# Patient Record
Sex: FEMALE | Race: WHITE | NOT HISPANIC OR LATINO | Employment: OTHER | URBAN - METROPOLITAN AREA
[De-identification: names, ages, dates, MRNs, and addresses within clinical notes are randomized per-mention and may not be internally consistent; named-entity substitution may affect disease eponyms.]

---

## 2022-02-14 NOTE — PROGRESS NOTES
Subjective:      Patient ID: Dalia Peña is a 76 y.o. female.    Chief Complaint: Establish Care      HPI  Patient is here to establish care, and for preventive exam.  Active daily, and [employer] working out.  Energy ok.  Had covid last month.  Occas allergies, claritin helps.  No f/c/sw/cough.  No cp/sob/palp.  BMs little slow, ok with stool softener.  Urine normal.  No depression/anxiety.  Sleeps ok.  Sometimes legs swelling at night.  Sometimes tingling toes.  Not eating much red meat.  Occas heartburn, takes prilosec prn.    Past Medical History:   Diagnosis Date    Essential (primary) hypertension      Past Surgical History:   Procedure Laterality Date    none       MEDS:    amLODIPine (NORVASC) 5 MG tablet 5 mg, Daily       NKDA    SH:  No tob, no EtOH, , Cares for 10y old grandson.    FH:  Mom leukemia 47yo.    HM:  10/21 fluvax, 5/21 covid vaccines, has had pneumovaccines, due for mmg, BMD at [facility], Cscope at 71yo rep 10y.    Review of Systems   Constitutional: Negative for activity change, appetite change, chills, diaphoresis, fever and unexpected weight change.   HENT: Positive for hearing loss. Negative for congestion, ear pain, rhinorrhea, sinus pressure, sore throat and trouble swallowing.    Eyes: Negative for discharge and visual disturbance.   Respiratory: Negative for cough, chest tightness, shortness of breath and wheezing.    Cardiovascular: Negative for chest pain and palpitations.   Gastrointestinal: Negative for blood in stool, constipation, diarrhea, nausea and vomiting.   Endocrine: Negative for polydipsia and polyuria.   Genitourinary: Negative for difficulty urinating, dysuria, frequency, hematuria, menstrual problem, urgency and vaginal discharge.   Musculoskeletal: Negative for arthralgias, joint swelling and neck pain.   Skin: Negative for rash.   Neurological: Negative for dizziness, weakness and headaches.   Psychiatric/Behavioral: Negative for confusion,  "dysphoric mood and sleep disturbance. The patient is not nervous/anxious.          Objective:   /72   Pulse 83   Temp 97.7 °F (36.5 °C) (Temporal)   Resp 18   Ht 5' 6" (1.676 m)   Wt 77 kg (169 lb 12.1 oz)   SpO2 99%   BMI 27.40 kg/m²     Physical Exam  Constitutional:       Appearance: She is well-developed.   HENT:      Right Ear: External ear normal. Tympanic membrane is not injected.      Left Ear: External ear normal. Tympanic membrane is not injected.   Eyes:      Conjunctiva/sclera: Conjunctivae normal.   Neck:      Thyroid: No thyromegaly.   Cardiovascular:      Rate and Rhythm: Normal rate and regular rhythm.      Heart sounds: No murmur heard.    No friction rub. No gallop.   Pulmonary:      Effort: Pulmonary effort is normal.      Breath sounds: Normal breath sounds. No wheezing or rales.   Chest:   Breasts:      Right: No mass, nipple discharge, skin change or tenderness.      Left: No mass, nipple discharge, skin change or tenderness.       Abdominal:      General: Bowel sounds are normal.      Palpations: Abdomen is soft. There is no mass.      Tenderness: There is no abdominal tenderness.   Musculoskeletal:      Cervical back: Normal range of motion and neck supple.   Lymphadenopathy:      Cervical: No cervical adenopathy.   Skin:     General: Skin is warm.      Findings: No rash.   Neurological:      Mental Status: She is alert and oriented to person, place, and time.             Assessment:       1. Essential (primary) hypertension    2. Encounter for preventive health examination    3. Encounter for screening mammogram for malignant neoplasm of breast    4. Paresthesia    5. Leg swelling          Plan:     Essential (primary) hypertension- Lab now with [place].  CXR and EKG.  -     CBC Auto Differential; Future; Expected date: 02/24/2022  -     Comprehensive Metabolic Panel; Future; Expected date: 02/24/2022  -     Lipid Panel; Future; Expected date: 02/24/2022  -     TSH; Future; Expected " date: 02/24/2022    Encounter for preventive health examination- Discussed pt needs to get Shingrix vaccination at pharmacy.    Encounter for screening mammogram for malignant neoplasm of breast  -     Mammo Digital Screening Bilat w/ Andrew; Future; Expected date: 02/24/2022    RTC 6mo.  Check BNP and B12.

## 2022-02-24 ENCOUNTER — HOSPITAL ENCOUNTER (OUTPATIENT)
Dept: RADIOLOGY | Facility: HOSPITAL | Age: 77
Discharge: HOME | End: 2022-02-24
Attending: INTERNAL MEDICINE
Payer: MEDICARE

## 2022-02-24 ENCOUNTER — OFFICE VISIT (OUTPATIENT)
Dept: FAMILY MEDICINE | Facility: CLINIC | Age: 77
End: 2022-02-24
Payer: MEDICARE

## 2022-02-24 VITALS
TEMPERATURE: 98 F | SYSTOLIC BLOOD PRESSURE: 134 MMHG | HEART RATE: 83 BPM | BODY MASS INDEX: 27.28 KG/M2 | RESPIRATION RATE: 18 BRPM | WEIGHT: 169.75 LBS | OXYGEN SATURATION: 99 % | HEIGHT: 66 IN | DIASTOLIC BLOOD PRESSURE: 72 MMHG

## 2022-02-24 DIAGNOSIS — Z00.00 ENCOUNTER FOR PREVENTIVE HEALTH EXAMINATION: ICD-10-CM

## 2022-02-24 DIAGNOSIS — I10 ESSENTIAL (PRIMARY) HYPERTENSION: ICD-10-CM

## 2022-02-24 DIAGNOSIS — Z12.31 ENCOUNTER FOR SCREENING MAMMOGRAM FOR MALIGNANT NEOPLASM OF BREAST: ICD-10-CM

## 2022-02-24 DIAGNOSIS — R20.2 PARESTHESIA: ICD-10-CM

## 2022-02-24 DIAGNOSIS — I10 ESSENTIAL (PRIMARY) HYPERTENSION: Primary | ICD-10-CM

## 2022-02-24 DIAGNOSIS — M79.89 LEG SWELLING: ICD-10-CM

## 2022-02-24 PROCEDURE — 99203 OFFICE O/P NEW LOW 30 MIN: CPT | Mod: S$GLB,,, | Performed by: INTERNAL MEDICINE

## 2022-02-24 PROCEDURE — 1126F AMNT PAIN NOTED NONE PRSNT: CPT | Mod: CPTII,S$GLB,, | Performed by: INTERNAL MEDICINE

## 2022-02-24 PROCEDURE — 71046 X-RAY EXAM CHEST 2 VIEWS: CPT | Mod: 26,,, | Performed by: RADIOLOGY

## 2022-02-24 PROCEDURE — 3288F FALL RISK ASSESSMENT DOCD: CPT | Mod: CPTII,S$GLB,, | Performed by: INTERNAL MEDICINE

## 2022-02-24 PROCEDURE — 1101F PR PT FALLS ASSESS DOC 0-1 FALLS W/OUT INJ PAST YR: ICD-10-PCS | Mod: CPTII,S$GLB,, | Performed by: INTERNAL MEDICINE

## 2022-02-24 PROCEDURE — 3078F DIAST BP <80 MM HG: CPT | Mod: CPTII,S$GLB,, | Performed by: INTERNAL MEDICINE

## 2022-02-24 PROCEDURE — 99999 PR PBB SHADOW E&M-NEW PATIENT-LVL III: CPT | Mod: PBBFAC,,, | Performed by: INTERNAL MEDICINE

## 2022-02-24 PROCEDURE — 3288F PR FALLS RISK ASSESSMENT DOCUMENTED: ICD-10-PCS | Mod: CPTII,S$GLB,, | Performed by: INTERNAL MEDICINE

## 2022-02-24 PROCEDURE — 3075F PR MOST RECENT SYSTOLIC BLOOD PRESS GE 130-139MM HG: ICD-10-PCS | Mod: CPTII,S$GLB,, | Performed by: INTERNAL MEDICINE

## 2022-02-24 PROCEDURE — 1126F PR PAIN SEVERITY QUANTIFIED, NO PAIN PRESENT: ICD-10-PCS | Mod: CPTII,S$GLB,, | Performed by: INTERNAL MEDICINE

## 2022-02-24 PROCEDURE — 99203 PR OFFICE/OUTPT VISIT, NEW, LEVL III, 30-44 MIN: ICD-10-PCS | Mod: S$GLB,,, | Performed by: INTERNAL MEDICINE

## 2022-02-24 PROCEDURE — 71046 X-RAY EXAM CHEST 2 VIEWS: CPT | Mod: TC,FY,PO

## 2022-02-24 PROCEDURE — 71046 XR CHEST PA AND LATERAL: ICD-10-PCS | Mod: 26,,, | Performed by: RADIOLOGY

## 2022-02-24 PROCEDURE — 3075F SYST BP GE 130 - 139MM HG: CPT | Mod: CPTII,S$GLB,, | Performed by: INTERNAL MEDICINE

## 2022-02-24 PROCEDURE — 1101F PT FALLS ASSESS-DOCD LE1/YR: CPT | Mod: CPTII,S$GLB,, | Performed by: INTERNAL MEDICINE

## 2022-02-24 PROCEDURE — 3078F PR MOST RECENT DIASTOLIC BLOOD PRESSURE < 80 MM HG: ICD-10-PCS | Mod: CPTII,S$GLB,, | Performed by: INTERNAL MEDICINE

## 2022-02-24 PROCEDURE — 99999 PR PBB SHADOW E&M-NEW PATIENT-LVL III: ICD-10-PCS | Mod: PBBFAC,,, | Performed by: INTERNAL MEDICINE

## 2022-02-24 RX ORDER — AMLODIPINE BESYLATE 5 MG/1
5 TABLET ORAL DAILY
COMMUNITY
End: 2022-03-14 | Stop reason: SDUPTHER

## 2022-03-01 ENCOUNTER — PATIENT MESSAGE (OUTPATIENT)
Dept: HOME HEALTH SERVICES | Facility: HOSPITAL | Age: 77
End: 2022-03-01
Payer: MEDICARE

## 2022-03-01 ENCOUNTER — PATIENT MESSAGE (OUTPATIENT)
Dept: FAMILY MEDICINE | Facility: CLINIC | Age: 77
End: 2022-03-01
Payer: MEDICARE

## 2022-03-11 ENCOUNTER — PATIENT MESSAGE (OUTPATIENT)
Dept: FAMILY MEDICINE | Facility: CLINIC | Age: 77
End: 2022-03-11
Payer: MEDICARE

## 2022-03-14 ENCOUNTER — PATIENT MESSAGE (OUTPATIENT)
Dept: FAMILY MEDICINE | Facility: CLINIC | Age: 77
End: 2022-03-14
Payer: MEDICARE

## 2022-03-14 RX ORDER — AMLODIPINE BESYLATE 5 MG/1
5 TABLET ORAL DAILY
Qty: 90 TABLET | Refills: 3 | Status: SHIPPED | OUTPATIENT
Start: 2022-03-14 | End: 2023-03-30

## 2022-03-22 ENCOUNTER — HOSPITAL ENCOUNTER (OUTPATIENT)
Dept: RADIOLOGY | Facility: HOSPITAL | Age: 77
Discharge: HOME | End: 2022-03-22
Attending: INTERNAL MEDICINE
Payer: MEDICARE

## 2022-03-22 VITALS — BODY MASS INDEX: 27.16 KG/M2 | HEIGHT: 66 IN | WEIGHT: 169 LBS

## 2022-03-22 DIAGNOSIS — Z12.31 ENCOUNTER FOR SCREENING MAMMOGRAM FOR MALIGNANT NEOPLASM OF BREAST: ICD-10-CM

## 2022-03-22 PROCEDURE — 77063 MAMMO DIGITAL SCREENING BILAT WITH TOMO: ICD-10-PCS | Mod: 26,,, | Performed by: RADIOLOGY

## 2022-03-22 PROCEDURE — 77067 MAMMO DIGITAL SCREENING BILAT WITH TOMO: ICD-10-PCS | Mod: 26,,, | Performed by: RADIOLOGY

## 2022-03-22 PROCEDURE — 77067 SCR MAMMO BI INCL CAD: CPT | Mod: 26,,, | Performed by: RADIOLOGY

## 2022-03-22 PROCEDURE — 77063 BREAST TOMOSYNTHESIS BI: CPT | Mod: TC

## 2022-03-22 PROCEDURE — 77063 BREAST TOMOSYNTHESIS BI: CPT | Mod: 26,,, | Performed by: RADIOLOGY

## 2022-04-04 ENCOUNTER — OFFICE VISIT (OUTPATIENT)
Dept: DERMATOLOGY | Facility: CLINIC | Age: 77
End: 2022-04-04
Payer: MEDICARE

## 2022-04-04 DIAGNOSIS — Z12.83 SKIN CANCER SCREENING: Primary | ICD-10-CM

## 2022-04-04 DIAGNOSIS — L81.4 SOLAR LENTIGO: ICD-10-CM

## 2022-04-04 DIAGNOSIS — L82.1 SEBORRHEIC KERATOSES: ICD-10-CM

## 2022-04-04 DIAGNOSIS — D18.00 HEMANGIOMA, UNSPECIFIED SITE: ICD-10-CM

## 2022-04-04 DIAGNOSIS — Z85.828 HISTORY OF NONMELANOMA SKIN CANCER: ICD-10-CM

## 2022-04-04 DIAGNOSIS — D22.9 MULTIPLE BENIGN NEVI: ICD-10-CM

## 2022-04-04 PROCEDURE — 1126F AMNT PAIN NOTED NONE PRSNT: CPT | Mod: CPTII,S$GLB,, | Performed by: STUDENT IN AN ORGANIZED HEALTH CARE EDUCATION/TRAINING PROGRAM

## 2022-04-04 PROCEDURE — 1126F PR PAIN SEVERITY QUANTIFIED, NO PAIN PRESENT: ICD-10-PCS | Mod: CPTII,S$GLB,, | Performed by: STUDENT IN AN ORGANIZED HEALTH CARE EDUCATION/TRAINING PROGRAM

## 2022-04-04 PROCEDURE — 1159F PR MEDICATION LIST DOCUMENTED IN MEDICAL RECORD: ICD-10-PCS | Mod: CPTII,S$GLB,, | Performed by: STUDENT IN AN ORGANIZED HEALTH CARE EDUCATION/TRAINING PROGRAM

## 2022-04-04 PROCEDURE — 3288F PR FALLS RISK ASSESSMENT DOCUMENTED: ICD-10-PCS | Mod: CPTII,S$GLB,, | Performed by: STUDENT IN AN ORGANIZED HEALTH CARE EDUCATION/TRAINING PROGRAM

## 2022-04-04 PROCEDURE — 1160F PR REVIEW ALL MEDS BY PRESCRIBER/CLIN PHARMACIST DOCUMENTED: ICD-10-PCS | Mod: CPTII,S$GLB,, | Performed by: STUDENT IN AN ORGANIZED HEALTH CARE EDUCATION/TRAINING PROGRAM

## 2022-04-04 PROCEDURE — 99203 OFFICE O/P NEW LOW 30 MIN: CPT | Mod: S$GLB,,, | Performed by: STUDENT IN AN ORGANIZED HEALTH CARE EDUCATION/TRAINING PROGRAM

## 2022-04-04 PROCEDURE — 99203 PR OFFICE/OUTPT VISIT, NEW, LEVL III, 30-44 MIN: ICD-10-PCS | Mod: S$GLB,,, | Performed by: STUDENT IN AN ORGANIZED HEALTH CARE EDUCATION/TRAINING PROGRAM

## 2022-04-04 PROCEDURE — 1160F RVW MEDS BY RX/DR IN RCRD: CPT | Mod: CPTII,S$GLB,, | Performed by: STUDENT IN AN ORGANIZED HEALTH CARE EDUCATION/TRAINING PROGRAM

## 2022-04-04 PROCEDURE — 99999 PR PBB SHADOW E&M-EST. PATIENT-LVL III: ICD-10-PCS | Mod: PBBFAC,,, | Performed by: STUDENT IN AN ORGANIZED HEALTH CARE EDUCATION/TRAINING PROGRAM

## 2022-04-04 PROCEDURE — 3288F FALL RISK ASSESSMENT DOCD: CPT | Mod: CPTII,S$GLB,, | Performed by: STUDENT IN AN ORGANIZED HEALTH CARE EDUCATION/TRAINING PROGRAM

## 2022-04-04 PROCEDURE — 99999 PR PBB SHADOW E&M-EST. PATIENT-LVL III: CPT | Mod: PBBFAC,,, | Performed by: STUDENT IN AN ORGANIZED HEALTH CARE EDUCATION/TRAINING PROGRAM

## 2022-04-04 PROCEDURE — 1159F MED LIST DOCD IN RCRD: CPT | Mod: CPTII,S$GLB,, | Performed by: STUDENT IN AN ORGANIZED HEALTH CARE EDUCATION/TRAINING PROGRAM

## 2022-04-04 PROCEDURE — 1101F PT FALLS ASSESS-DOCD LE1/YR: CPT | Mod: CPTII,S$GLB,, | Performed by: STUDENT IN AN ORGANIZED HEALTH CARE EDUCATION/TRAINING PROGRAM

## 2022-04-04 PROCEDURE — 1101F PR PT FALLS ASSESS DOC 0-1 FALLS W/OUT INJ PAST YR: ICD-10-PCS | Mod: CPTII,S$GLB,, | Performed by: STUDENT IN AN ORGANIZED HEALTH CARE EDUCATION/TRAINING PROGRAM

## 2022-04-04 NOTE — PROGRESS NOTES
Patient Information  Name: Dalia Peña  : 1945  MRN: [id number]     Referring Physician:  Dr. Meza   Primary Care Physician:  Dr. Giselle Warren MD   Date of Visit: 2022      Subjective:       Dalia Peña is a 76 y.o. female who presents for   Chief Complaint   Patient presents with    Skin Check     HPI  Patient here for skin check.     Does patient have a personal hx of skin cancers? Yes, BCC on back  Does patient have family hx of melanoma?  Yes, younger sister  Does patient have hx of strong sun exposure or tanning bed use in the past? Yes to sun exposure    Patient was last seen:Visit date not found     Prior notes by myself reviewed.   Clinical documentation obtained by nursing staff reviewed.    Review of Systems   Skin: Negative for itching and rash.        Objective:    Physical Exam   Constitutional: She appears well-developed and well-nourished. No distress.   Neurological: She is alert and oriented to person, place, and time. She is not disoriented.   Psychiatric: She has a normal mood and affect.   Skin:   Areas Examined (abnormalities noted in diagram):   Scalp / Hair Palpated and Inspected  Head / Face Inspection Performed  Neck Inspection Performed  Chest / Axilla Inspection Performed  Abdomen Inspection Performed  Genitals / Buttocks / Groin Inspection Performed  Back Inspection Performed  RUE Inspected  LUE Inspection Performed  RLE Inspected  LLE Inspection Performed  Nails and Digits Inspection Performed                   Diagram Legend     Erythematous scaling macule/papule c/w actinic keratosis       Vascular papule c/w angioma      Pigmented verrucoid papule/plaque c/w seborrheic keratosis      Yellow umbilicated papule c/w sebaceous hyperplasia      Irregularly shaped tan macule c/w lentigo     1-2 mm smooth white papules consistent with Milia      Movable subcutaneous cyst with punctum c/w epidermal inclusion cyst      Subcutaneous movable cyst c/w pilar cyst       Firm pink to brown papule c/w dermatofibroma      Pedunculated fleshy papule(s) c/w skin tag(s)      Evenly pigmented macule c/w junctional nevus     Mildly variegated pigmented, slightly irregular-bordered macule c/w mildly atypical nevus      Flesh colored to evenly pigmented papule c/w intradermal nevus       Pink pearly papule/plaque c/w basal cell carcinoma      Erythematous hyperkeratotic cursted plaque c/w SCC      Surgical scar with no sign of skin cancer recurrence      Open and closed comedones      Inflammatory papules and pustules      Verrucoid papule consistent consistent with wart     Erythematous eczematous patches and plaques     Dystrophic onycholytic nail with subungual debris c/w onychomycosis     Umbilicated papule    Erythematous-base heme-crusted tan verrucoid plaque consistent with inflamed seborrheic keratosis     Erythematous Silvery Scaling Plaque c/w Psoriasis     See annotation      No images are attached to the encounter or orders placed in the encounter.    [] Data reviewed  [] Independent review of test  [] Management discussed with another provider    Assessment / Plan:        Skin cancer screening/History of nonmelanoma skin cancer  Area of previous nonmelanoma examined. Site well healed with no signs of recurrence.    Total body skin examination performed today including at least 12 points as noted in physical examination. No lesions suspicious for malignancy noted.    Recommend daily sun protection/avoidance, use of at least SPF 30, broad spectrum sunscreen (OTC drug), skin self examinations, and routine physician surveillance to optimize early detection      Seborrheic keratoses  These are benign inherited growths without a malignant potential. Reassurance given to patient. No treatment is necessary.     Hemangioma, unspecified site  This is a benign vascular lesion. Reassurance given. No treatment required.     Solar lentigo  This is a benign hyperpigmented sun induced  lesion. Recommend daily sun protection/avoidance and use of at least SPF 30, broad spectrum sunscreen (OTC drug) will reduce the number of new lesions. Treatment of these benign lesions are considered cosmetic.    Multiple benign nevi  Discussed ABCDE's of nevi.  Monitor for new mole or moles that are becoming bigger, darker, irritated, or developing irregular borders. Brochure provided. Instructed patient to observe lesion(s) for changes and follow up in clinic if changes are noted. Patient to monitor skin at home for new or changing lesions.                  LOS NUMBER AND COMPLEXITY OF PROBLEMS    COMPLEXITY OF DATA RISK TOTAL TIME (m)   27415  63196 [] 1 self-limited or minor problem [x] Minimal to none [] No treatment recommended or patient to monitor 15-29  10-19   63996  35200 Low  [] 2 or > self limited or minor problems  [] 1 stable chronic illness  [] 1 acute, uncomplicated illness or injury Limited (2)  [] Prior external notes from each unique source  [] Review result of each unique test  [] Order each unique test [x]  Low  OTC medications, minor skin biopsy 30-44  20-29   78560  12087 Moderate  []  1 or > chronic illness with progression, exacerbation or SE of treatment  [x]  2 or more stable chronic illnesses  []  1 acute illness with systemic symptoms  []  1 acute complicated injury  []  1 undiagnosed new problem with uncertain prognosis Moderate (1/3 below)  []  3 or more data items        *Now includes assessment requiring independent historian  []  Independent interpretation of a test  []  Discuss management/test with another provider Moderate  []  Prescription drug mgmt  []  Minor surgery with risk discussed  []  Mgmt limited by social determinates 45-59  30-39   90978  87784 High  []  1 or more chronic illness with severe exacerbation, progression or SE of treatment  []  1 acute or chronic illness/injury that poses a threat to life or bodily function Extensive (2/3 below)  []  3 or more data  items        *Now includes assessment requiring independent historian.  []  Independent interpretation of a test  []  Discuss management/test with another provider High  []  Major surgery with risk discussed  []  Drug therapy requiring intensive monitoring for toxicity  []  Hospitalization  []  Decision for DNR 60-74  40-54      Follow up in about 1 year (around 4/4/2023).    Radha Dukes MD, FAAD  [facility]

## 2022-04-06 ENCOUNTER — PATIENT MESSAGE (OUTPATIENT)
Dept: FAMILY MEDICINE | Facility: CLINIC | Age: 77
End: 2022-04-06
Payer: MEDICARE

## 2022-05-10 ENCOUNTER — TELEPHONE (OUTPATIENT)
Dept: FAMILY MEDICINE | Facility: CLINIC | Age: 77
End: 2022-05-10
Payer: MEDICARE

## 2022-05-10 NOTE — TELEPHONE ENCOUNTER
S/w pt offered pt to go to UC she denied said it was to far and she did not want to go to any other UC. I offered appt on Monday she denied told pt we could look at other locations and she said not really because she has not seen them and knows nothing about them. Told pt if she could not go to urgent care or changed her mind I could get her in next available or always at a different location if available pt stated she would call back and thank you.

## 2022-05-10 NOTE — TELEPHONE ENCOUNTER
----- Message from Sylvie Bose sent at 5/10/2022  4:49 PM CDT -----  Contact: self  Dalia Peña would like a call back at [phone], in regards to getting an appointment scheduled on tomorrow for sinus and a cold. Pt states that she is comfortable with seeing a nurse.

## 2022-05-13 ENCOUNTER — OFFICE VISIT (OUTPATIENT)
Dept: URGENT CARE | Facility: CLINIC | Age: 77
End: 2022-05-13
Payer: MEDICARE

## 2022-05-13 VITALS
SYSTOLIC BLOOD PRESSURE: 139 MMHG | HEART RATE: 65 BPM | WEIGHT: 170 LBS | OXYGEN SATURATION: 97 % | RESPIRATION RATE: 18 BRPM | DIASTOLIC BLOOD PRESSURE: 69 MMHG | TEMPERATURE: 97 F | HEIGHT: 66 IN | BODY MASS INDEX: 27.32 KG/M2

## 2022-05-13 DIAGNOSIS — J30.2 SEASONAL ALLERGIC RHINITIS, UNSPECIFIED TRIGGER: Primary | ICD-10-CM

## 2022-05-13 PROCEDURE — 99203 PR OFFICE/OUTPT VISIT, NEW, LEVL III, 30-44 MIN: ICD-10-PCS | Mod: S$GLB,,, | Performed by: NURSE PRACTITIONER

## 2022-05-13 PROCEDURE — 1160F PR REVIEW ALL MEDS BY PRESCRIBER/CLIN PHARMACIST DOCUMENTED: ICD-10-PCS | Mod: CPTII,S$GLB,, | Performed by: NURSE PRACTITIONER

## 2022-05-13 PROCEDURE — 1159F MED LIST DOCD IN RCRD: CPT | Mod: CPTII,S$GLB,, | Performed by: NURSE PRACTITIONER

## 2022-05-13 PROCEDURE — 1160F RVW MEDS BY RX/DR IN RCRD: CPT | Mod: CPTII,S$GLB,, | Performed by: NURSE PRACTITIONER

## 2022-05-13 PROCEDURE — 1159F PR MEDICATION LIST DOCUMENTED IN MEDICAL RECORD: ICD-10-PCS | Mod: CPTII,S$GLB,, | Performed by: NURSE PRACTITIONER

## 2022-05-13 PROCEDURE — 3078F DIAST BP <80 MM HG: CPT | Mod: CPTII,S$GLB,, | Performed by: NURSE PRACTITIONER

## 2022-05-13 PROCEDURE — 99203 OFFICE O/P NEW LOW 30 MIN: CPT | Mod: S$GLB,,, | Performed by: NURSE PRACTITIONER

## 2022-05-13 PROCEDURE — 3075F PR MOST RECENT SYSTOLIC BLOOD PRESS GE 130-139MM HG: ICD-10-PCS | Mod: CPTII,S$GLB,, | Performed by: NURSE PRACTITIONER

## 2022-05-13 PROCEDURE — 1126F PR PAIN SEVERITY QUANTIFIED, NO PAIN PRESENT: ICD-10-PCS | Mod: CPTII,S$GLB,, | Performed by: NURSE PRACTITIONER

## 2022-05-13 PROCEDURE — 1126F AMNT PAIN NOTED NONE PRSNT: CPT | Mod: CPTII,S$GLB,, | Performed by: NURSE PRACTITIONER

## 2022-05-13 PROCEDURE — 3078F PR MOST RECENT DIASTOLIC BLOOD PRESSURE < 80 MM HG: ICD-10-PCS | Mod: CPTII,S$GLB,, | Performed by: NURSE PRACTITIONER

## 2022-05-13 PROCEDURE — 3075F SYST BP GE 130 - 139MM HG: CPT | Mod: CPTII,S$GLB,, | Performed by: NURSE PRACTITIONER

## 2022-05-13 RX ORDER — IPRATROPIUM BROMIDE 21 UG/1
2 SPRAY, METERED NASAL 2 TIMES DAILY
Qty: 30 ML | Refills: 0 | Status: SHIPPED | OUTPATIENT
Start: 2022-05-13 | End: 2022-05-20

## 2022-05-13 NOTE — PATIENT INSTRUCTIONS
You may continue Flonase twice daily. Please use the prescribed ipratropium bromide for 1 week prior to the Flonase.    You may switch from loratadine to fexofenadine (Allegra).

## 2022-05-16 ENCOUNTER — TELEPHONE (OUTPATIENT)
Dept: URGENT CARE | Facility: CLINIC | Age: 77
End: 2022-05-16
Payer: MEDICARE

## 2022-05-16 NOTE — TELEPHONE ENCOUNTER
Made courtesy call. Pt states that she is not feeling a whole lot better but will continue the regiment that was given by the provider for a week as instructed and if not felling better then she will come back in to the clinic.

## 2022-05-20 ENCOUNTER — OFFICE VISIT (OUTPATIENT)
Dept: FAMILY MEDICINE | Facility: CLINIC | Age: 77
End: 2022-05-20
Payer: MEDICARE

## 2022-05-20 VITALS
TEMPERATURE: 98 F | WEIGHT: 174.5 LBS | HEART RATE: 100 BPM | DIASTOLIC BLOOD PRESSURE: 78 MMHG | SYSTOLIC BLOOD PRESSURE: 118 MMHG | BODY MASS INDEX: 28.04 KG/M2 | OXYGEN SATURATION: 98 % | HEIGHT: 66 IN

## 2022-05-20 DIAGNOSIS — J32.9 BACTERIAL SINUSITIS: Primary | ICD-10-CM

## 2022-05-20 DIAGNOSIS — B96.89 BACTERIAL SINUSITIS: Primary | ICD-10-CM

## 2022-05-20 PROCEDURE — 3074F PR MOST RECENT SYSTOLIC BLOOD PRESSURE < 130 MM HG: ICD-10-PCS | Mod: CPTII,S$GLB,, | Performed by: FAMILY MEDICINE

## 2022-05-20 PROCEDURE — 3078F DIAST BP <80 MM HG: CPT | Mod: CPTII,S$GLB,, | Performed by: FAMILY MEDICINE

## 2022-05-20 PROCEDURE — 96372 THER/PROPH/DIAG INJ SC/IM: CPT | Mod: S$GLB,,, | Performed by: FAMILY MEDICINE

## 2022-05-20 PROCEDURE — 1159F MED LIST DOCD IN RCRD: CPT | Mod: CPTII,S$GLB,, | Performed by: FAMILY MEDICINE

## 2022-05-20 PROCEDURE — 1160F PR REVIEW ALL MEDS BY PRESCRIBER/CLIN PHARMACIST DOCUMENTED: ICD-10-PCS | Mod: CPTII,S$GLB,, | Performed by: FAMILY MEDICINE

## 2022-05-20 PROCEDURE — 1126F AMNT PAIN NOTED NONE PRSNT: CPT | Mod: CPTII,S$GLB,, | Performed by: FAMILY MEDICINE

## 2022-05-20 PROCEDURE — 99213 PR OFFICE/OUTPT VISIT, EST, LEVL III, 20-29 MIN: ICD-10-PCS | Mod: 25,S$GLB,, | Performed by: FAMILY MEDICINE

## 2022-05-20 PROCEDURE — 1159F PR MEDICATION LIST DOCUMENTED IN MEDICAL RECORD: ICD-10-PCS | Mod: CPTII,S$GLB,, | Performed by: FAMILY MEDICINE

## 2022-05-20 PROCEDURE — 3288F FALL RISK ASSESSMENT DOCD: CPT | Mod: CPTII,S$GLB,, | Performed by: FAMILY MEDICINE

## 2022-05-20 PROCEDURE — 1126F PR PAIN SEVERITY QUANTIFIED, NO PAIN PRESENT: ICD-10-PCS | Mod: CPTII,S$GLB,, | Performed by: FAMILY MEDICINE

## 2022-05-20 PROCEDURE — 3078F PR MOST RECENT DIASTOLIC BLOOD PRESSURE < 80 MM HG: ICD-10-PCS | Mod: CPTII,S$GLB,, | Performed by: FAMILY MEDICINE

## 2022-05-20 PROCEDURE — 1160F RVW MEDS BY RX/DR IN RCRD: CPT | Mod: CPTII,S$GLB,, | Performed by: FAMILY MEDICINE

## 2022-05-20 PROCEDURE — 99213 OFFICE O/P EST LOW 20 MIN: CPT | Mod: 25,S$GLB,, | Performed by: FAMILY MEDICINE

## 2022-05-20 PROCEDURE — 96372 PR INJECTION,THERAP/PROPH/DIAG2ST, IM OR SUBCUT: ICD-10-PCS | Mod: S$GLB,,, | Performed by: FAMILY MEDICINE

## 2022-05-20 PROCEDURE — 3288F PR FALLS RISK ASSESSMENT DOCUMENTED: ICD-10-PCS | Mod: CPTII,S$GLB,, | Performed by: FAMILY MEDICINE

## 2022-05-20 PROCEDURE — 1101F PT FALLS ASSESS-DOCD LE1/YR: CPT | Mod: CPTII,S$GLB,, | Performed by: FAMILY MEDICINE

## 2022-05-20 PROCEDURE — 99999 PR PBB SHADOW E&M-EST. PATIENT-LVL IV: CPT | Mod: PBBFAC,,, | Performed by: FAMILY MEDICINE

## 2022-05-20 PROCEDURE — 3074F SYST BP LT 130 MM HG: CPT | Mod: CPTII,S$GLB,, | Performed by: FAMILY MEDICINE

## 2022-05-20 PROCEDURE — 1101F PR PT FALLS ASSESS DOC 0-1 FALLS W/OUT INJ PAST YR: ICD-10-PCS | Mod: CPTII,S$GLB,, | Performed by: FAMILY MEDICINE

## 2022-05-20 PROCEDURE — 99999 PR PBB SHADOW E&M-EST. PATIENT-LVL IV: ICD-10-PCS | Mod: PBBFAC,,, | Performed by: FAMILY MEDICINE

## 2022-05-20 RX ORDER — AMOXICILLIN AND CLAVULANATE POTASSIUM 875; 125 MG/1; MG/1
1 TABLET, FILM COATED ORAL 2 TIMES DAILY
Qty: 20 TABLET | Refills: 0 | Status: SHIPPED | OUTPATIENT
Start: 2022-05-20 | End: 2022-05-30

## 2022-05-20 RX ORDER — BETAMETHASONE SODIUM PHOSPHATE AND BETAMETHASONE ACETATE 3; 3 MG/ML; MG/ML
9 INJECTION, SUSPENSION INTRA-ARTICULAR; INTRALESIONAL; INTRAMUSCULAR; SOFT TISSUE
Status: COMPLETED | OUTPATIENT
Start: 2022-05-20 | End: 2022-05-20

## 2022-05-20 RX ADMIN — BETAMETHASONE SODIUM PHOSPHATE AND BETAMETHASONE ACETATE 9 MG: 3; 3 INJECTION, SUSPENSION INTRA-ARTICULAR; INTRALESIONAL; INTRAMUSCULAR; SOFT TISSUE at 04:05

## 2022-05-20 NOTE — PROGRESS NOTES
CHIEF COMPLAINT:  This is a 76-year-old female complaining of upper respiratory symptoms.    SUBJECTIVE:  The patient complains of onset of seasonal allergies 4 weeks ago with sneezing more severe, nasal congestion and itchy watery eyes.  Approximately 1 week ago she began having sore throat, cough and worsening head congestion.  She complains of sore throat and right ear pain.  Patient was seen in urgent care and was told to use Flonase and ipratropium nasal sprays along with Allegra.  She has also taken loratadine and Mucinex.  She denies fever, chills, loss of sense of smell or taste, chest congestion, shortness of breath or wheezing.  She denies ill contacts.  Patient is vaccinated against COVID 19.     ROS:,  GENERAL: Patient denies fever, chills, night sweats.  Patient denies weight gain or loss. Patient denies anorexia, fatigue, weakness or swollen glands.  SKIN: Patient denies rash.  HEENT: Patient denies hearing loss.. Patient denies visual disturbance, eye irritation or discharge.  Positive for sore throat, ear pain, runny nose, postnasal drip and nasal congestion.  LUNGS: Patient denies wheeze or hemoptysis.  Positive for cough.  CARDIOVASCULAR: Patient denies chest pain, shortness of breath, palpitations, syncope or lower extremity edema.  GI: Patient denies abdominal pain, nausea, vomiting, diarrhea, constipation, blood in stool or melena.    OBJECTIVE:   GENERAL: Well-developed well-nourished elderly white female alert and oriented x3 in no acute distress.  Memory, judgment and cognition without deficit.  No audible wheezing.  SKIN: Clear without rash.  Normal color and tone.  HEENT: Eyes: Clear conjunctivae.  No scleral icterus.  Ears: Clear canals.  Clear TMs.  Nose: Without congestion.  Pharynx: Without injection or exudates.  NECK: Supple, normal range of motion.  No lymphadenopathy.    LUNGS: Clear to auscultation.  Normal respiratory effort.  CARDIOVASCULAR: Regular rhythm, normal S1, S2 without  murmur, gallop or rub.  EXTREMITIES:  Normal range of motion in all extremities.    NEUROLOGIC: Gait without abnormality.  No tremor.      ASSESSMENT:  1. Bacterial sinusitis      PLAN:   1. Symptomatic treatment.  2. Keep well hydrated.  3. Augmentin 875 mg twice daily for 10 days.  4. Celestone 9 mg IM.  5. Return to clinic if no improvement or worsening symptoms.      20 minutes of total time spent on the encounter, which includes face to face time and non-face to face time preparing to see the patient (eg, review of tests), Obtaining and/or reviewing separately obtained history, Documenting clinical information in the electronic or other health record, Independently interpreting results (not separately reported) and communicating results to the patient/family/caregiver, or Care coordination (not separately reported).     This note is generated with speech recognition software and is subject to transcription error and sound alike phrases that may be missed by proofreading.

## 2022-08-11 NOTE — PROGRESS NOTES
"Subjective:      Patient ID: Dalia Peña is a 77 y.o. female.    Chief Complaint: Follow-up      HPI  Here for follow up of medical problems.  Walking daily for exercise.  Energy  Good.  Plans to start Silver Sneakers.  No f/c/sw/cough.  Allergies good.  No cp/sob/palp.  BMs good with prunes/fiber.  Urine normal.    More heartburn lately, when lays down.  Thinks due to eating late at night.  Taking prilosec prn.    Updated/ annual due 2/23:  HM:  10/21 fluvax, 5/21 covid vaccines, has had pneumovaccines, 2022 Shingrix x2, 3/22 MMG, BMD at [facility], Cscope at 73yo rep 10y.     Review of Systems   Constitutional: Negative for chills, diaphoresis and fever.   Respiratory: Negative for cough and shortness of breath.    Cardiovascular: Negative for chest pain, palpitations and leg swelling.   Gastrointestinal: Negative for blood in stool, constipation, diarrhea, nausea and vomiting.   Genitourinary: Negative for dysuria, frequency and hematuria.   Psychiatric/Behavioral: The patient is not nervous/anxious.          Objective:   /76 (BP Location: Right arm, Patient Position: Sitting, BP Method: Medium (Manual))   Pulse 87   Temp 97.4 °F (36.3 °C)   Ht 5' 6" (1.676 m)   Wt 78.5 kg (173 lb 2.7 oz)   SpO2 96%   BMI 27.95 kg/m²     Physical Exam  Constitutional:       Appearance: She is well-developed.   Neck:      Thyroid: No thyroid mass.      Vascular: No carotid bruit.   Cardiovascular:      Rate and Rhythm: Normal rate and regular rhythm.      Heart sounds: No murmur heard.    No friction rub. No gallop.   Pulmonary:      Effort: Pulmonary effort is normal.      Breath sounds: Normal breath sounds. No wheezing or rales.   Abdominal:      General: Bowel sounds are normal.      Palpations: Abdomen is soft. There is no mass.      Tenderness: There is no abdominal tenderness.   Musculoskeletal:      Cervical back: Neck supple.   Lymphadenopathy:      Cervical: No cervical adenopathy.   Neurological:      " Mental Status: She is alert and oriented to person, place, and time.        Latest Reference Range & Units 02/24/22 09:00   WBC 3.90 - 12.70 K/uL 7.31   RBC 4.00 - 5.40 M/uL 4.32   Hemoglobin 12.0 - 16.0 g/dL 13.5   Hematocrit 37.0 - 48.5 % 43.5   MCV 82 - 98 fL 101 (H)   MCH 27.0 - 31.0 pg 31.3 (H)   MCHC 32.0 - 36.0 g/dL 31.0 (L)   RDW 11.5 - 14.5 % 12.5   Platelets 150 - 450 K/uL 261   MPV 9.2 - 12.9 fL 9.6   Gran % 38.0 - 73.0 % 54.9   Lymph % 18.0 - 48.0 % 32.3   Mono % 4.0 - 15.0 % 7.8   Eosinophil % 0.0 - 8.0 % 3.8   Basophil % 0.0 - 1.9 % 0.8   Immature Granulocytes 0.0 - 0.5 % 0.4   Gran # (ANC) 1.8 - 7.7 K/uL 4.0   Lymph # 1.0 - 4.8 K/uL 2.4   Mono # 0.3 - 1.0 K/uL 0.6   Eos # 0.0 - 0.5 K/uL 0.3   Baso # 0.00 - 0.20 K/uL 0.06   Immature Grans (Abs) 0.00 - 0.04 K/uL 0.03   nRBC 0 /100 WBC 0   Differential Method  Automated   Vitamin B-12 210 - 950 pg/mL 345   Sodium 136 - 145 mmol/L 139   Potassium 3.5 - 5.1 mmol/L 4.0   Chloride 95 - 110 mmol/L 103   CO2 23 - 29 mmol/L 27   Anion Gap 8 - 16 mmol/L 9   BUN 8 - 23 mg/dL 17   Creatinine 0.5 - 1.4 mg/dL 0.8   eGFR if non African American >60 mL/min/1.73 m^2 >60.0   eGFR if African American >60 mL/min/1.73 m^2 >60.0   Glucose 70 - 110 mg/dL 88   Calcium 8.7 - 10.5 mg/dL 10.1   Alkaline Phosphatase 55 - 135 U/L 88   PROTEIN TOTAL 6.0 - 8.4 g/dL 7.6   Albumin 3.5 - 5.2 g/dL 3.8   BILIRUBIN TOTAL 0.1 - 1.0 mg/dL 0.5   AST 10 - 40 U/L 23   ALT 10 - 44 U/L 18   Cholesterol 120 - 199 mg/dL 215 (H)   HDL 40 - 75 mg/dL 52   HDL/Cholesterol Ratio 20.0 - 50.0 % 24.2   LDL Cholesterol External 63.0 - 159.0 mg/dL 138.2   Non-HDL Cholesterol mg/dL 163   Total Cholesterol/HDL Ratio 2.0 - 5.0  4.1   Triglycerides 30 - 150 mg/dL 124   BNP 0 - 99 pg/mL 21   TSH 0.400 - 4.000 uIU/mL 0.770           Assessment:       1. Essential hypertension    2. Asymptomatic postmenopausal state    3. Other hyperlipidemia    4. Gastroesophageal reflux disease without esophagitis    5.  Constipation, unspecified constipation type          Plan:     Essential hypertension- stable, cont rx.    Asymptomatic postmenopausal state  -     DXA Bone Density Spine And Hip; Future; Expected date: 08/24/2022    Other hyperlipidemia- cont exercise.    Gastroesophageal reflux disease without esophagitis- avoid red sauce and late night.  RTC 3 mo.    Constipation, unspecified constipation type- more aggressive fiber/fluid.  -     X-Ray Abdomen AP 1 View; Future; Expected date: 08/24/2022

## 2022-08-12 ENCOUNTER — TELEPHONE (OUTPATIENT)
Dept: ADMINISTRATIVE | Facility: HOSPITAL | Age: 77
End: 2022-08-12
Payer: MEDICARE

## 2022-08-24 ENCOUNTER — PATIENT MESSAGE (OUTPATIENT)
Dept: FAMILY MEDICINE | Facility: CLINIC | Age: 77
End: 2022-08-24

## 2022-08-24 ENCOUNTER — PATIENT MESSAGE (OUTPATIENT)
Dept: INTERNAL MEDICINE | Facility: CLINIC | Age: 77
End: 2022-08-24
Payer: MEDICARE

## 2022-08-24 ENCOUNTER — HOSPITAL ENCOUNTER (OUTPATIENT)
Dept: RADIOLOGY | Facility: HOSPITAL | Age: 77
Discharge: HOME | End: 2022-08-24
Attending: INTERNAL MEDICINE
Payer: MEDICARE

## 2022-08-24 ENCOUNTER — OFFICE VISIT (OUTPATIENT)
Dept: FAMILY MEDICINE | Facility: CLINIC | Age: 77
End: 2022-08-24
Payer: MEDICARE

## 2022-08-24 VITALS
WEIGHT: 173.19 LBS | HEART RATE: 87 BPM | BODY MASS INDEX: 27.83 KG/M2 | OXYGEN SATURATION: 96 % | HEIGHT: 66 IN | DIASTOLIC BLOOD PRESSURE: 76 MMHG | TEMPERATURE: 97 F | SYSTOLIC BLOOD PRESSURE: 130 MMHG

## 2022-08-24 DIAGNOSIS — K59.00 CONSTIPATION, UNSPECIFIED CONSTIPATION TYPE: ICD-10-CM

## 2022-08-24 DIAGNOSIS — Z78.0 ASYMPTOMATIC POSTMENOPAUSAL STATE: ICD-10-CM

## 2022-08-24 DIAGNOSIS — E78.49 OTHER HYPERLIPIDEMIA: ICD-10-CM

## 2022-08-24 DIAGNOSIS — K21.9 GASTROESOPHAGEAL REFLUX DISEASE WITHOUT ESOPHAGITIS: ICD-10-CM

## 2022-08-24 DIAGNOSIS — I10 ESSENTIAL HYPERTENSION: Primary | ICD-10-CM

## 2022-08-24 PROCEDURE — 99999 PR PBB SHADOW E&M-EST. PATIENT-LVL III: ICD-10-PCS | Mod: PBBFAC,,, | Performed by: INTERNAL MEDICINE

## 2022-08-24 PROCEDURE — 3288F FALL RISK ASSESSMENT DOCD: CPT | Mod: CPTII,S$GLB,, | Performed by: INTERNAL MEDICINE

## 2022-08-24 PROCEDURE — 74018 RADEX ABDOMEN 1 VIEW: CPT | Mod: 26,,, | Performed by: RADIOLOGY

## 2022-08-24 PROCEDURE — 99999 PR PBB SHADOW E&M-EST. PATIENT-LVL III: CPT | Mod: PBBFAC,,, | Performed by: INTERNAL MEDICINE

## 2022-08-24 PROCEDURE — 99214 OFFICE O/P EST MOD 30 MIN: CPT | Mod: S$GLB,,, | Performed by: INTERNAL MEDICINE

## 2022-08-24 PROCEDURE — 1101F PT FALLS ASSESS-DOCD LE1/YR: CPT | Mod: CPTII,S$GLB,, | Performed by: INTERNAL MEDICINE

## 2022-08-24 PROCEDURE — 3075F SYST BP GE 130 - 139MM HG: CPT | Mod: CPTII,S$GLB,, | Performed by: INTERNAL MEDICINE

## 2022-08-24 PROCEDURE — 99214 PR OFFICE/OUTPT VISIT, EST, LEVL IV, 30-39 MIN: ICD-10-PCS | Mod: S$GLB,,, | Performed by: INTERNAL MEDICINE

## 2022-08-24 PROCEDURE — 1101F PR PT FALLS ASSESS DOC 0-1 FALLS W/OUT INJ PAST YR: ICD-10-PCS | Mod: CPTII,S$GLB,, | Performed by: INTERNAL MEDICINE

## 2022-08-24 PROCEDURE — 1159F MED LIST DOCD IN RCRD: CPT | Mod: CPTII,S$GLB,, | Performed by: INTERNAL MEDICINE

## 2022-08-24 PROCEDURE — 3075F PR MOST RECENT SYSTOLIC BLOOD PRESS GE 130-139MM HG: ICD-10-PCS | Mod: CPTII,S$GLB,, | Performed by: INTERNAL MEDICINE

## 2022-08-24 PROCEDURE — 74018 XR ABDOMEN AP 1 VIEW: ICD-10-PCS | Mod: 26,,, | Performed by: RADIOLOGY

## 2022-08-24 PROCEDURE — 1159F PR MEDICATION LIST DOCUMENTED IN MEDICAL RECORD: ICD-10-PCS | Mod: CPTII,S$GLB,, | Performed by: INTERNAL MEDICINE

## 2022-08-24 PROCEDURE — 3288F PR FALLS RISK ASSESSMENT DOCUMENTED: ICD-10-PCS | Mod: CPTII,S$GLB,, | Performed by: INTERNAL MEDICINE

## 2022-08-24 PROCEDURE — 74018 RADEX ABDOMEN 1 VIEW: CPT | Mod: TC,FY,PO

## 2022-08-24 PROCEDURE — 3078F PR MOST RECENT DIASTOLIC BLOOD PRESSURE < 80 MM HG: ICD-10-PCS | Mod: CPTII,S$GLB,, | Performed by: INTERNAL MEDICINE

## 2022-08-24 PROCEDURE — 3078F DIAST BP <80 MM HG: CPT | Mod: CPTII,S$GLB,, | Performed by: INTERNAL MEDICINE

## 2022-09-16 ENCOUNTER — PATIENT MESSAGE (OUTPATIENT)
Dept: FAMILY MEDICINE | Facility: CLINIC | Age: 77
End: 2022-09-16
Payer: MEDICARE

## 2022-09-16 RX ORDER — CIPROFLOXACIN 250 MG/1
250 TABLET, FILM COATED ORAL 2 TIMES DAILY
Qty: 14 TABLET | Refills: 0 | Status: SHIPPED | OUTPATIENT
Start: 2022-09-16 | End: 2022-09-23

## 2022-09-23 ENCOUNTER — APPOINTMENT (OUTPATIENT)
Dept: RADIOLOGY | Facility: HOSPITAL | Age: 77
End: 2022-09-23
Attending: INTERNAL MEDICINE
Payer: MEDICARE

## 2022-09-23 DIAGNOSIS — Z78.0 ASYMPTOMATIC POSTMENOPAUSAL STATE: ICD-10-CM

## 2022-09-23 PROCEDURE — 77080 DEXA BONE DENSITY SPINE HIP: ICD-10-PCS | Mod: 26,,, | Performed by: RADIOLOGY

## 2022-09-23 PROCEDURE — 77080 DXA BONE DENSITY AXIAL: CPT | Mod: TC

## 2022-09-23 PROCEDURE — 77080 DXA BONE DENSITY AXIAL: CPT | Mod: 26,,, | Performed by: RADIOLOGY

## 2022-10-14 ENCOUNTER — PATIENT MESSAGE (OUTPATIENT)
Dept: PRIMARY CARE CLINIC | Facility: CLINIC | Age: 77
End: 2022-10-14
Payer: MEDICARE

## 2023-02-24 NOTE — PROGRESS NOTES
Subjective:      Patient ID: Dalia Peña is a 77 y.o. female.    Chief Complaint: Follow-up (Pt is here for three month follow up. Pt is still having symptoms of indigestion while taking Prolisec. /)      HPI  Here for f/u medical problems and preventive exam.  Walks 13-17 miles a week.  Energy good.  No f/c/sw/cough.  No exertional cp/sob/palp.  BMs moving better, stool softener and fiber.  Urine normal.  Needs PPI, or has reflux sx.  Lots stress, caring for sick sister/ sick / 5 year old cares for/ considering moving to [place] to be near his guardian.  Feels anxious, jane at bedtime.       Advance Care Planning     Date: 03/09/2023    Power of   I initiated the process of advance care planning today and explained the importance of this process to the patient.  I introduced the concept of advance directives to the patient, as well. Then the patient received detailed information about the importance of designating a Health Care Power of  (HCPOA). She was also instructed to communicate with this person about their wishes for future healthcare, should she become sick and lose decision-making capacity. The patient has previously appointed a HCPOA. After our discussion, the patient has decided to complete a HCPOA and has appointed her significant other, health care agent:  Satnam Peña  & health care agent number:  [phone].          The 10-year ASCVD risk score (Coby BAKER, et al., 2019) is: 29.1%    Values used to calculate the score:      Age: 77 years      Sex: Female      Is Non- : No      Diabetic: No      Tobacco smoker: No      Systolic Blood Pressure: 138 mmHg      Is BP treated: Yes      HDL Cholesterol: 52 mg/dL      Total Cholesterol: 215 mg/dL        9/22 BMD:  FINDINGS:  The L1 to L4 vertebral bone mineral density is equal to 1.202 g/cm squared with a T score of 0.1.     The left femoral neck bone mineral density is equal to 0.806 g/cm squared with a  "T score of -1.7.     There is a 12.8% risk of a major osteoporotic fracture and a 3% risk of hip fracture in the next 10 years (FRAX).     Impression:     Osteopenia       HM:  11/22 fluvax, 5/21 covid vaccines, has had pneumovaccines, 2022 Shingrix x2, 3/22 MMG/me, 9/22 BMD rep 2y, Cscope at 73yo rep 10y.     Review of Systems   Constitutional:  Negative for appetite change, chills, diaphoresis and fever.   HENT:  Negative for congestion, ear pain, rhinorrhea, sinus pressure and sore throat.    Respiratory:  Negative for cough, chest tightness and shortness of breath.    Cardiovascular:  Negative for chest pain and palpitations.   Gastrointestinal:  Negative for blood in stool, constipation, diarrhea, nausea and vomiting.   Genitourinary:  Negative for dysuria, frequency, hematuria, menstrual problem, urgency and vaginal discharge.   Musculoskeletal:  Negative for arthralgias.   Skin:  Negative for rash.   Neurological:  Negative for dizziness and headaches.   Psychiatric/Behavioral:  Negative for sleep disturbance. The patient is not nervous/anxious.        Objective:   /74   Pulse 68   Temp 97.8 °F (36.6 °C) (Oral)   Ht 5' 6" (1.676 m)   Wt 79.7 kg (175 lb 11.2 oz)   SpO2 100%   BMI 28.36 kg/m²     Physical Exam  Constitutional:       Appearance: She is well-developed.   HENT:      Right Ear: External ear normal. Tympanic membrane is not injected.      Left Ear: External ear normal. Tympanic membrane is not injected.   Eyes:      Conjunctiva/sclera: Conjunctivae normal.   Neck:      Thyroid: No thyromegaly.   Cardiovascular:      Rate and Rhythm: Normal rate and regular rhythm.      Heart sounds: No murmur heard.    No friction rub. No gallop.   Pulmonary:      Effort: Pulmonary effort is normal.      Breath sounds: Normal breath sounds. No wheezing or rales.   Chest:   Breasts:     Right: No mass, nipple discharge, skin change or tenderness.      Left: No mass, nipple discharge, skin change or " tenderness.   Abdominal:      General: Bowel sounds are normal.      Palpations: Abdomen is soft. There is no mass.      Tenderness: There is no abdominal tenderness.   Musculoskeletal:      Cervical back: Normal range of motion and neck supple.   Lymphadenopathy:      Cervical: No cervical adenopathy.   Skin:     General: Skin is warm.      Findings: No rash.   Neurological:      Mental Status: She is alert and oriented to person, place, and time.         Assessment:       1. Essential hypertension    2. Other hyperlipidemia    3. Encounter for preventive health examination    4. Osteopenia with high risk of fracture    5. Situational anxiety          Plan:     Essential hypertension- monitor BPs for f/u 1mo.    Other hyperlipidemia- recheck now, likely needs   -     CBC Auto Differential; Future; Expected date: 03/09/2023  -     Comprehensive Metabolic Panel; Future; Expected date: 03/09/2023  -     Lipid Panel; Future; Expected date: 03/09/2023  -     TSH; Future; Expected date: 03/09/2023    Encounter for preventive health examination- get Tet at pharmacy.    Osteopenia with high risk of fracture  -     Ambulatory referral/consult to Physical/Occupational Therapy; Future; Expected date: 03/16/2023    Situational anxiety- may need SSRI, try buspar for now.  RTC 1mo.  -     busPIRone (BUSPAR) 5 MG Tab; Take 1 tablet (5 mg total) by mouth 2 (two) times daily as needed (anxiety).  Dispense: 60 tablet; Refill: 3

## 2023-03-09 ENCOUNTER — OFFICE VISIT (OUTPATIENT)
Dept: PRIMARY CARE CLINIC | Facility: CLINIC | Age: 78
End: 2023-03-09
Payer: MEDICARE

## 2023-03-09 VITALS
SYSTOLIC BLOOD PRESSURE: 138 MMHG | DIASTOLIC BLOOD PRESSURE: 74 MMHG | WEIGHT: 175.69 LBS | TEMPERATURE: 98 F | BODY MASS INDEX: 28.23 KG/M2 | OXYGEN SATURATION: 100 % | HEIGHT: 66 IN | HEART RATE: 68 BPM

## 2023-03-09 DIAGNOSIS — M85.80 OSTEOPENIA WITH HIGH RISK OF FRACTURE: ICD-10-CM

## 2023-03-09 DIAGNOSIS — E78.49 OTHER HYPERLIPIDEMIA: ICD-10-CM

## 2023-03-09 DIAGNOSIS — Z00.00 ENCOUNTER FOR PREVENTIVE HEALTH EXAMINATION: ICD-10-CM

## 2023-03-09 DIAGNOSIS — I10 ESSENTIAL HYPERTENSION: Primary | ICD-10-CM

## 2023-03-09 DIAGNOSIS — F41.8 SITUATIONAL ANXIETY: ICD-10-CM

## 2023-03-09 LAB
ALBUMIN SERPL BCP-MCNC: 3.9 G/DL (ref 3.5–5.2)
ALP SERPL-CCNC: 86 U/L (ref 55–135)
ALT SERPL W/O P-5'-P-CCNC: 18 U/L (ref 10–44)
ANION GAP SERPL CALC-SCNC: 6 MMOL/L (ref 8–16)
AST SERPL-CCNC: 19 U/L (ref 10–40)
BASOPHILS # BLD AUTO: 0.05 K/UL (ref 0–0.2)
BASOPHILS NFR BLD: 0.7 % (ref 0–1.9)
BILIRUB SERPL-MCNC: 0.3 MG/DL (ref 0.1–1)
BUN SERPL-MCNC: 22 MG/DL (ref 8–23)
CALCIUM SERPL-MCNC: 10 MG/DL (ref 8.7–10.5)
CHLORIDE SERPL-SCNC: 105 MMOL/L (ref 95–110)
CHOLEST SERPL-MCNC: 207 MG/DL (ref 120–199)
CHOLEST/HDLC SERPL: 4.4 {RATIO} (ref 2–5)
CO2 SERPL-SCNC: 27 MMOL/L (ref 23–29)
CREAT SERPL-MCNC: 0.8 MG/DL (ref 0.5–1.4)
DIFFERENTIAL METHOD: ABNORMAL
EOSINOPHIL # BLD AUTO: 0.3 K/UL (ref 0–0.5)
EOSINOPHIL NFR BLD: 4.9 % (ref 0–8)
ERYTHROCYTE [DISTWIDTH] IN BLOOD BY AUTOMATED COUNT: 12.4 % (ref 11.5–14.5)
EST. GFR  (NO RACE VARIABLE): >60 ML/MIN/1.73 M^2
GLUCOSE SERPL-MCNC: 103 MG/DL (ref 70–110)
HCT VFR BLD AUTO: 40.6 % (ref 37–48.5)
HDLC SERPL-MCNC: 47 MG/DL (ref 40–75)
HDLC SERPL: 22.7 % (ref 20–50)
HGB BLD-MCNC: 12.9 G/DL (ref 12–16)
IMM GRANULOCYTES # BLD AUTO: 0.01 K/UL (ref 0–0.04)
IMM GRANULOCYTES NFR BLD AUTO: 0.1 % (ref 0–0.5)
LDLC SERPL CALC-MCNC: 123.4 MG/DL (ref 63–159)
LYMPHOCYTES # BLD AUTO: 3 K/UL (ref 1–4.8)
LYMPHOCYTES NFR BLD: 43.9 % (ref 18–48)
MCH RBC QN AUTO: 30.9 PG (ref 27–31)
MCHC RBC AUTO-ENTMCNC: 31.8 G/DL (ref 32–36)
MCV RBC AUTO: 97 FL (ref 82–98)
MONOCYTES # BLD AUTO: 0.6 K/UL (ref 0.3–1)
MONOCYTES NFR BLD: 8.3 % (ref 4–15)
NEUTROPHILS # BLD AUTO: 2.9 K/UL (ref 1.8–7.7)
NEUTROPHILS NFR BLD: 42.1 % (ref 38–73)
NONHDLC SERPL-MCNC: 160 MG/DL
NRBC BLD-RTO: 0 /100 WBC
PLATELET # BLD AUTO: 244 K/UL (ref 150–450)
PMV BLD AUTO: 9.8 FL (ref 9.2–12.9)
POTASSIUM SERPL-SCNC: 4.4 MMOL/L (ref 3.5–5.1)
PROT SERPL-MCNC: 7.3 G/DL (ref 6–8.4)
RBC # BLD AUTO: 4.17 M/UL (ref 4–5.4)
SODIUM SERPL-SCNC: 138 MMOL/L (ref 136–145)
TRIGL SERPL-MCNC: 183 MG/DL (ref 30–150)
TSH SERPL DL<=0.005 MIU/L-ACNC: 0.88 UIU/ML (ref 0.4–4)
WBC # BLD AUTO: 6.9 K/UL (ref 3.9–12.7)

## 2023-03-09 PROCEDURE — 80061 LIPID PANEL: CPT | Performed by: INTERNAL MEDICINE

## 2023-03-09 PROCEDURE — 99999 PR PBB SHADOW E&M-EST. PATIENT-LVL IV: ICD-10-PCS | Mod: PBBFAC,,, | Performed by: INTERNAL MEDICINE

## 2023-03-09 PROCEDURE — 1159F PR MEDICATION LIST DOCUMENTED IN MEDICAL RECORD: ICD-10-PCS | Mod: CPTII,S$GLB,, | Performed by: INTERNAL MEDICINE

## 2023-03-09 PROCEDURE — 3078F DIAST BP <80 MM HG: CPT | Mod: CPTII,S$GLB,, | Performed by: INTERNAL MEDICINE

## 2023-03-09 PROCEDURE — 3078F PR MOST RECENT DIASTOLIC BLOOD PRESSURE < 80 MM HG: ICD-10-PCS | Mod: CPTII,S$GLB,, | Performed by: INTERNAL MEDICINE

## 2023-03-09 PROCEDURE — 1158F PR ADVANCE CARE PLANNING DISCUSS DOCUMENTED IN MEDICAL RECORD: ICD-10-PCS | Mod: CPTII,S$GLB,, | Performed by: INTERNAL MEDICINE

## 2023-03-09 PROCEDURE — 1125F AMNT PAIN NOTED PAIN PRSNT: CPT | Mod: CPTII,S$GLB,, | Performed by: INTERNAL MEDICINE

## 2023-03-09 PROCEDURE — 99214 PR OFFICE/OUTPT VISIT, EST, LEVL IV, 30-39 MIN: ICD-10-PCS | Mod: S$GLB,,, | Performed by: INTERNAL MEDICINE

## 2023-03-09 PROCEDURE — 3288F PR FALLS RISK ASSESSMENT DOCUMENTED: ICD-10-PCS | Mod: CPTII,S$GLB,, | Performed by: INTERNAL MEDICINE

## 2023-03-09 PROCEDURE — 1158F ADVNC CARE PLAN TLK DOCD: CPT | Mod: CPTII,S$GLB,, | Performed by: INTERNAL MEDICINE

## 2023-03-09 PROCEDURE — 99214 OFFICE O/P EST MOD 30 MIN: CPT | Mod: S$GLB,,, | Performed by: INTERNAL MEDICINE

## 2023-03-09 PROCEDURE — 3288F FALL RISK ASSESSMENT DOCD: CPT | Mod: CPTII,S$GLB,, | Performed by: INTERNAL MEDICINE

## 2023-03-09 PROCEDURE — 99999 PR PBB SHADOW E&M-EST. PATIENT-LVL IV: CPT | Mod: PBBFAC,,, | Performed by: INTERNAL MEDICINE

## 2023-03-09 PROCEDURE — 84443 ASSAY THYROID STIM HORMONE: CPT | Performed by: INTERNAL MEDICINE

## 2023-03-09 PROCEDURE — 3075F PR MOST RECENT SYSTOLIC BLOOD PRESS GE 130-139MM HG: ICD-10-PCS | Mod: CPTII,S$GLB,, | Performed by: INTERNAL MEDICINE

## 2023-03-09 PROCEDURE — 1159F MED LIST DOCD IN RCRD: CPT | Mod: CPTII,S$GLB,, | Performed by: INTERNAL MEDICINE

## 2023-03-09 PROCEDURE — 80053 COMPREHEN METABOLIC PANEL: CPT | Performed by: INTERNAL MEDICINE

## 2023-03-09 PROCEDURE — 1101F PT FALLS ASSESS-DOCD LE1/YR: CPT | Mod: CPTII,S$GLB,, | Performed by: INTERNAL MEDICINE

## 2023-03-09 PROCEDURE — 3075F SYST BP GE 130 - 139MM HG: CPT | Mod: CPTII,S$GLB,, | Performed by: INTERNAL MEDICINE

## 2023-03-09 PROCEDURE — 1125F PR PAIN SEVERITY QUANTIFIED, PAIN PRESENT: ICD-10-PCS | Mod: CPTII,S$GLB,, | Performed by: INTERNAL MEDICINE

## 2023-03-09 PROCEDURE — 1101F PR PT FALLS ASSESS DOC 0-1 FALLS W/OUT INJ PAST YR: ICD-10-PCS | Mod: CPTII,S$GLB,, | Performed by: INTERNAL MEDICINE

## 2023-03-09 PROCEDURE — 85025 COMPLETE CBC W/AUTO DIFF WBC: CPT | Performed by: INTERNAL MEDICINE

## 2023-03-09 RX ORDER — BUSPIRONE HYDROCHLORIDE 5 MG/1
5 TABLET ORAL 2 TIMES DAILY PRN
Qty: 60 TABLET | Refills: 3 | Status: SHIPPED | OUTPATIENT
Start: 2023-03-09 | End: 2023-10-16

## 2023-03-10 ENCOUNTER — PATIENT MESSAGE (OUTPATIENT)
Dept: PRIMARY CARE CLINIC | Facility: CLINIC | Age: 78
End: 2023-03-10
Payer: MEDICARE

## 2023-04-21 NOTE — PROGRESS NOTES
"Subjective:      Patient ID: Dalia Peña is a 77 y.o. female.    Chief Complaint: Follow-up (One month follow up. Pt is here to follow up on blood pressure. Pt has blood pressure cuff with her. Pt is concerned about low heart rate in the mornings.)      HPI  Here for follow up of medical problems.  BP at home mostly 120s to 133/60s.  Active daily, 15 miles walking in each week.  Buspar works well for anxiety, and sleeping better.  No cp/sob/palp.  BMs slow, taking stool softener.      The 10-year ASCVD risk score (Coby BAKER, et al., 2019) is: 26.7%    Values used to calculate the score:      Age: 77 years      Sex: Female      Is Non- : No      Diabetic: No      Tobacco smoker: No      Systolic Blood Pressure: 132 mmHg      Is BP treated: Yes      HDL Cholesterol: 47 mg/dL      Total Cholesterol: 207 mg/dL      Updated/ annual due 3/24:  HM:  11/22 fluvax, 5/21 covid vaccines, 2/17 [id number], 11/18 [id number], 3/23 TDaP, 2022 Shingrix x2, 3/22 MMG/me, 9/22 BMD rep 2y, Cscope at 71yo rep 10y.     Review of Systems   Constitutional:  Negative for chills, diaphoresis and fever.   Respiratory:  Negative for cough and shortness of breath.    Cardiovascular:  Negative for chest pain, palpitations and leg swelling.   Gastrointestinal:  Negative for blood in stool, constipation, diarrhea, nausea and vomiting.   Genitourinary:  Negative for dysuria, frequency and hematuria.   Psychiatric/Behavioral:  The patient is not nervous/anxious.        Objective:   /68 (BP Location: Right arm)   Pulse 67   Temp 98 °F (36.7 °C) (Oral)   Ht 5' 6" (1.676 m)   Wt 79.7 kg (175 lb 9.6 oz)   SpO2 98%   BMI 28.34 kg/m²     Physical Exam  Constitutional:       Appearance: She is well-developed.   Neck:      Thyroid: No thyroid mass.      Vascular: No carotid bruit.   Cardiovascular:      Rate and Rhythm: Normal rate and regular rhythm.      Heart sounds: No murmur heard.    No friction rub. No gallop. "   Pulmonary:      Effort: Pulmonary effort is normal.      Breath sounds: Normal breath sounds. No wheezing or rales.   Abdominal:      General: Bowel sounds are normal.      Palpations: Abdomen is soft. There is no mass.      Tenderness: There is no abdominal tenderness.   Musculoskeletal:      Cervical back: Neck supple.   Lymphadenopathy:      Cervical: No cervical adenopathy.   Neurological:      Mental Status: She is alert and oriented to person, place, and time.        Latest Reference Range & Units 03/09/23 16:16   Cholesterol 120 - 199 mg/dL 207 (H)   HDL 40 - 75 mg/dL 47   HDL/Cholesterol Ratio 20.0 - 50.0 % 22.7   LDL Cholesterol External 63.0 - 159.0 mg/dL 123.4   Non-HDL Cholesterol mg/dL 160   Total Cholesterol/HDL Ratio 2.0 - 5.0  4.4   Triglycerides 30 - 150 mg/dL 183 (H)     Assessment:       1. Essential hypertension    2. Other hyperlipidemia    3. Situational anxiety    4. Encounter for screening mammogram for malignant neoplasm of breast    5. Constipation, unspecified constipation type    6. Great toe pain, left          Plan:     Essential hypertension- stable, cont rx.  Recheck BPs and cuff in 1mo, while on buspar regularly.    Other hyperlipidemia- start lipitor, recheck 3mo.  -     Lipid Panel; Future; Expected date: 05/05/2023  -     ALT (SGPT); Future; Expected date: 05/05/2023  -     atorvastatin (LIPITOR) 10 MG tablet; Take 1 tablet (10 mg total) by mouth once daily.  Dispense: 90 tablet; Refill: 3    Situational anxiety- take buspar every day, morning +/- afternoon.    Encounter for screening mammogram for malignant neoplasm of breast  -     Mammo Digital Screening Bilat w/ Andrew; Future; Expected date: 05/05/2023    Constipation, unspecified constipation type- try MgO 400mg or miralax.    Great toe pain, left  -     Ambulatory referral/consult to Podiatry; Future; Expected date: 05/12/2023

## 2023-05-05 ENCOUNTER — OFFICE VISIT (OUTPATIENT)
Dept: PRIMARY CARE CLINIC | Facility: CLINIC | Age: 78
End: 2023-05-05
Payer: MEDICARE

## 2023-05-05 VITALS
WEIGHT: 175.63 LBS | BODY MASS INDEX: 28.22 KG/M2 | SYSTOLIC BLOOD PRESSURE: 132 MMHG | TEMPERATURE: 98 F | HEIGHT: 66 IN | HEART RATE: 67 BPM | OXYGEN SATURATION: 98 % | DIASTOLIC BLOOD PRESSURE: 68 MMHG

## 2023-05-05 DIAGNOSIS — I10 ESSENTIAL HYPERTENSION: Primary | ICD-10-CM

## 2023-05-05 DIAGNOSIS — M79.675 GREAT TOE PAIN, LEFT: ICD-10-CM

## 2023-05-05 DIAGNOSIS — F41.8 SITUATIONAL ANXIETY: ICD-10-CM

## 2023-05-05 DIAGNOSIS — E78.49 OTHER HYPERLIPIDEMIA: ICD-10-CM

## 2023-05-05 DIAGNOSIS — K59.00 CONSTIPATION, UNSPECIFIED CONSTIPATION TYPE: ICD-10-CM

## 2023-05-05 DIAGNOSIS — Z12.31 ENCOUNTER FOR SCREENING MAMMOGRAM FOR MALIGNANT NEOPLASM OF BREAST: ICD-10-CM

## 2023-05-05 PROCEDURE — 99999 PR PBB SHADOW E&M-EST. PATIENT-LVL III: CPT | Mod: PBBFAC,,, | Performed by: INTERNAL MEDICINE

## 2023-05-05 PROCEDURE — 1101F PT FALLS ASSESS-DOCD LE1/YR: CPT | Mod: CPTII,S$GLB,, | Performed by: INTERNAL MEDICINE

## 2023-05-05 PROCEDURE — 99999 PR PBB SHADOW E&M-EST. PATIENT-LVL III: ICD-10-PCS | Mod: PBBFAC,,, | Performed by: INTERNAL MEDICINE

## 2023-05-05 PROCEDURE — 1159F PR MEDICATION LIST DOCUMENTED IN MEDICAL RECORD: ICD-10-PCS | Mod: CPTII,S$GLB,, | Performed by: INTERNAL MEDICINE

## 2023-05-05 PROCEDURE — 3075F PR MOST RECENT SYSTOLIC BLOOD PRESS GE 130-139MM HG: ICD-10-PCS | Mod: CPTII,S$GLB,, | Performed by: INTERNAL MEDICINE

## 2023-05-05 PROCEDURE — 3078F PR MOST RECENT DIASTOLIC BLOOD PRESSURE < 80 MM HG: ICD-10-PCS | Mod: CPTII,S$GLB,, | Performed by: INTERNAL MEDICINE

## 2023-05-05 PROCEDURE — 99214 PR OFFICE/OUTPT VISIT, EST, LEVL IV, 30-39 MIN: ICD-10-PCS | Mod: S$GLB,,, | Performed by: INTERNAL MEDICINE

## 2023-05-05 PROCEDURE — 3288F PR FALLS RISK ASSESSMENT DOCUMENTED: ICD-10-PCS | Mod: CPTII,S$GLB,, | Performed by: INTERNAL MEDICINE

## 2023-05-05 PROCEDURE — 3075F SYST BP GE 130 - 139MM HG: CPT | Mod: CPTII,S$GLB,, | Performed by: INTERNAL MEDICINE

## 2023-05-05 PROCEDURE — 99214 OFFICE O/P EST MOD 30 MIN: CPT | Mod: S$GLB,,, | Performed by: INTERNAL MEDICINE

## 2023-05-05 PROCEDURE — 1101F PR PT FALLS ASSESS DOC 0-1 FALLS W/OUT INJ PAST YR: ICD-10-PCS | Mod: CPTII,S$GLB,, | Performed by: INTERNAL MEDICINE

## 2023-05-05 PROCEDURE — 1159F MED LIST DOCD IN RCRD: CPT | Mod: CPTII,S$GLB,, | Performed by: INTERNAL MEDICINE

## 2023-05-05 PROCEDURE — 3078F DIAST BP <80 MM HG: CPT | Mod: CPTII,S$GLB,, | Performed by: INTERNAL MEDICINE

## 2023-05-05 PROCEDURE — 3288F FALL RISK ASSESSMENT DOCD: CPT | Mod: CPTII,S$GLB,, | Performed by: INTERNAL MEDICINE

## 2023-05-05 RX ORDER — ATORVASTATIN CALCIUM 10 MG/1
10 TABLET, FILM COATED ORAL DAILY
Qty: 90 TABLET | Refills: 3 | Status: SHIPPED | OUTPATIENT
Start: 2023-05-05 | End: 2023-08-15

## 2023-05-10 ENCOUNTER — OFFICE VISIT (OUTPATIENT)
Dept: PODIATRY | Facility: CLINIC | Age: 78
End: 2023-05-10
Payer: MEDICARE

## 2023-05-10 VITALS
SYSTOLIC BLOOD PRESSURE: 139 MMHG | DIASTOLIC BLOOD PRESSURE: 72 MMHG | BODY MASS INDEX: 28.5 KG/M2 | WEIGHT: 176.56 LBS | HEART RATE: 84 BPM

## 2023-05-10 DIAGNOSIS — M79.675 CHRONIC TOE PAIN, LEFT FOOT: ICD-10-CM

## 2023-05-10 DIAGNOSIS — G89.29 CHRONIC TOE PAIN, LEFT FOOT: ICD-10-CM

## 2023-05-10 DIAGNOSIS — L60.0 INGROWN TOENAIL WITHOUT INFECTION: Primary | ICD-10-CM

## 2023-05-10 PROCEDURE — 1101F PT FALLS ASSESS-DOCD LE1/YR: CPT | Mod: CPTII,,, | Performed by: PODIATRIST

## 2023-05-10 PROCEDURE — 99203 PR OFFICE/OUTPT VISIT, NEW, LEVL III, 30-44 MIN: ICD-10-PCS | Mod: 25,S$GLB,, | Performed by: PODIATRIST

## 2023-05-10 PROCEDURE — 3078F DIAST BP <80 MM HG: CPT | Mod: CPTII,,, | Performed by: PODIATRIST

## 2023-05-10 PROCEDURE — 99203 OFFICE O/P NEW LOW 30 MIN: CPT | Mod: 25,S$GLB,, | Performed by: PODIATRIST

## 2023-05-10 PROCEDURE — 3078F PR MOST RECENT DIASTOLIC BLOOD PRESSURE < 80 MM HG: ICD-10-PCS | Mod: CPTII,,, | Performed by: PODIATRIST

## 2023-05-10 PROCEDURE — 1159F PR MEDICATION LIST DOCUMENTED IN MEDICAL RECORD: ICD-10-PCS | Mod: CPTII,,, | Performed by: PODIATRIST

## 2023-05-10 PROCEDURE — 1160F PR REVIEW ALL MEDS BY PRESCRIBER/CLIN PHARMACIST DOCUMENTED: ICD-10-PCS | Mod: CPTII,,, | Performed by: PODIATRIST

## 2023-05-10 PROCEDURE — 3288F PR FALLS RISK ASSESSMENT DOCUMENTED: ICD-10-PCS | Mod: CPTII,,, | Performed by: PODIATRIST

## 2023-05-10 PROCEDURE — 99213 OFFICE O/P EST LOW 20 MIN: CPT | Performed by: PODIATRIST

## 2023-05-10 PROCEDURE — 1101F PR PT FALLS ASSESS DOC 0-1 FALLS W/OUT INJ PAST YR: ICD-10-PCS | Mod: CPTII,,, | Performed by: PODIATRIST

## 2023-05-10 PROCEDURE — 3075F PR MOST RECENT SYSTOLIC BLOOD PRESS GE 130-139MM HG: ICD-10-PCS | Mod: CPTII,,, | Performed by: PODIATRIST

## 2023-05-10 PROCEDURE — 1159F MED LIST DOCD IN RCRD: CPT | Mod: CPTII,,, | Performed by: PODIATRIST

## 2023-05-10 PROCEDURE — 1160F RVW MEDS BY RX/DR IN RCRD: CPT | Mod: CPTII,,, | Performed by: PODIATRIST

## 2023-05-10 PROCEDURE — 11750 EXCISION NAIL&NAIL MATRIX: CPT | Mod: TA,S$GLB,, | Performed by: PODIATRIST

## 2023-05-10 PROCEDURE — 1125F PR PAIN SEVERITY QUANTIFIED, PAIN PRESENT: ICD-10-PCS | Mod: CPTII,,, | Performed by: PODIATRIST

## 2023-05-10 PROCEDURE — 3075F SYST BP GE 130 - 139MM HG: CPT | Mod: CPTII,,, | Performed by: PODIATRIST

## 2023-05-10 PROCEDURE — 3288F FALL RISK ASSESSMENT DOCD: CPT | Mod: CPTII,,, | Performed by: PODIATRIST

## 2023-05-10 PROCEDURE — 11750 PR REMOVAL OF NAIL BED: ICD-10-PCS | Mod: TA,S$GLB,, | Performed by: PODIATRIST

## 2023-05-10 PROCEDURE — 1125F AMNT PAIN NOTED PAIN PRSNT: CPT | Mod: CPTII,,, | Performed by: PODIATRIST

## 2023-05-12 ENCOUNTER — HOSPITAL ENCOUNTER (OUTPATIENT)
Dept: RADIOLOGY | Facility: HOSPITAL | Age: 78
Discharge: HOME | End: 2023-05-12
Attending: INTERNAL MEDICINE
Payer: MEDICARE

## 2023-05-12 DIAGNOSIS — Z12.31 ENCOUNTER FOR SCREENING MAMMOGRAM FOR MALIGNANT NEOPLASM OF BREAST: ICD-10-CM

## 2023-05-12 PROCEDURE — 77067 SCR MAMMO BI INCL CAD: CPT | Mod: TC

## 2023-05-12 PROCEDURE — 77063 MAMMO DIGITAL SCREENING BILAT WITH TOMO: ICD-10-PCS | Mod: 26,,, | Performed by: RADIOLOGY

## 2023-05-12 PROCEDURE — 77067 SCR MAMMO BI INCL CAD: CPT | Mod: 26,,, | Performed by: RADIOLOGY

## 2023-05-12 PROCEDURE — 77063 BREAST TOMOSYNTHESIS BI: CPT | Mod: 26,,, | Performed by: RADIOLOGY

## 2023-05-12 PROCEDURE — 77067 MAMMO DIGITAL SCREENING BILAT WITH TOMO: ICD-10-PCS | Mod: 26,,, | Performed by: RADIOLOGY

## 2023-05-18 ENCOUNTER — CLINICAL SUPPORT (OUTPATIENT)
Dept: REHABILITATION | Facility: HOSPITAL | Age: 78
End: 2023-05-18
Payer: MEDICARE

## 2023-05-18 DIAGNOSIS — M85.80 OSTEOPENIA WITH HIGH RISK OF FRACTURE: ICD-10-CM

## 2023-05-18 PROCEDURE — 97110 THERAPEUTIC EXERCISES: CPT | Mod: PN

## 2023-05-18 PROCEDURE — 97162 PT EVAL MOD COMPLEX 30 MIN: CPT | Mod: PN
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
## 2023-05-21 NOTE — PROGRESS NOTES
Subjective:     Patient ID: Dalia Peña is a 77 y.o. female.    Chief Complaint: Foot Pain (Pain on big toe, left foot for as long as she can remember./Pain 4/10, non diabetic/Last visit to PCP 5-5-2023/Tennis shoes/occasional sandals)    Dalia is a 77 y.o. female who presents to the clinic complaining of painful ingrown toenail on the left foot. Patient points to left medial hallux. Patient rates pain 4/10. Patient has no other pedal complaints at this time.     Patient Active Problem List   Diagnosis    Essential hypertension    Other hyperlipidemia    Situational anxiety    Osteopenia with high risk of fracture       Medication List with Changes/Refills   Current Medications    AMLODIPINE (NORVASC) 5 MG TABLET    TAKE ONE TABLET BY MOUTH EVERY DAY    ATORVASTATIN (LIPITOR) 10 MG TABLET    Take 1 tablet (10 mg total) by mouth once daily.    BUSPIRONE (BUSPAR) 5 MG TAB    Take 1 tablet (5 mg total) by mouth 2 (two) times daily as needed (anxiety).       Review of patient's allergies indicates:   Allergen Reactions    Sulfa (sulfonamide antibiotics) Diarrhea     Other reaction(s): GI Intolerance         Past Surgical History:   Procedure Laterality Date    CATARACT EXTRACTION Right     none         History reviewed. No pertinent family history.    Social History     Socioeconomic History    Marital status:    Tobacco Use    Smoking status: Never    Smokeless tobacco: Never       Vitals:    05/10/23 1337   BP: 139/72   Pulse: 84   Weight: 80.1 kg (176 lb 9.4 oz)   PainSc:   4   PainLoc: Toe       No results found for: HGBA1C    Review of Systems   Constitutional:  Negative for chills and fever.   Respiratory:  Negative for shortness of breath.    Cardiovascular:  Negative for chest pain, palpitations, orthopnea, claudication and leg swelling.   Gastrointestinal:  Negative for diarrhea, nausea and vomiting.   Musculoskeletal:  Negative for joint pain.   Skin:  Negative for rash.   Neurological:   Negative for dizziness, tingling, sensory change, focal weakness and weakness.   Psychiatric/Behavioral: Negative.             Objective:      PHYSICAL EXAM: Apperance: Alert and orient in no distress,well developed, and with good attention to grooming and body habits  Lower Extremity Exam   VASCULAR: Dorsalis pedis pulses 2/4 left and Posterior Tibial pulses 2/4 left. Capillary fill time <blanched left.   DERMATOLOGICAL: No skin rash, subcutaneous nodules, lesions or ulcers observed. Left hallux nail observed to be mildly incurvated at medial borders and slight obstructed in the nail grooves with soft tissue. The left hallux medial nail fold is grossly edematous and firm from chronic inflammation and scarring. No purulent drainage, no odor, and no increased temperature observed to left hallux.    NEUROLOGICAL: Light touch, sharp-dull, proprioception all present and equal bilaterally.    MUSCULOSKELETAL: Muscle strength 5/5 for all foot inverters, everters, plantarflexors, and  dorsiflexors bilateral. Pain on palpation of left hallux medial nail border. No pain on palpation of dorsal nail plate left hallux.         Assessment:       ICD-10-CM ICD-9-CM   1. Ingrown toenail without infection - Left Foot  L60.0 703.0   2. Chronic toe pain, left foot - Left Foot  M79.675 729.5    G89.29 338.29       Plan:   Ingrown toenail without infection - Left Foot    Chronic toe pain, left foot - Left Foot  -     Ambulatory referral/consult to Podiatry      I counseled the patient on her conditions, regarding findings of my examination, my impressions, and usual treatment plan.   Patient consented verbal and written to permanent partial nail avulsion of left hallux.  Procedure performed: A local digital kellen was administered to the left hallux of  6cc of 1% Lidocaine plain. Attention was then directed to the left hallux to check for adequate anesthesia. A penrose drain tourniquet was applied to the base of the left hallux for  hemostasis.  Attention was then directed the medial nail border to separate using a spatula the most proximal nail border at the eponychium was released to the area of the matrix. Then the border was released at the medial aspect and at the plantar aspect distally to proximally. Using the English anvil, a cut was then made approximately 3mm lateral to the medial nail fold in a longitudinal manner at the distal aspect extending to the proximal end of the nail plate. Using a straight hemostat, the free nail plate segment was clamped and removed. Using a tissue nipper, residual tissue was then removed. The nail groove area was inspected and probed proximally with a curette for any spicules. Next a 60 second application of phenol was applied to the medial nail border. The area was flushed with copious amounts of sterile normal saline. Betadine was applied to the area and dry sterile dressing of gauze and Coflex. The penrose drain tourniquet was released. Neurovascular status was assessed and noted to be intact. Patient tolerated procedure well.   The patient was given oral and written instructions for post-op care including BID soaks of water and Epson salt followed by application of antibiotic ointment  and light dressing.  The patient was instructed to take Tylenol over-the-counter q4h prn pain and to call clinic immediately if there is increased pain, increased redness, pus, fever, chills, nausea, or vomiting present.  Patient to return 2 weeks or sooner if needed.          Maico Langley DPM  [facility]

## 2023-05-24 ENCOUNTER — OFFICE VISIT (OUTPATIENT)
Dept: PODIATRY | Facility: CLINIC | Age: 78
End: 2023-05-24
Payer: MEDICARE

## 2023-05-24 DIAGNOSIS — L60.0 INGROWN LEFT GREATER TOENAIL: Primary | ICD-10-CM

## 2023-05-24 PROCEDURE — 1159F MED LIST DOCD IN RCRD: CPT | Mod: CPTII,S$GLB,, | Performed by: PODIATRIST

## 2023-05-24 PROCEDURE — 99999 PR PBB SHADOW E&M-EST. PATIENT-LVL II: CPT | Mod: PBBFAC,,, | Performed by: PODIATRIST

## 2023-05-24 PROCEDURE — 99999 PR PBB SHADOW E&M-EST. PATIENT-LVL II: ICD-10-PCS | Mod: PBBFAC,,, | Performed by: PODIATRIST

## 2023-05-24 PROCEDURE — 1160F PR REVIEW ALL MEDS BY PRESCRIBER/CLIN PHARMACIST DOCUMENTED: ICD-10-PCS | Mod: CPTII,S$GLB,, | Performed by: PODIATRIST

## 2023-05-24 PROCEDURE — 3288F FALL RISK ASSESSMENT DOCD: CPT | Mod: CPTII,S$GLB,, | Performed by: PODIATRIST

## 2023-05-24 PROCEDURE — 99212 PR OFFICE/OUTPT VISIT, EST, LEVL II, 10-19 MIN: ICD-10-PCS | Mod: S$GLB,,, | Performed by: PODIATRIST

## 2023-05-24 PROCEDURE — 1101F PR PT FALLS ASSESS DOC 0-1 FALLS W/OUT INJ PAST YR: ICD-10-PCS | Mod: CPTII,S$GLB,, | Performed by: PODIATRIST

## 2023-05-24 PROCEDURE — 1159F PR MEDICATION LIST DOCUMENTED IN MEDICAL RECORD: ICD-10-PCS | Mod: CPTII,S$GLB,, | Performed by: PODIATRIST

## 2023-05-24 PROCEDURE — 1160F RVW MEDS BY RX/DR IN RCRD: CPT | Mod: CPTII,S$GLB,, | Performed by: PODIATRIST

## 2023-05-24 PROCEDURE — 1101F PT FALLS ASSESS-DOCD LE1/YR: CPT | Mod: CPTII,S$GLB,, | Performed by: PODIATRIST

## 2023-05-24 PROCEDURE — 99212 OFFICE O/P EST SF 10 MIN: CPT | Mod: S$GLB,,, | Performed by: PODIATRIST

## 2023-05-24 PROCEDURE — 3288F PR FALLS RISK ASSESSMENT DOCUMENTED: ICD-10-PCS | Mod: CPTII,S$GLB,, | Performed by: PODIATRIST

## 2023-05-24 RX ORDER — TETANUS TOXOID, REDUCED DIPHTHERIA TOXOID AND ACELLULAR PERTUSSIS VACCINE, ADSORBED 5; 2.5; 8; 8; 2.5 [IU]/.5ML; [IU]/.5ML; UG/.5ML; UG/.5ML; UG/.5ML
SUSPENSION INTRAMUSCULAR
COMMUNITY
Start: 2023-03-10

## 2023-05-24 NOTE — PROGRESS NOTES
Subjective:     Patient ID: Dalia Peña is a 77 y.o. female.    Chief Complaint: Follow-up (Nail avulsion f/u no pain, pt is non diabetic, pt PCP is Dr Murguia last seen 05/05/23.)    HPI: This 77 year old female returns to the clinic 2 weeks post nail procedure of left hallux .  Patient has no complaints of fever chills or sweats.  Patient denies to pain.  Patient has been dressing as instructed.     Patient Active Problem List   Diagnosis    Essential hypertension    Other hyperlipidemia    Situational anxiety    Osteopenia with high risk of fracture       Medication List with Changes/Refills   Current Medications    AMLODIPINE (NORVASC) 5 MG TABLET    TAKE ONE TABLET BY MOUTH EVERY DAY    ATORVASTATIN (LIPITOR) 10 MG TABLET    Take 1 tablet (10 mg total) by mouth once daily.    BOOSTRIX TDAP 2.5-8-5 LF-MCG-LF/0.5ML SYRG INJECTION        BUSPIRONE (BUSPAR) 5 MG TAB    Take 1 tablet (5 mg total) by mouth 2 (two) times daily as needed (anxiety).       Review of patient's allergies indicates:   Allergen Reactions    Sulfa (sulfonamide antibiotics) Diarrhea     Other reaction(s): GI Intolerance         Past Surgical History:   Procedure Laterality Date    CATARACT EXTRACTION Right     none         History reviewed. No pertinent family history.    Social History     Socioeconomic History    Marital status:    Tobacco Use    Smoking status: Never    Smokeless tobacco: Never       There were no vitals filed for this visit.    Review of Systems   Constitutional:  Negative for chills and fever.   Respiratory:  Negative for shortness of breath.    Cardiovascular:  Negative for chest pain, palpitations, orthopnea, claudication and leg swelling.   Gastrointestinal:  Negative for diarrhea, nausea and vomiting.   Musculoskeletal:  Negative for joint pain.   Skin:  Negative for rash.   Neurological:  Negative for dizziness, tingling, sensory change, focal weakness and weakness.   Psychiatric/Behavioral: Negative.            Objective:      PHYSICAL EXAM: Apperance: Alert and orient in no distress,well developed, and with good attention to grooming and body habits  Lower Extremity Exam   VASCULAR: Dorsalis pedis pulses 2/4 left and Posterior Tibial pulses 2/4 left.   DERMATOLOGICAL: No skin rash, subcutaneous nodules, lesions or ulcers observed. Left hallux medial nail border observed to be clean with pink epithealized tissue noted. Minimal erythema noted. No purulent drainage, no odor, and no increased temperature observed to left hallux.   NEUROLOGICAL: Light touch, sharp-dull, proprioception all present and equal bilaterally.    MUSCULOSKELETAL: Muscle strength 5/5 for all foot inverters, everters, plantarflexors, and  dorsiflexors bilateral. No pain on palpation of left hallux lateral nail border. No pain on palpation of dorsal nail plate left hallux.         Assessment:       ICD-10-CM ICD-9-CM   1. Ingrown left greater toenail - Left Foot  L60.0 703.0       Plan:   Ingrown left greater toenail - Left Foot      I counseled the patient on her conditions, regarding findings of my examination, my impressions, and usual treatment plan.   Patient to continue local care until completely healed with no drainage and no redness.  Patient should call the clinic immediately if any signs of infection such as fever chills sweats increased redness or pain but was otherwise discharged.       Maico Langley DPM  [facility]

## 2023-06-05 ENCOUNTER — OFFICE VISIT (OUTPATIENT)
Dept: PRIMARY CARE CLINIC | Facility: CLINIC | Age: 78
End: 2023-06-05
Payer: MEDICARE

## 2023-06-05 VITALS
OXYGEN SATURATION: 96 % | HEIGHT: 66 IN | HEART RATE: 56 BPM | TEMPERATURE: 98 F | WEIGHT: 175.31 LBS | SYSTOLIC BLOOD PRESSURE: 124 MMHG | DIASTOLIC BLOOD PRESSURE: 54 MMHG | BODY MASS INDEX: 28.17 KG/M2

## 2023-06-05 DIAGNOSIS — H61.21 IMPACTED CERUMEN OF RIGHT EAR: ICD-10-CM

## 2023-06-05 DIAGNOSIS — I10 ESSENTIAL HYPERTENSION: Primary | ICD-10-CM

## 2023-06-05 DIAGNOSIS — E78.49 OTHER HYPERLIPIDEMIA: ICD-10-CM

## 2023-06-05 DIAGNOSIS — F41.8 SITUATIONAL ANXIETY: ICD-10-CM

## 2023-06-05 PROCEDURE — 99999 PR PBB SHADOW E&M-EST. PATIENT-LVL III: CPT | Mod: PBBFAC,,, | Performed by: NURSE PRACTITIONER

## 2023-06-05 PROCEDURE — 3074F SYST BP LT 130 MM HG: CPT | Mod: CPTII,S$GLB,, | Performed by: NURSE PRACTITIONER

## 2023-06-05 PROCEDURE — 3288F FALL RISK ASSESSMENT DOCD: CPT | Mod: CPTII,S$GLB,, | Performed by: NURSE PRACTITIONER

## 2023-06-05 PROCEDURE — 99999 PR PBB SHADOW E&M-EST. PATIENT-LVL III: ICD-10-PCS | Mod: PBBFAC,,, | Performed by: NURSE PRACTITIONER

## 2023-06-05 PROCEDURE — 1101F PR PT FALLS ASSESS DOC 0-1 FALLS W/OUT INJ PAST YR: ICD-10-PCS | Mod: CPTII,S$GLB,, | Performed by: NURSE PRACTITIONER

## 2023-06-05 PROCEDURE — 3074F PR MOST RECENT SYSTOLIC BLOOD PRESSURE < 130 MM HG: ICD-10-PCS | Mod: CPTII,S$GLB,, | Performed by: NURSE PRACTITIONER

## 2023-06-05 PROCEDURE — 1159F PR MEDICATION LIST DOCUMENTED IN MEDICAL RECORD: ICD-10-PCS | Mod: CPTII,S$GLB,, | Performed by: NURSE PRACTITIONER

## 2023-06-05 PROCEDURE — 1101F PT FALLS ASSESS-DOCD LE1/YR: CPT | Mod: CPTII,S$GLB,, | Performed by: NURSE PRACTITIONER

## 2023-06-05 PROCEDURE — 3078F PR MOST RECENT DIASTOLIC BLOOD PRESSURE < 80 MM HG: ICD-10-PCS | Mod: CPTII,S$GLB,, | Performed by: NURSE PRACTITIONER

## 2023-06-05 PROCEDURE — 3288F PR FALLS RISK ASSESSMENT DOCUMENTED: ICD-10-PCS | Mod: CPTII,S$GLB,, | Performed by: NURSE PRACTITIONER

## 2023-06-05 PROCEDURE — 1159F MED LIST DOCD IN RCRD: CPT | Mod: CPTII,S$GLB,, | Performed by: NURSE PRACTITIONER

## 2023-06-05 PROCEDURE — 99215 PR OFFICE/OUTPT VISIT, EST, LEVL V, 40-54 MIN: ICD-10-PCS | Mod: S$GLB,,, | Performed by: NURSE PRACTITIONER

## 2023-06-05 PROCEDURE — 3078F DIAST BP <80 MM HG: CPT | Mod: CPTII,S$GLB,, | Performed by: NURSE PRACTITIONER

## 2023-06-05 PROCEDURE — 99215 OFFICE O/P EST HI 40 MIN: CPT | Mod: S$GLB,,, | Performed by: NURSE PRACTITIONER

## 2023-06-05 NOTE — PROGRESS NOTES
Dalia Peña  06/05/2023  [id number]    Giselle Murguia MD  Patient Care Team:  Giselle Murguia MD as PCP - General (Internal Medicine)      [facility]      Chief Complaint:  Chief Complaint   Patient presents with    [facility]     B/P recheck       History of Present Illness:    Saw Dr. Murguia on 5/5/23.  Essential hypertension- stable, cont rx.  Recheck BPs and cuff in 1mo, while on buspar regularly.    Pt returns for B/P check after 1 month of taking Buspar regularly. Pt taking amlodipine 5 mg daily.       Taking Buspar once daily in AM. Pt is not certain if Buspar is working due to recent stressors. Her older son came home for 2 weeks so sleeping arrangements were changed. Also, the grandson she is raising has finished school for summer. PT was started recently and pt developed muscle soreness in her legs but she is motivated to continue.   PT for leg strength and hip pain.     Home 150/65, 143/73, 131/71, 143/72    Hasn't been sleeping well - but got some deep sleep last night    Started Lipitor - no body aches reported    Mag oxide is helping with constipation      The following were reviewed: Active problem list, medication list, allergies, family history, social history, and Health Maintenance.     History:  Past Medical History:   Diagnosis Date    Essential (primary) hypertension     Hyperlipemia      Past Surgical History:   Procedure Laterality Date    CATARACT EXTRACTION Right     none       No family history on file.  Patient Active Problem List   Diagnosis    Essential hypertension    Other hyperlipidemia    Situational anxiety    Osteopenia with high risk of fracture    Impacted cerumen of right ear     Review of patient's allergies indicates:   Allergen Reactions    Sulfa (sulfonamide antibiotics) Diarrhea     Other reaction(s): GI Intolerance         Medications:  Current Outpatient Medications on File Prior to Visit   Medication Sig Dispense Refill    amLODIPine  (NORVASC) 5 MG tablet TAKE ONE TABLET BY MOUTH EVERY DAY 90 tablet 3    atorvastatin (LIPITOR) 10 MG tablet Take 1 tablet (10 mg total) by mouth once daily. 90 tablet 3    BOOSTRIX TDAP 2.5-8-5 Lf-mcg-Lf/0.5mL Syrg injection       busPIRone (BUSPAR) 5 MG Tab Take 1 tablet (5 mg total) by mouth 2 (two) times daily as needed (anxiety). 60 tablet 3     No current facility-administered medications on file prior to visit.       Medications have been reviewed and reconciled with patient at visit today.      Exam:  Vitals:    06/05/23 0952   BP: (!) 124/54   Pulse: (!) 56   Temp: 97.9 °F (36.6 °C)     Weight: 79.5 kg (175 lb 4.8 oz)   Body mass index is 28.29 kg/m².      BP Readings from Last 3 Encounters:   06/05/23 (!) 124/54   05/10/23 139/72   05/05/23 132/68     Wt Readings from Last 3 Encounters:   06/05/23 0952 79.5 kg (175 lb 4.8 oz)   05/10/23 1337 80.1 kg (176 lb 9.4 oz)   05/05/23 1051 79.7 kg (175 lb 9.6 oz)          Review of Systems   Constitutional:  Positive for malaise/fatigue. Negative for chills and fever.   HENT:  Positive for congestion. Negative for ear discharge, ear pain and sore throat.         Post nasal drip   Respiratory:  Negative for cough and shortness of breath.    Cardiovascular:  Negative for chest pain, palpitations and leg swelling.   Gastrointestinal:  Negative for abdominal pain, constipation, diarrhea, nausea and vomiting.   Genitourinary:  Negative for dysuria and frequency.   Musculoskeletal:  Positive for myalgias. Negative for back pain.   Neurological:  Negative for dizziness, focal weakness and headaches.   Psychiatric/Behavioral:  Negative for depression. The patient is nervous/anxious.      Physical Exam  Vitals reviewed.   Constitutional:       General: She is not in acute distress.     Appearance: Normal appearance.   HENT:      Head: Normocephalic and atraumatic.      Comments: Hearing aid in left ear     Right Ear: There is impacted cerumen.      Nose: Nose normal. No  congestion or rhinorrhea.      Mouth/Throat:      Mouth: Mucous membranes are moist.   Eyes:      General: No scleral icterus.     Conjunctiva/sclera: Conjunctivae normal.   Cardiovascular:      Rate and Rhythm: Normal rate and regular rhythm.      Heart sounds: No murmur heard.  Pulmonary:      Effort: Pulmonary effort is normal. No respiratory distress.      Breath sounds: Normal breath sounds.   Abdominal:      Palpations: Abdomen is soft. There is no mass.      Tenderness: There is no abdominal tenderness.   Musculoskeletal:         General: No swelling or deformity. Normal range of motion.      Cervical back: Normal range of motion and neck supple.      Right lower leg: No edema.      Left lower leg: No edema.   Lymphadenopathy:      Cervical: No cervical adenopathy.   Skin:     General: Skin is warm and dry.   Neurological:      Mental Status: She is alert and oriented to person, place, and time. Mental status is at baseline.   Psychiatric:         Mood and Affect: Mood normal.         Thought Content: Thought content normal.       Laboratory Reviewed:   Lab Results   Component Value Date    WBC 6.90 03/09/2023    HGB 12.9 03/09/2023    HCT 40.6 03/09/2023     03/09/2023    CHOL 207 (H) 03/09/2023    TRIG 183 (H) 03/09/2023    HDL 47 03/09/2023    ALT 18 03/09/2023    AST 19 03/09/2023     03/09/2023    K 4.4 03/09/2023     03/09/2023    CREATININE 0.8 03/09/2023    BUN 22 03/09/2023    CO2 27 03/09/2023    TSH 0.883 03/09/2023           Health Maintenance  Health Maintenance Topics with due status: Not Due       Topic Last Completion Date    DEXA Scan 09/23/2022    Lipid Panel 03/09/2023     Health Maintenance Due   Topic Date Due    Hepatitis C Screening  Never done    TETANUS VACCINE  Never done    COVID-19 Vaccine (3 - Pfizer series) 07/21/2021       Assessment and Plan:  1. Essential hypertension  Assessment & Plan:  BP cuff calibrated  Taking amlodipine 5 mg daily  Home 150/65, 143/73,  131/71, 143/72  Plans to increase amlodipine 10 mg daily - Pt wants to continue return to her everyday lifestyle and will continue to monitor her BP at home  RTC 2 weeks for recheck      2. Other hyperlipidemia  Assessment & Plan:  Continue Lipitor 10 mg daily      3. Situational anxiety  Assessment & Plan:  Offered referral to LCSW - pt wishes to pursue just physical therapy at this time      4. Impacted cerumen of right ear  Assessment & Plan:  Debrox ear drops  Irrigation in 2 weeks                   -Patient's lab results were reviewed and discussed with patient  -Treatment options and alternatives were discussed with the patient. Patient expressed understanding. Patient was given the opportunity to ask questions and be an active participant in their medical care. Patient had no further questions or concerns at this time.         Future Appointments   Date Time Provider Department Center   6/19/2023  9:20 AM [place] [facility]   8/4/2023 10:00 AM NURSE, [facility]   8/11/2023 10:20 AM Giselle Murguia [place] [facility]          After visit summary printed and given to patient upon discharge.  Patient goals and care plan are included in After visit summary.    Total medical decision making time was 44 min.    The following issues were discussed: The primary encounter diagnosis was Essential hypertension. Diagnoses of Other hyperlipidemia, Situational anxiety, and Impacted cerumen of right ear were also pertinent to this visit.    Health maintenance needs, recent test results and goals of care discussed with pt and questions answered.           JOANIE Galarza, NP-[facility] [address] [place] [address]

## 2023-06-05 NOTE — ASSESSMENT & PLAN NOTE
BP cuff calibrated  Taking amlodipine 5 mg daily  Home 150/65, 143/73, 131/71, 143/72  Plans to increase amlodipine 10 mg daily - Pt wants to continue return to her everyday lifestyle and will continue to monitor her BP at home  RTC 2 weeks for recheck

## 2023-06-11 ENCOUNTER — PATIENT MESSAGE (OUTPATIENT)
Dept: PRIMARY CARE CLINIC | Facility: CLINIC | Age: 78
End: 2023-06-11
Payer: MEDICARE

## 2023-06-12 DIAGNOSIS — N30.00 ACUTE CYSTITIS WITHOUT HEMATURIA: Primary | ICD-10-CM

## 2023-06-12 RX ORDER — NITROFURANTOIN 25; 75 MG/1; MG/1
100 CAPSULE ORAL 2 TIMES DAILY
Qty: 14 CAPSULE | Refills: 0 | Status: SHIPPED | OUTPATIENT
Start: 2023-06-12 | End: 2023-06-19

## 2023-06-12 NOTE — PROGRESS NOTES
Health  Consult Note    Name: Dalia Peña  Clinic Number: [id number]  Physician: No ref. provider found  Past Medical History:   Diagnosis Date    Essential (primary) hypertension     Hyperlipemia      Wants to increase leg strength and get back into an exercise routine.       INITIAL date: 6/16/23  One a scale of 1-10, with 10 being 100% happy, how would you rate your happiness in each of the wellness areas below?    Happiness:         1     2     3     4     5     6     7     8     9     10    Initial Date: DC Date: +/- Total Change   Exercise/Movement Walks, has young son, active likes to garden,      Physical Health Weak hips, and legs     Stress Level High, osteopenia     Nutrition Lieftime weight watcher, breakfast light snack then dinner, little meat, meat gravy rice and vegetable,  eats out at least 5x a week, farmers market,      Sleep Has trouble      Play x     Body Image x     Relationships X      Energy/Vitality x         Health : Roxann Villarreal MA  6/12/2023

## 2023-06-16 ENCOUNTER — DOCUMENTATION ONLY (OUTPATIENT)
Dept: REHABILITATION | Facility: HOSPITAL | Age: 78
End: 2023-06-16
Payer: MEDICARE

## 2023-06-19 ENCOUNTER — TELEPHONE (OUTPATIENT)
Dept: PRIMARY CARE CLINIC | Facility: CLINIC | Age: 78
End: 2023-06-19
Payer: MEDICARE

## 2023-06-19 ENCOUNTER — OFFICE VISIT (OUTPATIENT)
Dept: PRIMARY CARE CLINIC | Facility: CLINIC | Age: 78
End: 2023-06-19
Payer: MEDICARE

## 2023-06-19 VITALS
DIASTOLIC BLOOD PRESSURE: 62 MMHG | BODY MASS INDEX: 28.29 KG/M2 | SYSTOLIC BLOOD PRESSURE: 118 MMHG | DIASTOLIC BLOOD PRESSURE: 77 MMHG | TEMPERATURE: 98 F | HEART RATE: 55 BPM | HEIGHT: 66 IN | SYSTOLIC BLOOD PRESSURE: 136 MMHG | OXYGEN SATURATION: 96 %

## 2023-06-19 DIAGNOSIS — E78.49 OTHER HYPERLIPIDEMIA: ICD-10-CM

## 2023-06-19 DIAGNOSIS — Z00.00 ENCOUNTER FOR PREVENTIVE HEALTH EXAMINATION: Primary | ICD-10-CM

## 2023-06-19 DIAGNOSIS — I10 ESSENTIAL HYPERTENSION: ICD-10-CM

## 2023-06-19 DIAGNOSIS — M85.80 OSTEOPENIA WITH HIGH RISK OF FRACTURE: ICD-10-CM

## 2023-06-19 DIAGNOSIS — R26.9 ABNORMALITY OF GAIT AND MOBILITY: ICD-10-CM

## 2023-06-19 DIAGNOSIS — F41.8 SITUATIONAL ANXIETY: ICD-10-CM

## 2023-06-19 PROCEDURE — 99999 PR PBB SHADOW E&M-EST. PATIENT-LVL III: CPT | Mod: PBBFAC,,, | Performed by: NURSE PRACTITIONER

## 2023-06-19 PROCEDURE — G0439 PPPS, SUBSEQ VISIT: HCPCS | Mod: S$GLB,,, | Performed by: NURSE PRACTITIONER

## 2023-06-19 PROCEDURE — 3074F PR MOST RECENT SYSTOLIC BLOOD PRESSURE < 130 MM HG: ICD-10-PCS | Mod: CPTII,S$GLB,, | Performed by: NURSE PRACTITIONER

## 2023-06-19 PROCEDURE — 1170F FXNL STATUS ASSESSED: CPT | Mod: CPTII,S$GLB,, | Performed by: NURSE PRACTITIONER

## 2023-06-19 PROCEDURE — 3078F PR MOST RECENT DIASTOLIC BLOOD PRESSURE < 80 MM HG: ICD-10-PCS | Mod: CPTII,S$GLB,, | Performed by: NURSE PRACTITIONER

## 2023-06-19 PROCEDURE — 3288F FALL RISK ASSESSMENT DOCD: CPT | Mod: CPTII,S$GLB,, | Performed by: NURSE PRACTITIONER

## 2023-06-19 PROCEDURE — 1101F PR PT FALLS ASSESS DOC 0-1 FALLS W/OUT INJ PAST YR: ICD-10-PCS | Mod: CPTII,S$GLB,, | Performed by: NURSE PRACTITIONER

## 2023-06-19 PROCEDURE — 1170F PR FUNCTIONAL STATUS ASSESSED: ICD-10-PCS | Mod: CPTII,S$GLB,, | Performed by: NURSE PRACTITIONER

## 2023-06-19 PROCEDURE — 3074F SYST BP LT 130 MM HG: CPT | Mod: CPTII,S$GLB,, | Performed by: NURSE PRACTITIONER

## 2023-06-19 PROCEDURE — 99999 PR PBB SHADOW E&M-EST. PATIENT-LVL III: ICD-10-PCS | Mod: PBBFAC,,, | Performed by: NURSE PRACTITIONER

## 2023-06-19 PROCEDURE — 3288F PR FALLS RISK ASSESSMENT DOCUMENTED: ICD-10-PCS | Mod: CPTII,S$GLB,, | Performed by: NURSE PRACTITIONER

## 2023-06-19 PROCEDURE — 1101F PT FALLS ASSESS-DOCD LE1/YR: CPT | Mod: CPTII,S$GLB,, | Performed by: NURSE PRACTITIONER

## 2023-06-19 PROCEDURE — G0439 PR MEDICARE ANNUAL WELLNESS SUBSEQUENT VISIT: ICD-10-PCS | Mod: S$GLB,,, | Performed by: NURSE PRACTITIONER

## 2023-06-19 PROCEDURE — 1159F PR MEDICATION LIST DOCUMENTED IN MEDICAL RECORD: ICD-10-PCS | Mod: CPTII,S$GLB,, | Performed by: NURSE PRACTITIONER

## 2023-06-19 PROCEDURE — 1159F MED LIST DOCD IN RCRD: CPT | Mod: CPTII,S$GLB,, | Performed by: NURSE PRACTITIONER

## 2023-06-19 PROCEDURE — 3078F DIAST BP <80 MM HG: CPT | Mod: CPTII,S$GLB,, | Performed by: NURSE PRACTITIONER

## 2023-06-19 NOTE — ASSESSMENT & PLAN NOTE
Latest Reference Range & Units Most Recent   Cholesterol 120 - 199 mg/dL 207 (H)  3/9/23 16:16   HDL 40 - 75 mg/dL 47  3/9/23 16:16   HDL/Cholesterol Ratio 20.0 - 50.0 % 22.7  3/9/23 16:16   LDL Cholesterol External 63.0 - 159.0 mg/dL 123.4  3/9/23 16:16   Non-HDL Cholesterol mg/dL 160  3/9/23 16:16   Total Cholesterol/HDL Ratio 2.0 - 5.0  4.4  3/9/23 16:16   Triglycerides 30 - 150 mg/dL 183 (H)  3/9/23 16:16   Continue working on activity level  Control B/P  DASH diet  Continue atorvastatin  F/U with PCP

## 2023-06-19 NOTE — ASSESSMENT & PLAN NOTE
Pt has had increased stressors over the last 3 months  Prescribed Buspar 5 mg bid but taking once daily in AM  Pt is not sure if medications is helping  Assess and monitor  F/U with PCP

## 2023-06-19 NOTE — ASSESSMENT & PLAN NOTE
Controlled  Continue amlodipine 5 mg daily  Pt is slowly increasing activity level  Monitor and record  Follow up with PCP

## 2023-06-19 NOTE — ASSESSMENT & PLAN NOTE
Up to date with   Review for Opioid Screening: Pt does not have Rx for Opioids      Review for Substance Use Disorders: Patient does not use illicit substances as reported

## 2023-06-19 NOTE — PROGRESS NOTES
5 min cardio on bike  Sit to stands with 4 lb weight ball 2x10  Standing marches against wall 2x20  Seated leg extensions 3x20  Forward kicks 10x3  Backward kicks 10x3  Side kicks 10x3

## 2023-06-19 NOTE — PROGRESS NOTES
"    Dalia Peña presented for a follow-up Medicare AWV today. The following components were reviewed and updated:    Medical history  Family History  Social history  Allergies and Current Medications  Health Risk Assessment  Health Maintenance  Care Team    **See Completed Assessments for Annual Wellness visit with in the encounter summary    The following assessments were completed:  Depression Screening  Cognitive function Screening  Timed Get Up Test  Whisper Test  Nutrition Screening    Vitals:    06/19/23 0920   BP: 118/62   BP Location: Right arm   Pulse: (!) 55   Temp: 97.7 °F (36.5 °C)   TempSrc: Oral   SpO2: 96%   Height: 5' 6" (1.676 m)     Body mass index is 28.29 kg/m².       Feeling better, almost completed ABX for UTI.  fell while in [place]  She took a fall on the granite - Only with right anterior abrasion.   Taking Buspar once daily in AM - not sure if helping  She has a chatic life  Still taking amlodipine 5 mg daily     Review of Systems   Constitutional:  Negative for appetite change, fatigue and fever.   HENT:  Positive for postnasal drip.         Chronic allergic rhinitis   Respiratory:  Positive for cough. Negative for shortness of breath.    Cardiovascular:  Negative for chest pain, palpitations and leg swelling.   Gastrointestinal:  Negative for abdominal pain, constipation, diarrhea, nausea and vomiting.   Genitourinary:  Negative for difficulty urinating, dysuria, frequency and hematuria.   Musculoskeletal:  Positive for myalgias.   Skin:  Negative for pallor, rash and wound.   Neurological:  Negative for dizziness, speech difficulty, weakness and headaches.   Psychiatric/Behavioral:  Positive for sleep disturbance. The patient is nervous/anxious.          Physical Exam  Vitals reviewed.   Constitutional:       General: She is not in acute distress.     Appearance: Normal appearance.   HENT:      Head: Normocephalic and atraumatic.      Right Ear: There is impacted cerumen.      " Left Ear: There is impacted cerumen.      Nose: Nose normal.      Mouth/Throat:      Mouth: Mucous membranes are moist.   Eyes:      General: No scleral icterus.     Conjunctiva/sclera: Conjunctivae normal.   Cardiovascular:      Rate and Rhythm: Normal rate and regular rhythm.      Heart sounds: No murmur heard.  Pulmonary:      Effort: Pulmonary effort is normal. No respiratory distress.      Breath sounds: Normal breath sounds.   Abdominal:      Palpations: Abdomen is soft. There is no mass.      Tenderness: There is no abdominal tenderness.   Musculoskeletal:         General: No swelling or deformity. Normal range of motion.      Cervical back: Normal range of motion and neck supple.      Right lower leg: No edema.      Left lower leg: No edema.   Lymphadenopathy:      Cervical: No cervical adenopathy.   Skin:     General: Skin is warm and dry.   Neurological:      Mental Status: She is alert and oriented to person, place, and time. Mental status is at baseline.   Psychiatric:         Mood and Affect: Mood normal.         Thought Content: Thought content normal.          Health Maintenance         Date Due Completion Date    Hepatitis C Screening Never done ---    TETANUS VACCINE Never done ---    COVID-19 Vaccine (3 - Pfizer series) 07/21/2021 5/26/2021    DEXA Scan 09/23/2025 9/23/2022    Lipid Panel 03/09/2028 3/9/2023              PROBLEM LIST ITEMS ADDRESSED THIS VISIT:    1. Encounter for preventive health examination  Assessment & Plan:  Up to date with   Review for Opioid Screening: Pt does not have Rx for Opioids      Review for Substance Use Disorders: Patient does not use illicit substances as reported       2. Osteopenia with high risk of fracture  Overview:  FINDINGS:  The L1 to L4 vertebral bone mineral density is equal to 1.202 g/cm squared with a T score of 0.1.     The left femoral neck bone mineral density is equal to 0.806 g/cm squared with a T score of -1.7.   There is a 12.8% risk of a  major osteoporotic fracture and a 3% risk of hip fracture in the next 10 years (FRAX).   Impression:   Osteopenia    Assessment & Plan:  Continue weight bearing activity  Pt participates in PT for leg strengthening  Assess and monitor  Follow up with PcP      3. Other hyperlipidemia  Assessment & Plan:   Latest Reference Range & Units Most Recent   Cholesterol 120 - 199 mg/dL 207 (H)  3/9/23 16:16   HDL 40 - 75 mg/dL 47  3/9/23 16:16   HDL/Cholesterol Ratio 20.0 - 50.0 % 22.7  3/9/23 16:16   LDL Cholesterol External 63.0 - 159.0 mg/dL 123.4  3/9/23 16:16   Non-HDL Cholesterol mg/dL 160  3/9/23 16:16   Total Cholesterol/HDL Ratio 2.0 - 5.0  4.4  3/9/23 16:16   Triglycerides 30 - 150 mg/dL 183 (H)  3/9/23 16:16   Continue working on activity level  Control B/P  DASH diet  Continue atorvastatin  F/U with PCP      4. Essential hypertension  Assessment & Plan:  Controlled  Continue amlodipine 5 mg daily  Pt is slowly increasing activity level  Monitor and record  Follow up with PCP      5. Situational anxiety  Assessment & Plan:  Pt has had increased stressors over the last 3 months  Prescribed Buspar 5 mg bid but taking once daily in AM  Pt is not sure if medications is helping  Assess and monitor  F/U with PCP      6. Abnormality of gait and mobility  Assessment & Plan:  Recent fall  Increasing activity level and had PT  Follow up with PCP          Provided Dalia with a 5-10 year written screening schedule and personal prevention plan. Recommendations were developed using the USPSTF age appropriate recommendations. Education, counseling, and referrals were provided as needed.  After Visit Summary printed and given to patient which includes a list of additional screenings\tests needed.    Future Appointments   Date Time Provider Department Center   6/22/2023  2:00 PM Roxann Villarreal MA [facility]   8/4/2023 10:00 AM NURSE, [facility]   8/11/2023 10:20 AM MD WILEY Fraga  [facility]          JOANIE Galarza, NP-C  [facility] [address] [place] [address]     I offered to discuss advanced care planning, including how to pick a person who would make decisions for you if you were unable to make them for yourself, called a health care power of , and what kind of decisions you might make such as use of life sustaining treatments such as ventilators and tube feeding when faced with a life limiting illness recorded on a living will that they will need to know. (How you want to be cared for as you near the end of your natural life)     X  Patient has advanced directives written and agrees to provide copies to the institution.

## 2023-06-19 NOTE — ASSESSMENT & PLAN NOTE
Continue weight bearing activity  Pt participates in PT for leg strengthening  Assess and monitor  Follow up with PcP

## 2023-06-19 NOTE — PATIENT INSTRUCTIONS
Counseling and Referral of Other Preventative  (Italic type indicates deductible and co-insurance are waived)    Patient Name: Dalia Peña  Today's Date: 6/19/2023    Health Maintenance       Date Due Completion Date    Hepatitis C Screening Never done ---    TETANUS VACCINE Never done ---    COVID-19 Vaccine (3 - Pfizer series) 07/21/2021 5/26/2021    DEXA Scan 09/23/2025 9/23/2022    Lipid Panel 03/09/2028 3/9/2023        No orders of the defined types were placed in this encounter.    The following information is provided to all patients.  This information is to help you find resources for any of the problems found today that may be affecting your health:                Living healthy guide: www.[facility].[place].gov      Understanding Diabetes: www.diabetes.org      Eating healthy: www.cdc.gov/healthyweight      [facility] home safety checklist: www.cdc.gov/steadi/patient.html      Agency on Aging: www.goea.[place].[facility]      Alcoholics anonymous (AA): www.aa.org      Physical Activity: www.david.nih.gov/px0scub      Tobacco use: www.quitwithusla.org

## 2023-06-22 ENCOUNTER — DOCUMENTATION ONLY (OUTPATIENT)
Dept: REHABILITATION | Facility: HOSPITAL | Age: 78
End: 2023-06-22
Payer: MEDICARE

## 2023-06-26 ENCOUNTER — OFFICE VISIT (OUTPATIENT)
Dept: PRIMARY CARE CLINIC | Facility: CLINIC | Age: 78
End: 2023-06-26
Payer: MEDICARE

## 2023-06-26 VITALS
DIASTOLIC BLOOD PRESSURE: 68 MMHG | TEMPERATURE: 98 F | SYSTOLIC BLOOD PRESSURE: 142 MMHG | OXYGEN SATURATION: 96 % | WEIGHT: 174 LBS | BODY MASS INDEX: 27.97 KG/M2 | HEART RATE: 46 BPM | HEIGHT: 66 IN

## 2023-06-26 DIAGNOSIS — R30.0 DYSURIA: Primary | ICD-10-CM

## 2023-06-26 LAB
BACTERIA #/AREA URNS AUTO: ABNORMAL /HPF
BILIRUB UR QL STRIP: NEGATIVE
CLARITY UR REFRACT.AUTO: ABNORMAL
COLOR UR AUTO: ABNORMAL
GLUCOSE UR QL STRIP: NEGATIVE
HGB UR QL STRIP: ABNORMAL
HYALINE CASTS UR QL AUTO: 0 /LPF
KETONES UR QL STRIP: NEGATIVE
LEUKOCYTE ESTERASE UR QL STRIP: ABNORMAL
MICROSCOPIC COMMENT: ABNORMAL
NITRITE UR QL STRIP: NEGATIVE
PH UR STRIP: 5 [PH] (ref 5–8)
PROT UR QL STRIP: ABNORMAL
RBC #/AREA URNS AUTO: 33 /HPF (ref 0–4)
SP GR UR STRIP: 1.01 (ref 1–1.03)
URN SPEC COLLECT METH UR: ABNORMAL
WBC #/AREA URNS AUTO: >100 /HPF (ref 0–5)

## 2023-06-26 PROCEDURE — 99999 PR PBB SHADOW E&M-EST. PATIENT-LVL III: CPT | Mod: PBBFAC,,, | Performed by: NURSE PRACTITIONER

## 2023-06-26 PROCEDURE — 1101F PR PT FALLS ASSESS DOC 0-1 FALLS W/OUT INJ PAST YR: ICD-10-PCS | Mod: CPTII,S$GLB,, | Performed by: NURSE PRACTITIONER

## 2023-06-26 PROCEDURE — 99213 PR OFFICE/OUTPT VISIT, EST, LEVL III, 20-29 MIN: ICD-10-PCS | Mod: S$GLB,,, | Performed by: NURSE PRACTITIONER

## 2023-06-26 PROCEDURE — 3077F SYST BP >= 140 MM HG: CPT | Mod: CPTII,S$GLB,, | Performed by: NURSE PRACTITIONER

## 2023-06-26 PROCEDURE — 3078F PR MOST RECENT DIASTOLIC BLOOD PRESSURE < 80 MM HG: ICD-10-PCS | Mod: CPTII,S$GLB,, | Performed by: NURSE PRACTITIONER

## 2023-06-26 PROCEDURE — 87088 URINE BACTERIA CULTURE: CPT | Performed by: NURSE PRACTITIONER

## 2023-06-26 PROCEDURE — 87077 CULTURE AEROBIC IDENTIFY: CPT | Performed by: NURSE PRACTITIONER

## 2023-06-26 PROCEDURE — 3288F FALL RISK ASSESSMENT DOCD: CPT | Mod: CPTII,S$GLB,, | Performed by: NURSE PRACTITIONER

## 2023-06-26 PROCEDURE — 87186 SC STD MICRODIL/AGAR DIL: CPT | Performed by: NURSE PRACTITIONER

## 2023-06-26 PROCEDURE — 99213 OFFICE O/P EST LOW 20 MIN: CPT | Mod: S$GLB,,, | Performed by: NURSE PRACTITIONER

## 2023-06-26 PROCEDURE — 87086 URINE CULTURE/COLONY COUNT: CPT | Performed by: NURSE PRACTITIONER

## 2023-06-26 PROCEDURE — 1159F MED LIST DOCD IN RCRD: CPT | Mod: CPTII,S$GLB,, | Performed by: NURSE PRACTITIONER

## 2023-06-26 PROCEDURE — 3288F PR FALLS RISK ASSESSMENT DOCUMENTED: ICD-10-PCS | Mod: CPTII,S$GLB,, | Performed by: NURSE PRACTITIONER

## 2023-06-26 PROCEDURE — 1159F PR MEDICATION LIST DOCUMENTED IN MEDICAL RECORD: ICD-10-PCS | Mod: CPTII,S$GLB,, | Performed by: NURSE PRACTITIONER

## 2023-06-26 PROCEDURE — 81001 URINALYSIS AUTO W/SCOPE: CPT | Performed by: NURSE PRACTITIONER

## 2023-06-26 PROCEDURE — 3077F PR MOST RECENT SYSTOLIC BLOOD PRESSURE >= 140 MM HG: ICD-10-PCS | Mod: CPTII,S$GLB,, | Performed by: NURSE PRACTITIONER

## 2023-06-26 PROCEDURE — 3078F DIAST BP <80 MM HG: CPT | Mod: CPTII,S$GLB,, | Performed by: NURSE PRACTITIONER

## 2023-06-26 PROCEDURE — 99999 PR PBB SHADOW E&M-EST. PATIENT-LVL III: ICD-10-PCS | Mod: PBBFAC,,, | Performed by: NURSE PRACTITIONER

## 2023-06-26 PROCEDURE — 1101F PT FALLS ASSESS-DOCD LE1/YR: CPT | Mod: CPTII,S$GLB,, | Performed by: NURSE PRACTITIONER

## 2023-06-26 RX ORDER — CIPROFLOXACIN 500 MG/1
500 TABLET ORAL 2 TIMES DAILY
Qty: 14 TABLET | Refills: 0 | Status: SHIPPED | OUTPATIENT
Start: 2023-06-26 | End: 2023-07-03

## 2023-06-26 NOTE — ASSESSMENT & PLAN NOTE
Pt with recent UTI, treated with macrodatin  Still reports symptoms  Cipro Rx  U/A culture pending

## 2023-06-26 NOTE — PROGRESS NOTES
Dalia Peña  06/26/2023  [id number]    Giselle Murguia MD  Patient Care Team:  Giselle Murguia MD as PCP - General (Internal Medicine)      [facility] Note      Chief Complaint:  Chief Complaint   Patient presents with    Follow-up     Pt has been having recurrent urinary tract infections.        History of Present Illness:  Completed Macrodatin 100 mg bid x 7 days recently. Still having mild symptoms   Five days ago pt noticed lower back discomfort and suprapubic pressure  Feels anxious, + urinary frequency, + chills - just not feeling well, general malaise    Saturday pm did not sleep at all  Slept better last night  Took pyridium  Took AZO yeast last night - got up twice during the night to urinate. Pt denies vaginal itching  UTI about yearly                  The following were reviewed: Active problem list, medication list, allergies, family history, social history, and Health Maintenance.     History:  Past Medical History:   Diagnosis Date    Essential (primary) hypertension     Hyperlipemia      Past Surgical History:   Procedure Laterality Date    CATARACT EXTRACTION Right     none       History reviewed. No pertinent family history.  Patient Active Problem List   Diagnosis    Essential hypertension    Other hyperlipidemia    Situational anxiety    Osteopenia with high risk of fracture    Impacted cerumen of right ear    Encounter for preventive health examination    Abnormality of gait and mobility    Dysuria     Review of patient's allergies indicates:   Allergen Reactions    Sulfa (sulfonamide antibiotics) Diarrhea     Other reaction(s): GI Intolerance         Medications:  Current Outpatient Medications on File Prior to Visit   Medication Sig Dispense Refill    amLODIPine (NORVASC) 5 MG tablet TAKE ONE TABLET BY MOUTH EVERY DAY 90 tablet 3    atorvastatin (LIPITOR) 10 MG tablet Take 1 tablet (10 mg total) by mouth once daily. 90 tablet 3    busPIRone (BUSPAR) 5 MG Tab Take 1 tablet  (5 mg total) by mouth 2 (two) times daily as needed (anxiety). 60 tablet 3    BOOSTRIX TDAP 2.5-8-5 Lf-mcg-Lf/0.5mL Syrg injection        No current facility-administered medications on file prior to visit.       Medications have been reviewed and reconciled with patient at visit today.      Exam:  Vitals:    06/26/23 1129   BP: (!) 142/68   Pulse: (!) 46   Temp: 97.7 °F (36.5 °C)     Weight: 78.9 kg (174 lb)   Body mass index is 28.08 kg/m².      BP Readings from Last 3 Encounters:   06/26/23 (!) 142/68   06/19/23 118/62   06/19/23 136/77     Wt Readings from Last 3 Encounters:   06/26/23 1129 78.9 kg (174 lb)   06/05/23 0952 79.5 kg (175 lb 4.8 oz)   05/10/23 1337 80.1 kg (176 lb 9.4 oz)          Review of Systems   Constitutional:  Positive for chills and malaise/fatigue. Negative for fever.   HENT:  Negative for congestion, ear discharge, ear pain and sore throat.    Respiratory:  Negative for cough and shortness of breath.    Cardiovascular:  Negative for chest pain, palpitations and leg swelling.   Gastrointestinal:  Negative for abdominal pain, diarrhea, nausea and vomiting.   Genitourinary:  Positive for frequency. Negative for dysuria.   Musculoskeletal:  Positive for back pain. Negative for myalgias.   Neurological:  Negative for dizziness, focal weakness and headaches.   Psychiatric/Behavioral:  Negative for depression. The patient is nervous/anxious.      Physical Exam  Vitals reviewed.   Constitutional:       General: She is not in acute distress.     Appearance: Normal appearance.   HENT:      Head: Normocephalic and atraumatic.      Mouth/Throat:      Mouth: Mucous membranes are moist.   Eyes:      Conjunctiva/sclera: Conjunctivae normal.   Cardiovascular:      Rate and Rhythm: Normal rate and regular rhythm.      Heart sounds: No murmur heard.  Pulmonary:      Effort: Pulmonary effort is normal. No respiratory distress.      Breath sounds: Normal breath sounds.   Abdominal:      Palpations: Abdomen  is soft. There is no mass.      Tenderness: There is abdominal tenderness in the suprapubic area.   Musculoskeletal:         General: No swelling or deformity. Normal range of motion.      Cervical back: Normal range of motion and neck supple.      Right lower leg: No edema.      Left lower leg: No edema.   Lymphadenopathy:      Cervical: No cervical adenopathy.   Skin:     General: Skin is warm and dry.   Neurological:      Mental Status: She is alert and oriented to person, place, and time. Mental status is at baseline.   Psychiatric:         Mood and Affect: Mood normal.         Thought Content: Thought content normal.       Laboratory Reviewed:   Lab Results   Component Value Date    WBC 6.90 03/09/2023    HGB 12.9 03/09/2023    HCT 40.6 03/09/2023     03/09/2023    CHOL 207 (H) 03/09/2023    TRIG 183 (H) 03/09/2023    HDL 47 03/09/2023    ALT 18 03/09/2023    AST 19 03/09/2023     03/09/2023    K 4.4 03/09/2023     03/09/2023    CREATININE 0.8 03/09/2023    BUN 22 03/09/2023    CO2 27 03/09/2023    TSH 0.883 03/09/2023           Health Maintenance  Health Maintenance Topics with due status: Not Due       Topic Last Completion Date    DEXA Scan 09/23/2022    Lipid Panel 03/09/2023     Health Maintenance Due   Topic Date Due    Hepatitis C Screening  Never done    TETANUS VACCINE  Never done    COVID-19 Vaccine (3 - Pfizer series) 07/21/2021       Assessment and Plan:  1. Dysuria  Assessment & Plan:  Pt with recent UTI, treated with macrodatin  Still reports symptoms  Cipro Rx  U/A culture pending    Orders:  -     Urinalysis, Reflex to Urine Culture Urine, Clean Catch    Other orders  -     ciprofloxacin HCl (CIPRO) 500 MG tablet; Take 1 tablet (500 mg total) by mouth 2 (two) times daily. for 7 days  Dispense: 14 tablet; Refill: 0                 -Patient's lab results were reviewed and discussed with patient  -Treatment options and alternatives were discussed with the patient. Patient  expressed understanding. Patient was given the opportunity to ask questions and be an active participant in their medical care. Patient had no further questions or concerns at this time.         Future Appointments   Date Time Provider Department Center   8/4/2023 10:00 AM N[facility]SE, [facility]   8/11/2023  9:30 AM Roxann Villarreal MA [facility]   8/11/2023 10:20 AM Giselle Murguia MD [facility]          After visit summary printed and given to patient upon discharge.  Patient goals and care plan are included in After visit summary.    Total medical decision making time was 20 min.    The following issues were discussed: The encounter diagnosis was Dysuria.    Health maintenance needs, recent test results and goals of care discussed with pt and questions answered.           JOANIE Galarza, NP-C  [facility] [address] [place] [address]

## 2023-06-30 LAB — BACTERIA UR CULT: ABNORMAL

## 2023-07-28 NOTE — PROGRESS NOTES
"Subjective:      Patient ID: Dalia Peña is a 78 y.o. female.    Chief Complaint: Establish Care and Follow-up (3 month f/u)      HPI  Here for follow up of medical problems.  In past month, lightheaded/fuzzy feeling in head on and off.  BP at home 130-140/70 range.  HR 40s lately at home.    Updated/ annual due 3/24:  HM:  11/22 fluvax, 5/21 covid vaccines, 2/17 [id number], 11/18 [id number], 3/23 TDaP, 2022 Shingrix x2, 5/23 MMG, 9/22 BMD rep 2y, Cscope at 73yo rep 10y.     Review of Systems   Constitutional:  Negative for chills, diaphoresis and fever.   Respiratory:  Negative for cough and shortness of breath.    Cardiovascular:  Negative for chest pain, palpitations and leg swelling.   Gastrointestinal:  Negative for blood in stool, constipation, diarrhea, nausea and vomiting.   Genitourinary:  Negative for dysuria, frequency and hematuria.   Psychiatric/Behavioral:  The patient is not nervous/anxious.          Objective:   /70   Pulse (!) 42   Temp 97.9 °F (36.6 °C) (Oral)   Ht 5' 6" (1.676 m)   Wt 79.5 kg (175 lb 4.8 oz)   SpO2 98%   BMI 28.29 kg/m²     Physical Exam  Constitutional:       Appearance: She is well-developed.   Neck:      Thyroid: No thyroid mass.      Vascular: No carotid bruit.   Cardiovascular:      Rate and Rhythm: Normal rate and regular rhythm.      Heart sounds: No murmur heard.     No friction rub. No gallop.   Pulmonary:      Effort: Pulmonary effort is normal.      Breath sounds: Normal breath sounds. No wheezing or rales.   Abdominal:      General: Bowel sounds are normal.      Palpations: Abdomen is soft. There is no mass.      Tenderness: There is no abdominal tenderness.   Musculoskeletal:      Cervical back: Neck supple.   Lymphadenopathy:      Cervical: No cervical adenopathy.   Neurological:      Mental Status: She is alert and oriented to person, place, and time.          Latest Reference Range & Units 03/09/23 16:16 08/04/23 10:07   ALT 10 - 44 U/L 18 14 "   Cholesterol 120 - 199 mg/dL 207 (H) 144   HDL 40 - 75 mg/dL 47 48   HDL/Cholesterol Ratio 20.0 - 50.0 % 22.7 33.3   LDL Cholesterol External 63.0 - 159.0 mg/dL 123.4 73.2   Non-HDL Cholesterol mg/dL 160 96   Total Cholesterol/HDL Ratio 2.0 - 5.0  4.4 3.0   Triglycerides 30 - 150 mg/dL 183 (H) 114       Assessment:       1. Bradycardia    2. Essential hypertension    3. Other hyperlipidemia          Plan:     Bradycardia  -     IN OFFICE EKG 12-LEAD (to [place])  -     Ambulatory referral/consult to Cardiology; Future; Expected date: 08/18/2023    Essential hypertension- controlled on rx, cont.    Other hyperlipidemia- better on this dose, cont.    RTC 1 mo.

## 2023-08-04 ENCOUNTER — CLINICAL SUPPORT (OUTPATIENT)
Dept: PRIMARY CARE CLINIC | Facility: CLINIC | Age: 78
End: 2023-08-04
Payer: MEDICARE

## 2023-08-04 DIAGNOSIS — E78.49 OTHER HYPERLIPIDEMIA: ICD-10-CM

## 2023-08-04 LAB
ALT SERPL W/O P-5'-P-CCNC: 14 U/L (ref 10–44)
CHOLEST SERPL-MCNC: 144 MG/DL (ref 120–199)
CHOLEST/HDLC SERPL: 3 {RATIO} (ref 2–5)
HDLC SERPL-MCNC: 48 MG/DL (ref 40–75)
HDLC SERPL: 33.3 % (ref 20–50)
LDLC SERPL CALC-MCNC: 73.2 MG/DL (ref 63–159)
NONHDLC SERPL-MCNC: 96 MG/DL
TRIGL SERPL-MCNC: 114 MG/DL (ref 30–150)

## 2023-08-04 PROCEDURE — 80061 LIPID PANEL: CPT | Performed by: INTERNAL MEDICINE

## 2023-08-04 PROCEDURE — 84460 ALANINE AMINO (ALT) (SGPT): CPT | Performed by: INTERNAL MEDICINE

## 2023-08-07 NOTE — PROGRESS NOTES
How did pt stick to exercise plan:  5 min cardio on bike  Sit to stands with 4 lb weight ball 2x10  Standing marches against wall 2x20  Seated leg extensions 3x20  Forward kicks 10x3  Backward kicks 10x3  Side kicks 10x3  Heel raises 3x20  Squats 30

## 2023-08-11 ENCOUNTER — OFFICE VISIT (OUTPATIENT)
Dept: PRIMARY CARE CLINIC | Facility: CLINIC | Age: 78
End: 2023-08-11
Payer: MEDICARE

## 2023-08-11 ENCOUNTER — DOCUMENTATION ONLY (OUTPATIENT)
Dept: REHABILITATION | Facility: HOSPITAL | Age: 78
End: 2023-08-11
Payer: MEDICARE

## 2023-08-11 VITALS
DIASTOLIC BLOOD PRESSURE: 70 MMHG | HEART RATE: 42 BPM | BODY MASS INDEX: 28.17 KG/M2 | OXYGEN SATURATION: 98 % | TEMPERATURE: 98 F | HEIGHT: 66 IN | SYSTOLIC BLOOD PRESSURE: 135 MMHG | WEIGHT: 175.31 LBS

## 2023-08-11 DIAGNOSIS — I10 ESSENTIAL HYPERTENSION: ICD-10-CM

## 2023-08-11 DIAGNOSIS — E78.49 OTHER HYPERLIPIDEMIA: ICD-10-CM

## 2023-08-11 DIAGNOSIS — R00.1 BRADYCARDIA: Primary | ICD-10-CM

## 2023-08-11 PROCEDURE — 93005 EKG 12-LEAD: ICD-10-PCS | Mod: S$GLB,,, | Performed by: INTERNAL MEDICINE

## 2023-08-11 PROCEDURE — 99214 PR OFFICE/OUTPT VISIT, EST, LEVL IV, 30-39 MIN: ICD-10-PCS | Mod: S$GLB,,, | Performed by: INTERNAL MEDICINE

## 2023-08-11 PROCEDURE — 99999 PR PBB SHADOW E&M-EST. PATIENT-LVL III: CPT | Mod: PBBFAC,,, | Performed by: INTERNAL MEDICINE

## 2023-08-11 PROCEDURE — 1126F PR PAIN SEVERITY QUANTIFIED, NO PAIN PRESENT: ICD-10-PCS | Mod: CPTII,S$GLB,, | Performed by: INTERNAL MEDICINE

## 2023-08-11 PROCEDURE — 1101F PT FALLS ASSESS-DOCD LE1/YR: CPT | Mod: CPTII,S$GLB,, | Performed by: INTERNAL MEDICINE

## 2023-08-11 PROCEDURE — 3075F PR MOST RECENT SYSTOLIC BLOOD PRESS GE 130-139MM HG: ICD-10-PCS | Mod: CPTII,S$GLB,, | Performed by: INTERNAL MEDICINE

## 2023-08-11 PROCEDURE — 3288F PR FALLS RISK ASSESSMENT DOCUMENTED: ICD-10-PCS | Mod: CPTII,S$GLB,, | Performed by: INTERNAL MEDICINE

## 2023-08-11 PROCEDURE — 3078F DIAST BP <80 MM HG: CPT | Mod: CPTII,S$GLB,, | Performed by: INTERNAL MEDICINE

## 2023-08-11 PROCEDURE — 3075F SYST BP GE 130 - 139MM HG: CPT | Mod: CPTII,S$GLB,, | Performed by: INTERNAL MEDICINE

## 2023-08-11 PROCEDURE — 1159F PR MEDICATION LIST DOCUMENTED IN MEDICAL RECORD: ICD-10-PCS | Mod: CPTII,S$GLB,, | Performed by: INTERNAL MEDICINE

## 2023-08-11 PROCEDURE — 93005 ELECTROCARDIOGRAM TRACING: CPT | Mod: S$GLB,,, | Performed by: INTERNAL MEDICINE

## 2023-08-11 PROCEDURE — 93010 EKG 12-LEAD: ICD-10-PCS | Mod: S$GLB,,, | Performed by: INTERNAL MEDICINE

## 2023-08-11 PROCEDURE — 99214 OFFICE O/P EST MOD 30 MIN: CPT | Mod: S$GLB,,, | Performed by: INTERNAL MEDICINE

## 2023-08-11 PROCEDURE — 1126F AMNT PAIN NOTED NONE PRSNT: CPT | Mod: CPTII,S$GLB,, | Performed by: INTERNAL MEDICINE

## 2023-08-11 PROCEDURE — 1159F MED LIST DOCD IN RCRD: CPT | Mod: CPTII,S$GLB,, | Performed by: INTERNAL MEDICINE

## 2023-08-11 PROCEDURE — 1101F PR PT FALLS ASSESS DOC 0-1 FALLS W/OUT INJ PAST YR: ICD-10-PCS | Mod: CPTII,S$GLB,, | Performed by: INTERNAL MEDICINE

## 2023-08-11 PROCEDURE — 3078F PR MOST RECENT DIASTOLIC BLOOD PRESSURE < 80 MM HG: ICD-10-PCS | Mod: CPTII,S$GLB,, | Performed by: INTERNAL MEDICINE

## 2023-08-11 PROCEDURE — 93010 ELECTROCARDIOGRAM REPORT: CPT | Mod: S$GLB,,, | Performed by: INTERNAL MEDICINE

## 2023-08-11 PROCEDURE — 99999 PR PBB SHADOW E&M-EST. PATIENT-LVL III: ICD-10-PCS | Mod: PBBFAC,,, | Performed by: INTERNAL MEDICINE

## 2023-08-11 PROCEDURE — 3288F FALL RISK ASSESSMENT DOCD: CPT | Mod: CPTII,S$GLB,, | Performed by: INTERNAL MEDICINE

## 2023-08-15 ENCOUNTER — OFFICE VISIT (OUTPATIENT)
Dept: CARDIOLOGY | Facility: CLINIC | Age: 78
End: 2023-08-15
Payer: MEDICARE

## 2023-08-15 VITALS
BODY MASS INDEX: 28.21 KG/M2 | HEIGHT: 66 IN | OXYGEN SATURATION: 96 % | SYSTOLIC BLOOD PRESSURE: 128 MMHG | WEIGHT: 175.5 LBS | DIASTOLIC BLOOD PRESSURE: 68 MMHG | HEART RATE: 73 BPM

## 2023-08-15 DIAGNOSIS — R26.89 IMBALANCE: ICD-10-CM

## 2023-08-15 DIAGNOSIS — E78.5 HYPERLIPIDEMIA, UNSPECIFIED HYPERLIPIDEMIA TYPE: Primary | ICD-10-CM

## 2023-08-15 DIAGNOSIS — R00.1 BRADYCARDIA: ICD-10-CM

## 2023-08-15 DIAGNOSIS — Z76.89 ENCOUNTER TO ESTABLISH CARE: ICD-10-CM

## 2023-08-15 DIAGNOSIS — I10 ESSENTIAL HYPERTENSION: ICD-10-CM

## 2023-08-15 DIAGNOSIS — I10 ESSENTIAL HYPERTENSION: Primary | ICD-10-CM

## 2023-08-15 PROCEDURE — 3078F PR MOST RECENT DIASTOLIC BLOOD PRESSURE < 80 MM HG: ICD-10-PCS | Mod: CPTII,S$GLB,, | Performed by: INTERNAL MEDICINE

## 2023-08-15 PROCEDURE — 1126F AMNT PAIN NOTED NONE PRSNT: CPT | Mod: CPTII,S$GLB,, | Performed by: INTERNAL MEDICINE

## 2023-08-15 PROCEDURE — 1159F PR MEDICATION LIST DOCUMENTED IN MEDICAL RECORD: ICD-10-PCS | Mod: CPTII,S$GLB,, | Performed by: INTERNAL MEDICINE

## 2023-08-15 PROCEDURE — 99204 OFFICE O/P NEW MOD 45 MIN: CPT | Mod: S$GLB,,, | Performed by: INTERNAL MEDICINE

## 2023-08-15 PROCEDURE — 3074F SYST BP LT 130 MM HG: CPT | Mod: CPTII,S$GLB,, | Performed by: INTERNAL MEDICINE

## 2023-08-15 PROCEDURE — 1101F PT FALLS ASSESS-DOCD LE1/YR: CPT | Mod: CPTII,S$GLB,, | Performed by: INTERNAL MEDICINE

## 2023-08-15 PROCEDURE — 3288F PR FALLS RISK ASSESSMENT DOCUMENTED: ICD-10-PCS | Mod: CPTII,S$GLB,, | Performed by: INTERNAL MEDICINE

## 2023-08-15 PROCEDURE — 1101F PR PT FALLS ASSESS DOC 0-1 FALLS W/OUT INJ PAST YR: ICD-10-PCS | Mod: CPTII,S$GLB,, | Performed by: INTERNAL MEDICINE

## 2023-08-15 PROCEDURE — 99999 PR PBB SHADOW E&M-EST. PATIENT-LVL III: CPT | Mod: PBBFAC,,, | Performed by: INTERNAL MEDICINE

## 2023-08-15 PROCEDURE — 99999 PR PBB SHADOW E&M-EST. PATIENT-LVL III: ICD-10-PCS | Mod: PBBFAC,,, | Performed by: INTERNAL MEDICINE

## 2023-08-15 PROCEDURE — 1126F PR PAIN SEVERITY QUANTIFIED, NO PAIN PRESENT: ICD-10-PCS | Mod: CPTII,S$GLB,, | Performed by: INTERNAL MEDICINE

## 2023-08-15 PROCEDURE — 1159F MED LIST DOCD IN RCRD: CPT | Mod: CPTII,S$GLB,, | Performed by: INTERNAL MEDICINE

## 2023-08-15 PROCEDURE — 3074F PR MOST RECENT SYSTOLIC BLOOD PRESSURE < 130 MM HG: ICD-10-PCS | Mod: CPTII,S$GLB,, | Performed by: INTERNAL MEDICINE

## 2023-08-15 PROCEDURE — 99204 PR OFFICE/OUTPT VISIT, NEW, LEVL IV, 45-59 MIN: ICD-10-PCS | Mod: S$GLB,,, | Performed by: INTERNAL MEDICINE

## 2023-08-15 PROCEDURE — 3078F DIAST BP <80 MM HG: CPT | Mod: CPTII,S$GLB,, | Performed by: INTERNAL MEDICINE

## 2023-08-15 PROCEDURE — 3288F FALL RISK ASSESSMENT DOCD: CPT | Mod: CPTII,S$GLB,, | Performed by: INTERNAL MEDICINE

## 2023-08-15 RX ORDER — CHOLECALCIFEROL (VITAMIN D3) 25 MCG
1000 TABLET ORAL DAILY
COMMUNITY

## 2023-08-15 RX ORDER — LANOLIN ALCOHOL/MO/W.PET/CERES
400 CREAM (GRAM) TOPICAL DAILY
COMMUNITY

## 2023-08-15 RX ORDER — OMEPRAZOLE 20 MG/1
20 CAPSULE, DELAYED RELEASE ORAL DAILY
COMMUNITY

## 2023-08-15 RX ORDER — PRAVASTATIN SODIUM 20 MG/1
20 TABLET ORAL DAILY
Qty: 30 TABLET | Refills: 11 | Status: SHIPPED | OUTPATIENT
Start: 2023-08-15 | End: 2023-10-24

## 2023-08-15 NOTE — PROGRESS NOTES
Subjective:   Patient ID:  Dalia Peña is a 78 y.o. female who presents for evaluation of No chief complaint on file.      HPI  78-year-old female, with past medical history listed below.    Recently started on atorvastatin.  States that since then she is been feeling wobbly sometimes all of the sudden.    She was also noted to have bradycardia on her doctor's visit with a heart rate in the 40s however her EKG showed normal sinus rhythm in the 60s.    On exam she does have some PACs.    She denies any syncope or presyncope.    She states that she will follow with Dr. Murguia about her neuro complaints.    She wants to come off the atorvastatin as she claims to be the cause of her symptoms.  Denies any chest pain or dyspnea on exertion.    I reviewed her heart rate to her I watch recording which ranged between 40 and 110.  Some of them in the morning between 8 and 9:00 a.m..    Past Medical History:   Diagnosis Date    Essential (primary) hypertension     Hyperlipemia        Past Surgical History:   Procedure Laterality Date    CATARACT EXTRACTION Right     none         Social History     Tobacco Use    Smoking status: Never    Smokeless tobacco: Never       History reviewed. No pertinent family history.    Review of Systems   Cardiovascular:  Negative for chest pain, dyspnea on exertion, palpitations and syncope.   Genitourinary: Negative.    Neurological: Negative.        Current Outpatient Medications on File Prior to Visit   Medication Sig    amLODIPine (NORVASC) 5 MG tablet TAKE ONE TABLET BY MOUTH EVERY DAY    BOOSTRIX TDAP 2.5-8-5 Lf-mcg-Lf/0.5mL Syrg injection     busPIRone (BUSPAR) 5 MG Tab Take 1 tablet (5 mg total) by mouth 2 (two) times daily as needed (anxiety).    magnesium oxide (MAG-OX) 400 mg (241.3 mg magnesium) tablet Take 400 mg by mouth once daily.    omeprazole (PRILOSEC) 20 MG capsule Take 20 mg by mouth once daily.    vitamin D (VITAMIN D3) 1000 units Tab Take 1,000 Units by mouth  once daily.    [DISCONTINUED] atorvastatin (LIPITOR) 10 MG tablet Take 1 tablet (10 mg total) by mouth once daily.     No current facility-administered medications on file prior to visit.       Objective:   Objective:  Wt Readings from Last 3 Encounters:   08/15/23 79.6 kg (175 lb 7.8 oz)   08/11/23 79.5 kg (175 lb 4.8 oz)   06/26/23 78.9 kg (174 lb)     Temp Readings from Last 3 Encounters:   08/11/23 97.9 °F (36.6 °C) (Oral)   06/26/23 97.7 °F (36.5 °C) (Oral)   06/19/23 97.7 °F (36.5 °C) (Oral)     BP Readings from Last 3 Encounters:   08/15/23 128/68   08/11/23 135/70   06/26/23 (!) 142/68     Pulse Readings from Last 3 Encounters:   08/15/23 73   08/11/23 (!) 42   06/26/23 (!) 46       Physical Exam  Vitals reviewed.   Constitutional:       Appearance: She is well-developed.   Neck:      Vascular: No carotid bruit.   Cardiovascular:      Rate and Rhythm: Normal rate and regular rhythm.      Pulses: Intact distal pulses.      Heart sounds: Normal heart sounds. No murmur heard.  Pulmonary:      Breath sounds: Normal breath sounds.   Neurological:      Mental Status: She is oriented to person, place, and time.       Lab Results   Component Value Date    CHOL 144 08/04/2023    CHOL 207 (H) 03/09/2023    CHOL 215 (H) 02/24/2022     Lab Results   Component Value Date    HDL 48 08/04/2023    HDL 47 03/09/2023    HDL 52 02/24/2022     Lab Results   Component Value Date    LDLCALC 73.2 08/04/2023    LDLCALC 123.4 03/09/2023    LDLCALC 138.2 02/24/2022     Lab Results   Component Value Date    TRIG 114 08/04/2023    TRIG 183 (H) 03/09/2023    TRIG 124 02/24/2022     Lab Results   Component Value Date    CHOLHDL 33.3 08/04/2023    CHOLHDL 22.7 03/09/2023    CHOLHDL 24.2 02/24/2022       Chemistry        Component Value Date/Time     03/09/2023 1616    K 4.4 03/09/2023 1616     03/09/2023 1616    CO2 27 03/09/2023 1616    BUN 22 03/09/2023 1616    CREATININE 0.8 03/09/2023 1616     03/09/2023 1616       "  Component Value Date/Time    CALCIUM 10.0 03/09/2023 1616    ALKPHOS 86 03/09/2023 1616    AST 19 03/09/2023 1616    ALT 14 08/04/2023 1007    BILITOT 0.3 03/09/2023 1616    ESTGFRAFRICA >60.0 02/24/2022 0900    EGFRNONAA >60.0 02/24/2022 0900          Lab Results   Component Value Date    TSH 0.883 03/09/2023     No results found for: "INR", "PROTIME"  Lab Results   Component Value Date    WBC 6.90 03/09/2023    HGB 12.9 03/09/2023    HCT 40.6 03/09/2023    MCV 97 03/09/2023     03/09/2023     BNP  @LABRCNTIP(BNP,BNPTRIAGEBLO)@  CrCl cannot be calculated (Patient's most recent lab result is older than the maximum 7 days allowed.).     Imaging:  ======    No results found for this or any previous visit.    No results found for this or any previous visit.    Results for orders placed during the hospital encounter of 02/24/22    X-Ray Chest PA And Lateral    Narrative  EXAMINATION:  XR CHEST PA AND LATERAL    CLINICAL HISTORY:  Essential (primary) hypertension    TECHNIQUE:  PA and lateral views of the chest were performed.    COMPARISON:  None    FINDINGS:  Clear lungs.  No effusion.  Descending thoracic aorta is slightly tortuous.  Cardiomediastinal silhouette is not enlarged.  Degenerative changes of the spine are noted    Impression  No acute findings.      Electronically signed by: Primo Hernandez MD  Date:    02/24/2022  Time:    09:54    No results found for this or any previous visit.    No valid procedures specified.    No results found for this or any previous visit.      No results found for this or any previous visit.      No results found for this or any previous visit.      Diagnostic Results:  ECG: Reviewed    The 10-year ASCVD risk score (Coby BAKER, et al., 2019) is: 28%    Values used to calculate the score:      Age: 78 years      Sex: Female      Is Non- : No      Diabetic: No      Tobacco smoker: No      Systolic Blood Pressure: 128 mmHg      Is BP treated: Yes      " HDL Cholesterol: 48 mg/dL      Total Cholesterol: 144 mg/dL        Assessment and Plan:   Hyperlipidemia, unspecified hyperlipidemia type  -     pravastatin (PRAVACHOL) 20 MG tablet; Take 1 tablet (20 mg total) by mouth once daily.  Dispense: 30 tablet; Refill: 11    Bradycardia  -     Ambulatory referral/consult to Cardiology  -     Cardiac Monitor - 3-15 Day Adult (Cupid Only); Future    Imbalance    Essential hypertension        Reviewed all tests and above medical conditions with patient in detail and formulated treatment plan.  Risk factor modification discussed.   Cardiac low salt diet discussed.  Maintaining healthy weight and weight loss goals were discussed in clinic.  Switch Lipitor to pravastatin for possible better tolerance.    We will get a cardiac monitor for 2 weeks.  Follow up in  3 months

## 2023-08-25 ENCOUNTER — HOSPITAL ENCOUNTER (OUTPATIENT)
Dept: CARDIOLOGY | Facility: HOSPITAL | Age: 78
Discharge: HOME | End: 2023-08-25
Attending: INTERNAL MEDICINE
Payer: MEDICARE

## 2023-08-25 DIAGNOSIS — R00.1 BRADYCARDIA: ICD-10-CM

## 2023-08-25 PROCEDURE — 93248 EXT ECG>7D<15D REV&INTERPJ: CPT | Mod: ,,, | Performed by: INTERNAL MEDICINE

## 2023-08-25 PROCEDURE — 93248 CV CARDIAC MONITOR - 3-15 DAY ADULT (CUPID ONLY): ICD-10-PCS | Mod: ,,, | Performed by: INTERNAL MEDICINE

## 2023-09-12 ENCOUNTER — OFFICE VISIT (OUTPATIENT)
Dept: PRIMARY CARE CLINIC | Facility: CLINIC | Age: 78
End: 2023-09-12
Payer: MEDICARE

## 2023-09-12 VITALS
HEART RATE: 62 BPM | SYSTOLIC BLOOD PRESSURE: 130 MMHG | BODY MASS INDEX: 28.02 KG/M2 | TEMPERATURE: 98 F | WEIGHT: 173.63 LBS | OXYGEN SATURATION: 98 % | DIASTOLIC BLOOD PRESSURE: 68 MMHG

## 2023-09-12 DIAGNOSIS — G47.19 EXCESSIVE DAYTIME SLEEPINESS: Primary | ICD-10-CM

## 2023-09-12 DIAGNOSIS — Z63.6 CAREGIVER STRESS: ICD-10-CM

## 2023-09-12 LAB
OHS CV HOLTER SINUS AVERAGE HR: 84
OHS CV HOLTER SINUS MAX HR: 131
OHS CV HOLTER SINUS MIN HR: 55

## 2023-09-12 PROCEDURE — 99999 PR PBB SHADOW E&M-EST. PATIENT-LVL V: CPT | Mod: PBBFAC,,, | Performed by: FAMILY MEDICINE

## 2023-09-12 PROCEDURE — 1125F PR PAIN SEVERITY QUANTIFIED, PAIN PRESENT: ICD-10-PCS | Mod: CPTII,S$GLB,, | Performed by: FAMILY MEDICINE

## 2023-09-12 PROCEDURE — 3075F PR MOST RECENT SYSTOLIC BLOOD PRESS GE 130-139MM HG: ICD-10-PCS | Mod: CPTII,S$GLB,, | Performed by: FAMILY MEDICINE

## 2023-09-12 PROCEDURE — 1159F PR MEDICATION LIST DOCUMENTED IN MEDICAL RECORD: ICD-10-PCS | Mod: CPTII,S$GLB,, | Performed by: FAMILY MEDICINE

## 2023-09-12 PROCEDURE — 3075F SYST BP GE 130 - 139MM HG: CPT | Mod: CPTII,S$GLB,, | Performed by: FAMILY MEDICINE

## 2023-09-12 PROCEDURE — 3288F FALL RISK ASSESSMENT DOCD: CPT | Mod: CPTII,S$GLB,, | Performed by: FAMILY MEDICINE

## 2023-09-12 PROCEDURE — 1160F PR REVIEW ALL MEDS BY PRESCRIBER/CLIN PHARMACIST DOCUMENTED: ICD-10-PCS | Mod: CPTII,S$GLB,, | Performed by: FAMILY MEDICINE

## 2023-09-12 PROCEDURE — 1101F PR PT FALLS ASSESS DOC 0-1 FALLS W/OUT INJ PAST YR: ICD-10-PCS | Mod: CPTII,S$GLB,, | Performed by: FAMILY MEDICINE

## 2023-09-12 PROCEDURE — 99999 PR PBB SHADOW E&M-EST. PATIENT-LVL V: ICD-10-PCS | Mod: PBBFAC,,, | Performed by: FAMILY MEDICINE

## 2023-09-12 PROCEDURE — 99214 PR OFFICE/OUTPT VISIT, EST, LEVL IV, 30-39 MIN: ICD-10-PCS | Mod: S$GLB,,, | Performed by: FAMILY MEDICINE

## 2023-09-12 PROCEDURE — 1159F MED LIST DOCD IN RCRD: CPT | Mod: CPTII,S$GLB,, | Performed by: FAMILY MEDICINE

## 2023-09-12 PROCEDURE — 3288F PR FALLS RISK ASSESSMENT DOCUMENTED: ICD-10-PCS | Mod: CPTII,S$GLB,, | Performed by: FAMILY MEDICINE

## 2023-09-12 PROCEDURE — 3078F PR MOST RECENT DIASTOLIC BLOOD PRESSURE < 80 MM HG: ICD-10-PCS | Mod: CPTII,S$GLB,, | Performed by: FAMILY MEDICINE

## 2023-09-12 PROCEDURE — 1160F RVW MEDS BY RX/DR IN RCRD: CPT | Mod: CPTII,S$GLB,, | Performed by: FAMILY MEDICINE

## 2023-09-12 PROCEDURE — 1125F AMNT PAIN NOTED PAIN PRSNT: CPT | Mod: CPTII,S$GLB,, | Performed by: FAMILY MEDICINE

## 2023-09-12 PROCEDURE — 3078F DIAST BP <80 MM HG: CPT | Mod: CPTII,S$GLB,, | Performed by: FAMILY MEDICINE

## 2023-09-12 PROCEDURE — 1101F PT FALLS ASSESS-DOCD LE1/YR: CPT | Mod: CPTII,S$GLB,, | Performed by: FAMILY MEDICINE

## 2023-09-12 PROCEDURE — 99214 OFFICE O/P EST MOD 30 MIN: CPT | Mod: S$GLB,,, | Performed by: FAMILY MEDICINE

## 2023-09-12 SDOH — SOCIAL DETERMINANTS OF HEALTH (SDOH): DEPENDENT RELATIVE NEEDING CARE AT HOME: Z63.6

## 2023-09-12 NOTE — PROGRESS NOTES
Subjective:       Patient ID: Dalia Peña is a 78 y.o. female.    Chief Complaint: Follow-up    HPI:     Here for follow up of bradycardia. Has seen Dr. Bui and is waiting on results of Holter monitor which she will discuss with him at upcoming visit. BP at home 130-140/70 range. HR slightly higher lately at home (50s per apple watch). Her son works in sleep medicine and is concerned she may have JESUS. +snoring and daytime fatigue but stays very active. Raising her grandson who is almost 12 and is hoping to remain in good enough health to get him through school. 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     Updated/ annual due 3/24:  HM:  11/22 fluvax, 5/21 covid vaccines, 2/17 [id number], 11/18 [id number], 3/23 TDaP, 2022 Shingrix x2, 5/23 MMG, 9/22 BMD rep 2y, Cscope at 73yo rep 10y.    Objective:     Vitals:    09/12/23 1015   BP: 130/68   Pulse: 62   Temp: 97.8 °F (36.6 °C)        Physical Exam  Vitals and nursing note reviewed.   Constitutional:       Appearance: She is well-developed.   HENT:      Head: Normocephalic and atraumatic.   Eyes:      Pupils: Pupils are equal, round, and reactive to light.   Cardiovascular:      Rate and Rhythm: Normal rate and regular rhythm.   Pulmonary:      Effort: Pulmonary effort is normal.      Breath sounds: Normal breath sounds.   Musculoskeletal:         General: No deformity. Normal range of motion.      Cervical back: Normal range of motion and neck supple.   Skin:     General: Skin is warm and dry.   Neurological:      Mental Status: She is alert and oriented to person, place, and time.   Psychiatric:         Behavior: Behavior normal.           Albumin   Date Value Ref Range Status   03/09/2023 3.9 3.5 - 5.2 g/dL Final     eGFR   Date Value Ref Range Status   03/09/2023 >60.0 >60 mL/min/1.73 m^2 Final       Assessment:       1. Excessive daytime sleepiness    2. Caregiver stress        Plan:           Problem List Items  Addressed This Visit          Psychiatric    Caregiver stress    Overview     Offered referral to LCSW; she will let us know          Other Visit Diagnoses       Excessive daytime sleepiness    -  Primary    Relevant Orders    Ambulatory referral/consult to Pulmonology        6 weeks

## 2023-09-12 NOTE — PATIENT INSTRUCTIONS
If you are feeling unwell, we'd like to be the first ones to know here at [facility]! Please give us a call. Same day appointments are our top priority to keep you well and out of the emergency rooms and hospitals. Call [phone] for our direct line. After hours advice is always available. Please call [phone] after hours to speak to the on-call team.     Flu and COVID booster are due

## 2023-09-18 PROBLEM — Z00.00 ENCOUNTER FOR PREVENTIVE HEALTH EXAMINATION: Status: RESOLVED | Noted: 2023-06-19 | Resolved: 2023-09-18

## 2023-09-19 ENCOUNTER — PATIENT MESSAGE (OUTPATIENT)
Dept: CARDIOLOGY | Facility: CLINIC | Age: 78
End: 2023-09-19
Payer: MEDICARE

## 2023-09-25 ENCOUNTER — PATIENT MESSAGE (OUTPATIENT)
Dept: PRIMARY CARE CLINIC | Facility: CLINIC | Age: 78
End: 2023-09-25
Payer: MEDICARE

## 2023-10-16 RX ORDER — BUSPIRONE HYDROCHLORIDE 5 MG/1
5 TABLET ORAL 2 TIMES DAILY
Qty: 60 TABLET | Refills: 0 | Status: SHIPPED | OUTPATIENT
Start: 2023-10-16 | End: 2023-11-27

## 2023-10-24 ENCOUNTER — OFFICE VISIT (OUTPATIENT)
Dept: PRIMARY CARE CLINIC | Facility: CLINIC | Age: 78
End: 2023-10-24
Payer: MEDICARE

## 2023-10-24 VITALS
DIASTOLIC BLOOD PRESSURE: 74 MMHG | HEIGHT: 66 IN | BODY MASS INDEX: 27.8 KG/M2 | WEIGHT: 173 LBS | HEART RATE: 73 BPM | SYSTOLIC BLOOD PRESSURE: 136 MMHG | TEMPERATURE: 98 F | OXYGEN SATURATION: 99 %

## 2023-10-24 DIAGNOSIS — J40 BRONCHITIS: Primary | ICD-10-CM

## 2023-10-24 DIAGNOSIS — E78.49 OTHER HYPERLIPIDEMIA: ICD-10-CM

## 2023-10-24 DIAGNOSIS — F41.8 SITUATIONAL ANXIETY: ICD-10-CM

## 2023-10-24 DIAGNOSIS — I10 ESSENTIAL HYPERTENSION: ICD-10-CM

## 2023-10-24 PROBLEM — Z82.49 FAMILY HISTORY OF CORONARY ARTERY DISEASE: Status: ACTIVE | Noted: 2021-02-22

## 2023-10-24 PROCEDURE — 3078F PR MOST RECENT DIASTOLIC BLOOD PRESSURE < 80 MM HG: ICD-10-PCS | Mod: CPTII,S$GLB,, | Performed by: FAMILY MEDICINE

## 2023-10-24 PROCEDURE — 1160F PR REVIEW ALL MEDS BY PRESCRIBER/CLIN PHARMACIST DOCUMENTED: ICD-10-PCS | Mod: CPTII,S$GLB,, | Performed by: FAMILY MEDICINE

## 2023-10-24 PROCEDURE — 1101F PR PT FALLS ASSESS DOC 0-1 FALLS W/OUT INJ PAST YR: ICD-10-PCS | Mod: CPTII,S$GLB,, | Performed by: FAMILY MEDICINE

## 2023-10-24 PROCEDURE — 3075F PR MOST RECENT SYSTOLIC BLOOD PRESS GE 130-139MM HG: ICD-10-PCS | Mod: CPTII,S$GLB,, | Performed by: FAMILY MEDICINE

## 2023-10-24 PROCEDURE — 3288F FALL RISK ASSESSMENT DOCD: CPT | Mod: CPTII,S$GLB,, | Performed by: FAMILY MEDICINE

## 2023-10-24 PROCEDURE — 99214 OFFICE O/P EST MOD 30 MIN: CPT | Mod: S$GLB,,, | Performed by: FAMILY MEDICINE

## 2023-10-24 PROCEDURE — 3078F DIAST BP <80 MM HG: CPT | Mod: CPTII,S$GLB,, | Performed by: FAMILY MEDICINE

## 2023-10-24 PROCEDURE — 99999 PR PBB SHADOW E&M-EST. PATIENT-LVL III: ICD-10-PCS | Mod: PBBFAC,,, | Performed by: FAMILY MEDICINE

## 2023-10-24 PROCEDURE — 1101F PT FALLS ASSESS-DOCD LE1/YR: CPT | Mod: CPTII,S$GLB,, | Performed by: FAMILY MEDICINE

## 2023-10-24 PROCEDURE — 1159F PR MEDICATION LIST DOCUMENTED IN MEDICAL RECORD: ICD-10-PCS | Mod: CPTII,S$GLB,, | Performed by: FAMILY MEDICINE

## 2023-10-24 PROCEDURE — 3075F SYST BP GE 130 - 139MM HG: CPT | Mod: CPTII,S$GLB,, | Performed by: FAMILY MEDICINE

## 2023-10-24 PROCEDURE — 99999 PR PBB SHADOW E&M-EST. PATIENT-LVL III: CPT | Mod: PBBFAC,,, | Performed by: FAMILY MEDICINE

## 2023-10-24 PROCEDURE — 1160F RVW MEDS BY RX/DR IN RCRD: CPT | Mod: CPTII,S$GLB,, | Performed by: FAMILY MEDICINE

## 2023-10-24 PROCEDURE — 1159F MED LIST DOCD IN RCRD: CPT | Mod: CPTII,S$GLB,, | Performed by: FAMILY MEDICINE

## 2023-10-24 PROCEDURE — 99214 PR OFFICE/OUTPT VISIT, EST, LEVL IV, 30-39 MIN: ICD-10-PCS | Mod: S$GLB,,, | Performed by: FAMILY MEDICINE

## 2023-10-24 PROCEDURE — 3288F PR FALLS RISK ASSESSMENT DOCUMENTED: ICD-10-PCS | Mod: CPTII,S$GLB,, | Performed by: FAMILY MEDICINE

## 2023-10-24 RX ORDER — METHYLPREDNISOLONE 4 MG/1
TABLET ORAL
Qty: 21 EACH | Refills: 0 | Status: SHIPPED | OUTPATIENT
Start: 2023-10-24 | End: 2023-11-14

## 2023-10-24 NOTE — PROGRESS NOTES
Subjective:       Patient ID: Dalia Peña is a 78 y.o. female.    Chief Complaint: Follow-up (Six week follow up. Pt is complaining of sinus issues. Pt stopped taking Pravastatin, causing pt to be fatigued and have heart palpitations. )    HPI:     Here for follow up. Had mild URI and now with 1 week of productive cough and no relief despite OTC medications. Has seen Dr. Bui and will discuss results of Holter monitor at upcoming visit. High PVC burden and some sinus tachy. BP at home 130-140/70 range.     Psychosocial stressors about the same: Raising her grandson who is almost 12 and is hoping to remain in good enough health to get him through school. 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 Doing better with scheduling outing with [employer] and doing things to care for herself.     Updated/ annual due 3/24:  HM:  10/23 fluvax, 5/21 covid vaccines, 2/17 [id number], 11/18 [id number], 3/23 TDaP, 2022 Shingrix x2, 5/23 MMG, 9/22 BMD rep 2y, Cscope at 71yo rep 10y.    Objective:     Vitals:    10/24/23 1104   BP: 136/74   Pulse: 73   Temp: 98 °F (36.7 °C)     Wt Readings from Last 3 Encounters:   10/24/23 78.5 kg (173 lb)   09/12/23 78.7 kg (173 lb 9.6 oz)   08/15/23 79.6 kg (175 lb 7.8 oz)     Temp Readings from Last 3 Encounters:   10/24/23 98 °F (36.7 °C) (Oral)   09/12/23 97.8 °F (36.6 °C)   08/11/23 97.9 °F (36.6 °C) (Oral)     BP Readings from Last 3 Encounters:   10/24/23 136/74   09/12/23 130/68   08/15/23 128/68     Pulse Readings from Last 3 Encounters:   10/24/23 73   09/12/23 62   08/15/23 73          Physical Exam  Vitals and nursing note reviewed.   Constitutional:       Appearance: She is well-developed.   HENT:      Head: Normocephalic and atraumatic.   Eyes:      Pupils: Pupils are equal, round, and reactive to light.   Cardiovascular:      Rate and Rhythm: Normal rate and regular rhythm.   Pulmonary:      Effort: Pulmonary effort is normal.       Breath sounds: Normal breath sounds.   Musculoskeletal:         General: No deformity. Normal range of motion.      Cervical back: Normal range of motion and neck supple.   Skin:     General: Skin is warm and dry.   Neurological:      Mental Status: She is alert and oriented to person, place, and time.   Psychiatric:         Behavior: Behavior normal.           Albumin   Date Value Ref Range Status   03/09/2023 3.9 3.5 - 5.2 g/dL Final     eGFR   Date Value Ref Range Status   03/09/2023 >60.0 >60 mL/min/1.73 m^2 Final       Assessment:       1. Bronchitis    2. Essential hypertension    3. Other hyperlipidemia    4. Situational anxiety        Plan:           Problem List Items Addressed This Visit          Psychiatric    Situational anxiety    Current Assessment & Plan     Improved; doing well with buspar. Discussed starting therapy with LCSW and she will think about it.             Cardiac/Vascular    Essential hypertension    Current Assessment & Plan     controlled         Other hyperlipidemia    Current Assessment & Plan     Lab Results   Component Value Date    LDLCALC 73.2 08/04/2023             Other Visit Diagnoses       Bronchitis    -  Primary    Relevant Medications    methylPREDNISolone (MEDROL DOSEPACK) 4 mg tablet          Schedule with pulmonology for sleep study  2 month f/u

## 2023-10-24 NOTE — PATIENT INSTRUCTIONS
"Check out the Calm chance from the [employer]; I like their "Breathing Exercises" and Movement exercises.       If you are feeling unwell, we'd like to be the first ones to [facility]! Please give us a call. Same day appointments are our top priority to keep you well and out of the emergency rooms and hospitals. Call [phone] for our direct line. After hours advice is always available. Please call [phone] after hours to speak to the on-call team.     "

## 2023-10-25 NOTE — ASSESSMENT & PLAN NOTE
Improved; doing well with buspar. Discussed starting therapy with LCSW and she will think about it.

## 2023-10-26 ENCOUNTER — PATIENT MESSAGE (OUTPATIENT)
Dept: PRIMARY CARE CLINIC | Facility: CLINIC | Age: 78
End: 2023-10-26
Payer: MEDICARE

## 2023-10-26 ENCOUNTER — DOCUMENTATION ONLY (OUTPATIENT)
Dept: PRIMARY CARE CLINIC | Facility: CLINIC | Age: 78
End: 2023-10-26
Payer: MEDICARE

## 2023-10-26 NOTE — PROGRESS NOTES
Spoke with patient by phone today; she is open to scheduling an appointment with LCSW.  Per her request, sent a [employer] message with available dates and times. She will message back to schedule.

## 2023-11-09 ENCOUNTER — CLINICAL SUPPORT (OUTPATIENT)
Dept: PRIMARY CARE CLINIC | Facility: CLINIC | Age: 78
End: 2023-11-09
Payer: MEDICARE

## 2023-11-09 DIAGNOSIS — F41.9 ANXIETY: Primary | ICD-10-CM

## 2023-11-09 DIAGNOSIS — Z63.6 CAREGIVER STRESS: ICD-10-CM

## 2023-11-09 PROCEDURE — 99499 NO LOS: ICD-10-PCS | Mod: S$GLB,,,

## 2023-11-09 PROCEDURE — 99499 UNLISTED E&M SERVICE: CPT | Mod: S$GLB,,,

## 2023-11-09 SDOH — SOCIAL DETERMINANTS OF HEALTH (SDOH): DEPENDENT RELATIVE NEEDING CARE AT HOME: Z63.6

## 2023-11-16 ENCOUNTER — CLINICAL SUPPORT (OUTPATIENT)
Dept: PRIMARY CARE CLINIC | Facility: CLINIC | Age: 78
End: 2023-11-16
Payer: MEDICARE

## 2023-11-16 DIAGNOSIS — F41.9 ANXIETY: Primary | ICD-10-CM

## 2023-11-16 PROCEDURE — 99499 UNLISTED E&M SERVICE: CPT | Mod: S$GLB,,,

## 2023-11-16 PROCEDURE — 99499 NO LOS: ICD-10-PCS | Mod: S$GLB,,,
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
## 2023-11-27 RX ORDER — BUSPIRONE HYDROCHLORIDE 5 MG/1
5 TABLET ORAL 2 TIMES DAILY PRN
Qty: 60 TABLET | Refills: 0 | Status: SHIPPED | OUTPATIENT
Start: 2023-11-27 | End: 2023-12-28

## 2023-11-30 ENCOUNTER — CLINICAL SUPPORT (OUTPATIENT)
Dept: PRIMARY CARE CLINIC | Facility: CLINIC | Age: 78
End: 2023-11-30
Payer: MEDICARE

## 2023-11-30 DIAGNOSIS — F41.9 ANXIETY: Primary | ICD-10-CM

## 2023-11-30 PROCEDURE — 99499 UNLISTED E&M SERVICE: CPT | Mod: S$GLB,,,

## 2023-11-30 PROCEDURE — 99499 NO LOS: ICD-10-PCS | Mod: S$GLB,,,
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
## 2023-12-07 ENCOUNTER — OFFICE VISIT (OUTPATIENT)
Dept: CARDIOLOGY | Facility: CLINIC | Age: 78
End: 2023-12-07
Payer: MEDICARE

## 2023-12-07 VITALS
WEIGHT: 176.38 LBS | HEART RATE: 83 BPM | DIASTOLIC BLOOD PRESSURE: 79 MMHG | BODY MASS INDEX: 28.47 KG/M2 | SYSTOLIC BLOOD PRESSURE: 136 MMHG

## 2023-12-07 DIAGNOSIS — I49.3 PVC (PREMATURE VENTRICULAR CONTRACTION): ICD-10-CM

## 2023-12-07 DIAGNOSIS — I10 ESSENTIAL HYPERTENSION: Primary | ICD-10-CM

## 2023-12-07 DIAGNOSIS — F41.8 SITUATIONAL ANXIETY: ICD-10-CM

## 2023-12-07 DIAGNOSIS — I10 HYPERTENSION, UNSPECIFIED TYPE: ICD-10-CM

## 2023-12-07 DIAGNOSIS — E78.5 HYPERLIPIDEMIA, UNSPECIFIED HYPERLIPIDEMIA TYPE: ICD-10-CM

## 2023-12-07 PROCEDURE — 1159F PR MEDICATION LIST DOCUMENTED IN MEDICAL RECORD: ICD-10-PCS | Mod: CPTII,S$GLB,, | Performed by: INTERNAL MEDICINE

## 2023-12-07 PROCEDURE — 1101F PR PT FALLS ASSESS DOC 0-1 FALLS W/OUT INJ PAST YR: ICD-10-PCS | Mod: CPTII,S$GLB,, | Performed by: INTERNAL MEDICINE

## 2023-12-07 PROCEDURE — 3288F FALL RISK ASSESSMENT DOCD: CPT | Mod: CPTII,S$GLB,, | Performed by: INTERNAL MEDICINE

## 2023-12-07 PROCEDURE — 99214 OFFICE O/P EST MOD 30 MIN: CPT | Mod: S$GLB,,, | Performed by: INTERNAL MEDICINE

## 2023-12-07 PROCEDURE — 1101F PT FALLS ASSESS-DOCD LE1/YR: CPT | Mod: CPTII,S$GLB,, | Performed by: INTERNAL MEDICINE

## 2023-12-07 PROCEDURE — 3078F PR MOST RECENT DIASTOLIC BLOOD PRESSURE < 80 MM HG: ICD-10-PCS | Mod: CPTII,S$GLB,, | Performed by: INTERNAL MEDICINE

## 2023-12-07 PROCEDURE — 3075F SYST BP GE 130 - 139MM HG: CPT | Mod: CPTII,S$GLB,, | Performed by: INTERNAL MEDICINE

## 2023-12-07 PROCEDURE — 3288F PR FALLS RISK ASSESSMENT DOCUMENTED: ICD-10-PCS | Mod: CPTII,S$GLB,, | Performed by: INTERNAL MEDICINE

## 2023-12-07 PROCEDURE — 1159F MED LIST DOCD IN RCRD: CPT | Mod: CPTII,S$GLB,, | Performed by: INTERNAL MEDICINE

## 2023-12-07 PROCEDURE — 3078F DIAST BP <80 MM HG: CPT | Mod: CPTII,S$GLB,, | Performed by: INTERNAL MEDICINE

## 2023-12-07 PROCEDURE — 3075F PR MOST RECENT SYSTOLIC BLOOD PRESS GE 130-139MM HG: ICD-10-PCS | Mod: CPTII,S$GLB,, | Performed by: INTERNAL MEDICINE

## 2023-12-07 PROCEDURE — 99999 PR PBB SHADOW E&M-EST. PATIENT-LVL III: CPT | Mod: PBBFAC,,, | Performed by: INTERNAL MEDICINE

## 2023-12-07 PROCEDURE — 99214 PR OFFICE/OUTPT VISIT, EST, LEVL IV, 30-39 MIN: ICD-10-PCS | Mod: S$GLB,,, | Performed by: INTERNAL MEDICINE

## 2023-12-07 PROCEDURE — 99999 PR PBB SHADOW E&M-EST. PATIENT-LVL III: ICD-10-PCS | Mod: PBBFAC,,, | Performed by: INTERNAL MEDICINE

## 2023-12-07 RX ORDER — METOPROLOL SUCCINATE 25 MG/1
25 TABLET, EXTENDED RELEASE ORAL DAILY
Qty: 30 TABLET | Refills: 11 | Status: SHIPPED | OUTPATIENT
Start: 2023-12-07 | End: 2024-12-06

## 2023-12-07 NOTE — PROGRESS NOTES
Subjective:   Patient ID:  Dalia Peña is a 78 y.o. female who presents for evaluation of No chief complaint on file.      HPI  12.7.2023  Comes in for a six-month follow-up.    She had a event monitor after last visit showed 20% PVC.    She states that she was feeling them at that time however she states that this was due to the atorvastatin as she thinks that once she got off the medication she was feeling better.    She denies any palpitations syncope or presyncope   I felt her pulse for more than 2 minutes today without any syncope PVC.  She states that she has been feeling well lately.    Her blood pressure is slightly elevated and she states that this is same as her blood pressure at home.  She is also little anxious lately about her  getting worked up for possible esophageal tumor.    Reports that she was intolerant to the pravastatin so she stopped it.    8.2023  78-year-old female, with past medical history listed below.    Recently started on atorvastatin.  States that since then she is been feeling wobbly sometimes all of the sudden.    She was also noted to have bradycardia on her doctor's visit with a heart rate in the 40s however her EKG showed normal sinus rhythm in the 60s.    On exam she does have some PACs.    She denies any syncope or presyncope.    She states that she will follow with Dr. Murguia about her neuro complaints.    She wants to come off the atorvastatin as she claims to be the cause of her symptoms.  Denies any chest pain or dyspnea on exertion.    I reviewed her heart rate to her I watch recording which ranged between 40 and 110.  Some of them in the morning between 8 and 9:00 a.m..    Past Medical History:   Diagnosis Date    Essential (primary) hypertension     Hyperlipemia        Past Surgical History:   Procedure Laterality Date    CATARACT EXTRACTION Right     none         Social History     Tobacco Use    Smoking status: Never    Smokeless tobacco: Never        History reviewed. No pertinent family history.    Review of Systems   Cardiovascular:  Negative for chest pain, dyspnea on exertion, palpitations and syncope.   Genitourinary: Negative.    Neurological: Negative.        Current Outpatient Medications on File Prior to Visit   Medication Sig    amLODIPine (NORVASC) 5 MG tablet TAKE ONE TABLET BY MOUTH EVERY DAY    BOOSTRIX TDAP 2.5-8-5 Lf-mcg-Lf/0.5mL Syrg injection     busPIRone (BUSPAR) 5 MG Tab TAKE ONE TABLET BY MOUTH TWICE A DAY AS NEEDED FOR ANXIETY    magnesium oxide (MAG-OX) 400 mg (241.3 mg magnesium) tablet Take 400 mg by mouth once daily.    omeprazole (PRILOSEC) 20 MG capsule Take 20 mg by mouth once daily.    vitamin D (VITAMIN D3) 1000 units Tab Take 1,000 Units by mouth once daily.     No current facility-administered medications on file prior to visit.       Objective:   Objective:  Wt Readings from Last 3 Encounters:   12/07/23 80 kg (176 lb 5.9 oz)   10/24/23 78.5 kg (173 lb)   09/12/23 78.7 kg (173 lb 9.6 oz)     Temp Readings from Last 3 Encounters:   10/24/23 98 °F (36.7 °C) (Oral)   09/12/23 97.8 °F (36.6 °C)   08/11/23 97.9 °F (36.6 °C) (Oral)     BP Readings from Last 3 Encounters:   12/07/23 136/79   10/24/23 136/74   09/12/23 130/68     Pulse Readings from Last 3 Encounters:   12/07/23 83   10/24/23 73   09/12/23 62       Physical Exam  Vitals reviewed.   Constitutional:       Appearance: She is well-developed.   Neck:      Vascular: No carotid bruit.   Cardiovascular:      Rate and Rhythm: Normal rate and regular rhythm.      Pulses: Intact distal pulses.      Heart sounds: Normal heart sounds. No murmur heard.  Pulmonary:      Breath sounds: Normal breath sounds.   Neurological:      Mental Status: She is oriented to person, place, and time.       Lab Results   Component Value Date    CHOL 144 08/04/2023    CHOL 207 (H) 03/09/2023    CHOL 215 (H) 02/24/2022     Lab Results   Component Value Date    HDL 48 08/04/2023    HDL 47  "03/09/2023    HDL 52 02/24/2022     Lab Results   Component Value Date    LDLCALC 73.2 08/04/2023    LDLCALC 123.4 03/09/2023    LDLCALC 138.2 02/24/2022     Lab Results   Component Value Date    TRIG 114 08/04/2023    TRIG 183 (H) 03/09/2023    TRIG 124 02/24/2022     Lab Results   Component Value Date    CHOLHDL 33.3 08/04/2023    CHOLHDL 22.7 03/09/2023    CHOLHDL 24.2 02/24/2022       Chemistry        Component Value Date/Time     03/09/2023 1616    K 4.4 03/09/2023 1616     03/09/2023 1616    CO2 27 03/09/2023 1616    BUN 22 03/09/2023 1616    CREATININE 0.8 03/09/2023 1616     03/09/2023 1616        Component Value Date/Time    CALCIUM 10.0 03/09/2023 1616    ALKPHOS 86 03/09/2023 1616    AST 19 03/09/2023 1616    ALT 14 08/04/2023 1007    BILITOT 0.3 03/09/2023 1616    ESTGFRAFRICA >60.0 02/24/2022 0900    EGFRNONAA >60.0 02/24/2022 0900          Lab Results   Component Value Date    TSH 0.883 03/09/2023     No results found for: "INR", "PROTIME"  Lab Results   Component Value Date    WBC 6.90 03/09/2023    HGB 12.9 03/09/2023    HCT 40.6 03/09/2023    MCV 97 03/09/2023     03/09/2023     BNP  @LABRCNTIP(BNP,BNPTRIAGEBLO)@  CrCl cannot be calculated (Patient's most recent lab result is older than the maximum 7 days allowed.).     Imaging:  ======    No results found for this or any previous visit.    No results found for this or any previous visit.    Results for orders placed during the hospital encounter of 02/24/22    X-Ray Chest PA And Lateral    Narrative  EXAMINATION:  XR CHEST PA AND LATERAL    CLINICAL HISTORY:  Essential (primary) hypertension    TECHNIQUE:  PA and lateral views of the chest were performed.    COMPARISON:  None    FINDINGS:  Clear lungs.  No effusion.  Descending thoracic aorta is slightly tortuous.  Cardiomediastinal silhouette is not enlarged.  Degenerative changes of the spine are noted    Impression  No acute findings.      Electronically signed " by: Primo Hernandez MD  Date:    02/24/2022  Time:    09:54    No results found for this or any previous visit.    No valid procedures specified.    No results found for this or any previous visit.      No results found for this or any previous visit.      No results found for this or any previous visit.      Diagnostic Results:  ECG: Reviewed  Conclusion         The predominant rhythm is sinus.    Patient Reported Event #1 Patient activated. The patient complained of 2 episodes. The symptom(s) included dizziness. The corresponding rhythm to the patient reported event was sinus tachycardia. These symptoms were associated with PVCs    Patient Reported Event #2 Patient activated. The patient complained of 1 episodes. The symptom(s) included shortness of breath. The corresponding rhythm to the patient reported event was sinus tachycardia. These symptoms were associated with PVCs.    Patient Reported Event #3 Patient activated. The patient complained of 1 episodes. The corresponding rhythm to the patient reported event was sinus tachycardia.    There were 70 runs and lasting to 7 beats. The rate varied between and 172 bpm    The patient presented symptoms corresponding with sinus tachycardia     The patient was monitored for a total of 14d 2h, underlying rhythm is Sinus.  The minimum heart rate was 55 bpm; the maximum 131 bpm; the average 84 bpm.  0 % of Atrial fibrillation/Atrial flutter with longest episode of 0 ms.  The total burden of AV Block present was 0 % [Complete Heart Block: 0 %; Advanced (High Grade):  0 %; 2nd Degree, Mobitz II: 0 %; 2nd Degree, Mobitz I: 0 %].  There were 0 pauses, the longest pause was 0 ms at --.  Total count of Ventricular Tachycardia (VT): 0 episode(s). Longest VT: 0 s on --. Fastest VT: -- bpm on  --.  70 supraventricular episodes were found. Longest SVT Episode 7 beats, Fastest  bpm  There were a total of 04188 PVCs with 2 morphologies and 5 couplets. Overall PVC [place] at 3.11  %  There were a total of 0 Other Beats. There were 0 total number of paced beats.  There were a total of [id number] PSVCs with 2 morphologies and 5856 couplets. Overall PSVC [place] at  19.59 %  There is a total of 5 patient events.      The 10-year ASCVD risk score (Coby BAKER, et al., 2019) is: 31%    Values used to calculate the score:      Age: 78 years      Sex: Female      Is Non- : No      Diabetic: No      Tobacco smoker: No      Systolic Blood Pressure: 136 mmHg      Is BP treated: Yes      HDL Cholesterol: 48 mg/dL      Total Cholesterol: 144 mg/dL        Assessment and Plan:       Essential hypertension    Hypertension, unspecified type  -     metoprolol succinate (TOPROL-XL) 25 MG 24 hr tablet; Take 1 tablet (25 mg total) by mouth once daily.  Dispense: 30 tablet; Refill: 11    Hyperlipidemia, unspecified hyperlipidemia type    Situational anxiety    PVC (premature ventricular contraction)  -     metoprolol succinate (TOPROL-XL) 25 MG 24 hr tablet; Take 1 tablet (25 mg total) by mouth once daily.  Dispense: 30 tablet; Refill: 11          Reviewed all tests and above medical conditions with patient in detail and formulated treatment plan.  Risk factor modification discussed.   Cardiac low salt diet discussed.  Maintaining healthy weight and weight loss goals were discussed in clinic.  A low dose of Toprol added today  Follow up in  6 months

## 2023-12-14 ENCOUNTER — PATIENT MESSAGE (OUTPATIENT)
Dept: PRIMARY CARE CLINIC | Facility: CLINIC | Age: 78
End: 2023-12-14
Payer: MEDICARE

## 2023-12-28 RX ORDER — BUSPIRONE HYDROCHLORIDE 5 MG/1
5 TABLET ORAL 2 TIMES DAILY
Qty: 60 TABLET | Refills: 3 | Status: SHIPPED | OUTPATIENT
Start: 2023-12-28

## 2024-01-10 ENCOUNTER — TELEPHONE (OUTPATIENT)
Dept: PRIMARY CARE CLINIC | Facility: CLINIC | Age: 79
End: 2024-01-10
Payer: MEDICARE

## 2024-01-10 ENCOUNTER — CLINICAL SUPPORT (OUTPATIENT)
Dept: PRIMARY CARE CLINIC | Facility: CLINIC | Age: 79
End: 2024-01-10
Payer: MEDICARE

## 2024-01-10 ENCOUNTER — OFFICE VISIT (OUTPATIENT)
Dept: PRIMARY CARE CLINIC | Facility: CLINIC | Age: 79
End: 2024-01-10
Payer: MEDICARE

## 2024-01-10 VITALS
SYSTOLIC BLOOD PRESSURE: 130 MMHG | TEMPERATURE: 98 F | OXYGEN SATURATION: 96 % | DIASTOLIC BLOOD PRESSURE: 72 MMHG | HEART RATE: 70 BPM | BODY MASS INDEX: 28.38 KG/M2 | HEIGHT: 66 IN | WEIGHT: 176.56 LBS

## 2024-01-10 DIAGNOSIS — F41.9 ANXIETY: Primary | ICD-10-CM

## 2024-01-10 DIAGNOSIS — E78.49 OTHER HYPERLIPIDEMIA: Primary | ICD-10-CM

## 2024-01-10 DIAGNOSIS — I10 ESSENTIAL HYPERTENSION: Primary | ICD-10-CM

## 2024-01-10 DIAGNOSIS — I10 ESSENTIAL HYPERTENSION: ICD-10-CM

## 2024-01-10 DIAGNOSIS — I49.3 SYMPTOMATIC PVCS: ICD-10-CM

## 2024-01-10 DIAGNOSIS — Z63.6 CAREGIVER STRESS: ICD-10-CM

## 2024-01-10 PROCEDURE — 99999 PR PBB SHADOW E&M-EST. PATIENT-LVL IV: CPT | Mod: PBBFAC,,, | Performed by: NURSE PRACTITIONER

## 2024-01-10 PROCEDURE — 99499 UNLISTED E&M SERVICE: CPT | Mod: S$GLB,,,

## 2024-01-10 PROCEDURE — 99215 OFFICE O/P EST HI 40 MIN: CPT | Mod: S$GLB,,, | Performed by: NURSE PRACTITIONER

## 2024-01-10 RX ORDER — HYDROCHLOROTHIAZIDE 12.5 MG/1
12.5 TABLET ORAL DAILY
Qty: 90 TABLET | Refills: 3 | Status: SHIPPED | OUTPATIENT
Start: 2024-01-10 | End: 2025-01-09

## 2024-01-10 SDOH — SOCIAL DETERMINANTS OF HEALTH (SDOH): DEPENDENT RELATIVE NEEDING CARE AT HOME: Z63.6

## 2024-01-10 NOTE — PROGRESS NOTES
"Individual Psychotherapy ([facility])  Dalia FRANK Peña,  1/10[birth year]2024  DATE:  1/10/2024  TYPE OF VISIT:  In person  LENGTH OF SESSION: 45    Therapeutic Intervention: Met with patient for individual psychotherapy.    Chief complaint/reason for encounter: anxiety and interpersonal     Session Content/Presenting Problem:    Patient's spouse Satnam has been sick, in ER, lots of specialists and procedures. Had JESUS and A Fib, mobility issues. Has been caring for him for a while. Had a week of fever. PET scan tomorrow.  Possible lymphoma. Worried about him.  He wants her with him all the time.  Says "It's a burden to feel needed by so many."  Neal went to [place]; trip was "spectacular". Really good time.  Good relationship with Uncle Rashaad and his wife.   Holidays are hard, always the anxiety with preparation, etc. Preparing for Neal was easy this year; able to shop with him.   Neal had gotten sick with stomach virus during exam week. Got his exams done; had strep throat. He was sick and did not study for the exams. Does not want help with studying, but he did fine. Neal has been able to walk to bus on his own now. Trying to encourage him to eat variety of foods.   Will be busy with tax returns soon.  Helping her nephew move Marina into assisted living.  Plans to have a meeting with the sisters and her nephew.   Marina's partner of 16 years lives with her. Neither has any money anymore. He has a daughter and other kids.  Sandy is the go to person in the family to take care of all important decisions.  Patient sometimes worries about all of the people that depend on her. She worries especially about Satnam and how she would care for Neal if Satnam ends up hospitalized.  Discussed possible options. Encouraged patient to be proactive in having options before they are needed. She does have a long time , Verito, who is very involved with her family. Neal has even stayed the night at Verito's house. Verito is busy with her own " "family so limited assistance. Encouraged patient to ask Verito if she may have other people that could possibly help.  Their son Rashaad may come home if Satnam has to be hospitalized; Rashaad is currently not working so has some free time.  Patient also mentions Neal's only . Leigh, who could be available if needed. Patient also mentions two neighbors who are helpful.  Limited but good support system.  Discussed patient caring for herself; she says she is good about practicing self care. She has a [employer] puzzle; she is currently reading 2 books.  Gets herself some treats-cherry pies.  "You have to make your own happiness"  After Neal goes to school she enjoys quiet time at home during which she reads and prays.  Encouraged her to nap if needed as she does say she may not be sleeping enough hours at night.    Patient requests a follow up after April 15th due to tax season.     Past Session:   Patient discussed her feelings after last session. She has migdalia in [facility]'s psychiatrist and is okay with his current diagnoses. She spoke to the school about his grades and was gratified to hear that the school is happy with his work and test scores. She spoke to [facility] about school; he made it clear that he is okay with being at [facility] and does not want to change.   Patient feels she and Satnam have been dealing with so many family issues for so long and it is getting harder as they age. Started with their parents becoming ill and dying relatively young.  Then they had the issues with their son, Byron, and his subsequent suicide. Shortly after that, they took over Neal's care and became his parents. She and Satnam have worked very hard to give Neal  a stable loving environment.   Discussed her level of anxiety currently. She is taking the Buspar twice/day right now. She loves reading, puzzles, coloring books. She listened to music while ironing yesterday and found it very uplifting and enjoyable. She says her " "anxiety stems from worry about the distant future ("what's going to happen when..."). Discussed her experience with [place] and working mindfully on staying present and not allowing fear of the the future overwhelm. Patient may start to keep a gratitude journal; says she has one and may find it helpful.  She is hopeful to return to exercise and work with health  soon.  Had bad reaction to statins and has not been exercising as before. Sees cardiologist tomorrow.    She has a goal of clearing out her sister's belongings so she can use a puzzle table she bought for herself.         Current symptoms:  Depression: hopelessness and fatigue.  Anxiety: excessive worrying.  Insomnia: non-restful sleep.  Deneen:  denies.  Psychosis: denies .      Risk parameters:  Patient reports no suicidal ideation  Patient reports no homicidal ideation  Patient reports no self-injurious behavior  Patient reports no violent behavior    MENTAL HEALTH STATUS EXAM  General Appearance:  unremarkable, age appropriate   Speech: normal tone, normal rate, normal pitch, normal volume      Level of Cooperation: cooperative      Thought Processes: normal and logical   Mood: steady      Thought Content: normal, no suicidality, no homicidality, delusions, or paranoia   Affect: congruent and appropriate   Orientation: Oriented x3   Attention Span & Concentration: intact   Fund of General Knowledge: intact and appropriate to age and level of education   Judgment & Insight: good     Language  intact       STRENGTHS AND LIABILITIES: Strength: Patient accepts guidance/feedback, Strength: Patient is expressive/articulate., Strength: Patient is intelligent., Strength: Patient is stable.    IMPRESSION:   My diagnostic impression is Anxiety disorders; anxiety, unspecified [F41.9], as evidenced by patient report of worrying too much, not being able to stop worrying, worrying about many things.     TREATMENT GOALS (per patient):    To manage worry related to " raising their 12 year old grandson, Neal.       Treatment plan:  Target symptoms: anxiety , adjustment  Why chosen therapy is appropriate versus another modality: relevant to diagnosis, patient responds to this modality, evidence based practice  Outcome monitoring methods: self-report, observation, checklist/rating scale  Therapeutic intervention type: insight oriented psychotherapy    Patient's response to intervention:  The patient's response to intervention is accepting.    Progress toward goals and other mental status changes:  The patient's progress toward goals is good.    Plan: Pt plans to continue individual psychotherapy CBT will be utilized in future individual therapy sessions to increase interaction, insight, support, and parent/behavior management.     Return to clinic: 3 months April 23rd

## 2024-01-10 NOTE — ASSESSMENT & PLAN NOTE
Did not tolerate STATIN   Latest Reference Range & Units Most Recent   Cholesterol Total 120 - 199 mg/dL 144  8/4/23 10:07   HDL 40 - 75 mg/dL 48  8/4/23 10:07   HDL/Cholesterol Ratio 20.0 - 50.0 % 33.3  8/4/23 10:07   Non-HDL Cholesterol mg/dL 96  8/4/23 10:07   Total Cholesterol/HDL Ratio 2.0 - 5.0  3.0  8/4/23 10:07   Triglycerides 30 - 150 mg/dL 114  8/4/23 10:07   LDL Cholesterol 63.0 - 159.0 mg/dL 73.2  8/4/23 10:07

## 2024-01-10 NOTE — PROGRESS NOTES
Dalia Peña  01/10/2024  [id number]    Giselle Murguia MD  Patient Care Team:  Giselle Murguia MD as PCP - General (Internal Medicine)      [facility] Primary Care Note      Chief Complaint:  Chief Complaint   Patient presents with    Follow-up     2 month f/u       History of Present Illness:    Ms. Peña returns for f/u of chronic medical problems. Feeling well today.   Stopped the pravastatin in 10/23 - fatigue, joint aches - symptoms have resolved  Went to Cards in early December and found to have large volume of PVCs - started metoprolol XL 25 mg daily - tolerating well and denies further palpitations     Sleep study is scheduled for February  STOP BANG - intermediate risk for JESUS    Recorded blood pressures at home 131 - 136/72 - 74  Pt's weight is stable  Saw LCSW - anxiety stable - taking Buspar bid    No recent falls  Busy period in her daily activities.  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     Denies fever, chills, URI symptoms, chest pain, SOB, palpitations, vomiting, diarrhea, no gross neuro deficits, urinary symptoms        The following were reviewed: Active problem list, medication list, allergies, family history, social history, and Health Maintenance.     History:  Past Medical History:   Diagnosis Date    Essential (primary) hypertension     Hyperlipemia      Past Surgical History:   Procedure Laterality Date    CATARACT EXTRACTION Right     none       No family history on file.  Patient Active Problem List   Diagnosis    Essential hypertension    Other hyperlipidemia    Situational anxiety    Osteopenia with high risk of fracture    Impacted cerumen of right ear    Abnormality of gait and mobility    Dysuria    Caregiver stress    History of nonmelanoma skin cancer    History of dysplastic nevus    Family history of coronary artery disease    Symptomatic PVCs     Review of patient's allergies  indicates:   Allergen Reactions    Statins-hmg-coa reductase inhibitors      Muscle aches and fatigue    Sulfa (sulfonamide antibiotics) Diarrhea     Other reaction(s): GI Intolerance         Medications:  Current Outpatient Medications on File Prior to Visit   Medication Sig Dispense Refill    amLODIPine (NORVASC) 5 MG tablet TAKE ONE TABLET BY MOUTH EVERY DAY 90 tablet 3    BOOSTRIX TDAP 2.5-8-5 Lf-mcg-Lf/0.5mL Syrg injection       busPIRone (BUSPAR) 5 MG Tab TAKE ONE TABLET BY MOUTH TWICE DAILY AS NEEDED FOR ANXIETY 60 tablet 3    magnesium oxide (MAG-OX) 400 mg (241.3 mg magnesium) tablet Take 400 mg by mouth once daily.      metoprolol succinate (TOPROL-XL) 25 MG 24 hr tablet Take 1 tablet (25 mg total) by mouth once daily. 30 tablet 11    omeprazole (PRILOSEC) 20 MG capsule Take 20 mg by mouth once daily.      vitamin D (VITAMIN D3) 1000 units Tab Take 1,000 Units by mouth once daily.       No current facility-administered medications on file prior to visit.       Medications have been reviewed and reconciled with patient at visit today.      Exam:  Vitals:    01/10/24 1308   BP: 130/72   Pulse:    Temp:      Weight: 80.1 kg (176 lb 9.4 oz)   Body mass index is 28.5 kg/m².      BP Readings from Last 3 Encounters:   01/10/24 130/72   12/07/23 136/79   10/24/23 136/74     Wt Readings from Last 3 Encounters:   01/10/24 0937 80.1 kg (176 lb 9.4 oz)   12/07/23 1123 80 kg (176 lb 5.9 oz)   10/24/23 1104 78.5 kg (173 lb)        REVIEW OF SYSTEMS  Review of Systems   Constitutional:  Negative for chills, fever and malaise/fatigue.   HENT:  Negative for congestion, ear pain and sore throat.    Respiratory:  Negative for cough and shortness of breath.    Cardiovascular:  Positive for leg swelling. Negative for chest pain and palpitations.   Gastrointestinal:  Negative for abdominal pain, diarrhea, nausea and vomiting.   Genitourinary:  Negative for dysuria and frequency.   Musculoskeletal:  Negative for back pain and  myalgias.        Hip pain   Neurological:  Negative for dizziness, focal weakness and headaches.   Psychiatric/Behavioral:  Negative for depression. The patient is not nervous/anxious.        Physical Exam  Vitals reviewed.   Constitutional:       General: She is not in acute distress.     Appearance: Normal appearance.   HENT:      Head: Normocephalic and atraumatic.      Nose: Nose normal.      Mouth/Throat:      Mouth: Mucous membranes are moist.   Eyes:      General: No scleral icterus.     Conjunctiva/sclera: Conjunctivae normal.   Cardiovascular:      Rate and Rhythm: Normal rate and regular rhythm.      Heart sounds: No murmur heard.  Pulmonary:      Effort: Pulmonary effort is normal. No respiratory distress.      Breath sounds: Normal breath sounds.   Abdominal:      Palpations: Abdomen is soft. There is no mass.      Tenderness: There is no abdominal tenderness.   Musculoskeletal:         General: No swelling or deformity. Normal range of motion.      Cervical back: Normal range of motion and neck supple.      Right lower leg: No edema.      Left lower leg: No edema.   Lymphadenopathy:      Cervical: No cervical adenopathy.   Skin:     General: Skin is warm and dry.   Neurological:      Mental Status: She is alert and oriented to person, place, and time. Mental status is at baseline.      Motor: No weakness.   Psychiatric:         Mood and Affect: Mood normal.         Thought Content: Thought content normal.         Laboratory Reviewed:     Lab Results   Component Value Date    WBC 6.90 03/09/2023    HGB 12.9 03/09/2023    HCT 40.6 03/09/2023     03/09/2023    CHOL 144 08/04/2023    TRIG 114 08/04/2023    HDL 48 08/04/2023    ALT 14 08/04/2023    AST 19 03/09/2023     03/09/2023    K 4.4 03/09/2023     03/09/2023    CREATININE 0.8 03/09/2023    BUN 22 03/09/2023    CO2 27 03/09/2023    TSH 0.883 03/09/2023       Screening or Prevention Patient's value Goal Complete/Controlled?   HgA1C  "Testing and Control   No results found for: "HGBA1C"   Annually/Less than 8% No   Lipid profile : 08/04/2023 Annually Yes   LDL control Lab Results   Component Value Date    LDLCALC 73.2 08/04/2023    Annually/Less than 100 mg/dl  Yes   Nephropathy screening No results found for: "LABMICR"  Lab Results   Component Value Date    PROTEINUA 1+ (A) 06/26/2023    Annually Yes   Blood pressure BP Readings from Last 1 Encounters:   01/10/24 130/72    Less than 140/90 No   Dilated retinal exam Most Recent Eye Exam Date: Not Found Annually Yes   Foot exam   Most Recent Foot Exam Date: Not Found Annually Yes       Health Maintenance  Health Maintenance Topics with due status: Not Due       Topic Last Completion Date    DEXA Scan 09/23/2022    TETANUS VACCINE 03/10/2023    Lipid Panel 08/04/2023     Health Maintenance Due   Topic Date Due    Hepatitis C Screening  Never done    RSV Vaccine (Age 60+ and Pregnant patients) (1 - 1-dose 60+ series) Never done    COVID-19 Vaccine (3 - 2023-24 season) 09/01/2023       Assessment and Plan:  1. Other hyperlipidemia  Assessment & Plan:  Did not tolerate STATIN   Latest Reference Range & Units Most Recent   Cholesterol Total 120 - 199 mg/dL 144  8/4/23 10:07   HDL 40 - 75 mg/dL 48  8/4/23 10:07   HDL/Cholesterol Ratio 20.0 - 50.0 % 33.3  8/4/23 10:07   Non-HDL Cholesterol mg/dL 96  8/4/23 10:07   Total Cholesterol/HDL Ratio 2.0 - 5.0  3.0  8/4/23 10:07   Triglycerides 30 - 150 mg/dL 114  8/4/23 10:07   LDL Cholesterol 63.0 - 159.0 mg/dL 73.2  8/4/23 10:07         2. Essential hypertension  Assessment & Plan:  HCTZ 12.5 mg daily  F/U with BP in 2 weeks      3. Symptomatic PVCs  Assessment & Plan:  stable  Continue Toprol XL 25 mg daily                   -Patient's lab results were reviewed and discussed with patient  -Treatment options and alternatives were discussed with the patient. Patient expressed understanding. Patient was given the opportunity to ask questions and be an active " participant in their medical care. Patient had no further questions or concerns at this time.         Future Appointments   Date Time Provider Department Center   2/1/2024 10:40 AM [facility]   3/15/2024  9:00 AM Giselle Murguia MD [facility]   4/23/2024  8:30 AM Mindy Street LCSW [facility]   6/17/2024 11:40 AM Vianney Bui MD [facility]   6/21/2024 10:00 AM Santa Betts NP [facility]          After visit summary printed and given to patient upon discharge.  Patient goals and care plan are included in After visit summary.      The following issues were discussed: The primary encounter diagnosis was Other hyperlipidemia. Diagnoses of Essential hypertension and Symptomatic PVCs were also pertinent to this visit.    Health maintenance needs, recent test results and goals of care discussed with pt and questions answered.           JOANIE Galarza, NP-C  [facility] [address] [place] [address]

## 2024-02-01 ENCOUNTER — PATIENT MESSAGE (OUTPATIENT)
Dept: PULMONOLOGY | Facility: CLINIC | Age: 79
End: 2024-02-01

## 2024-02-01 ENCOUNTER — OFFICE VISIT (OUTPATIENT)
Dept: PULMONOLOGY | Facility: CLINIC | Age: 79
End: 2024-02-01
Payer: MEDICARE

## 2024-02-01 VITALS
BODY MASS INDEX: 28.23 KG/M2 | SYSTOLIC BLOOD PRESSURE: 132 MMHG | RESPIRATION RATE: 16 BRPM | HEIGHT: 66 IN | WEIGHT: 175.69 LBS | HEART RATE: 65 BPM | OXYGEN SATURATION: 95 % | DIASTOLIC BLOOD PRESSURE: 72 MMHG

## 2024-02-01 DIAGNOSIS — I10 ESSENTIAL HYPERTENSION: ICD-10-CM

## 2024-02-01 DIAGNOSIS — R40.0 DAYTIME SLEEPINESS: ICD-10-CM

## 2024-02-01 DIAGNOSIS — G47.00 INSOMNIA, UNSPECIFIED TYPE: ICD-10-CM

## 2024-02-01 DIAGNOSIS — F41.8 SITUATIONAL ANXIETY: ICD-10-CM

## 2024-02-01 DIAGNOSIS — G47.30 SLEEP-DISORDERED BREATHING: Primary | ICD-10-CM

## 2024-02-01 PROCEDURE — 99203 OFFICE O/P NEW LOW 30 MIN: CPT | Mod: S$GLB,,, | Performed by: NURSE PRACTITIONER

## 2024-02-01 PROCEDURE — 99999 PR PBB SHADOW E&M-EST. PATIENT-LVL V: CPT | Mod: PBBFAC,,, | Performed by: NURSE PRACTITIONER

## 2024-02-01 NOTE — PROGRESS NOTES
2/16/2024 addendum: patient sent message, she is not able to perform in lab sleep study, she request Home Sleep Study. Order per my chart message.     Subjective:      Patient ID: Dalia Peña is a 78 y.o. female.    Patient Active Problem List   Diagnosis    Essential hypertension    Other hyperlipidemia    Situational anxiety    Osteopenia with high risk of fracture    Impacted cerumen of right ear    Abnormality of gait and mobility    Dysuria    Caregiver stress    History of nonmelanoma skin cancer    History of dysplastic nevus    Family history of coronary artery disease    Symptomatic PVCs    Insomnia       she has been referred by Mindy Hope MD for evaluation and management for   Chief Complaint   Patient presents with    Sleep Apnea       Chief Complaint: Sleep Apnea      HPI:  She presents for a sleep evaluation patient referred by primary care provider since patient complained of not sleeping well. Patient reports she does not sleep through night and about once every 2 weeks she does not sleep at all, patient feels long standing cannot sleep well. Buspar prescribed over the past year has helped curb anxiety, but not insomnia. Takes 3 mg Melatonin 30 minutes before bedtime, with benefit.   Patient has observed snoring, periods of not breathing witnessed by her son when patient naps on sofa, son will see her with shallow breathing, then a gasp for air. Pt states she is not awakening herself with gasping, feels sleepy during the day, take naps during the day.   Patient reports restful sleep sometimes.  She denies morning headache.   She reports day time napping; duration 30 - 40 Minutes, sets an alarm.   She denies recent weight gain.  Cardiovascular risk factors: hypertension and hyperlipidemia  Bed time is 1030  Wake time is 0500  Sleep onset is within 30 Minutes.  Sleep maintenance difficulties related to frequent night time awakening  Wake after sleep onset occurs two times a  night.  Nocturia occurs two times a night,   Sleep aids : YES, 3 mg Melatonin 30 minutes before bedtime, with benefit  Dry mouth : YES, aware of opening mouth during sleep.   Sleep walking:  NO  Sleep talking :  NO  Sleep eating: NO  Vivid Dreams :  NO  Cataplexy :  NO    [place] Sleepiness Scale       2/1/2024    10:57 AM   EPWORTH SLEEPINESS SCALE   Sitting and reading 1   Watching TV 0   Sitting, inactive in a public place (e.g. a theatre or a meeting) 2   As a passenger in a car for an hour without a break 0   Lying down to rest in the afternoon when circumstances permit 2   Sitting and talking to someone 0   Sitting quietly after a lunch without alcohol 2   In a car, while stopped for a few minutes in traffic 0   Total score 7     Neck circumference is 39.5 cm. (15.5 inches).  Mallampati score 3    STOP - BANG Questionnaire:   1. Snoring : Do you snore loudly ?     YES  2. Tired : Do you often feel tired, fatigued, or sleepy during daytime?   YES  3. Observed: Has anyone observed you stop breathing during your sleep?     YES  4. Blood pressure : Do you have or are you being treated for high blood pressure?    YES  5. BMI :BMI more than 35 kg/m2?   NO  6. Age : Age over 50 yr old?    YES  7. Neck circumference: Neck circumference greater than 40 cm?   NO  8. Gender: Gender male?   NO    SCORE: 5    High risk of JESUS: Yes 5 - 8  Intermediate risk of JESUS: Yes 3 - 4  Low risk of JESUS: Yes 0 - 2    Previous Report Reviewed: lab reports and office notes     Past Medical History: The following portions of the patient's history were reviewed and updated as appropriate:   She  has a past surgical history that includes none and Cataract extraction (Right).  Her family history is not on file.  She  reports that she has never smoked. She has never used smokeless tobacco. No history on file for alcohol use and drug use.  She has a current medication list which includes the following prescription(s): amlodipine, boostrix tdap,  "buspirone, hydrochlorothiazide, magnesium oxide, metoprolol succinate, omeprazole, and vitamin d.  She is allergic to statins-hmg-coa reductase inhibitors and sulfa (sulfonamide antibiotics)..    Review of Systems   Constitutional:  Negative for fever, chills, weight loss, weight gain, activity change, appetite change, fatigue and night sweats.   HENT:  Negative for postnasal drip, rhinorrhea, sinus pressure, voice change and congestion.    Eyes:  Negative for redness and itching.   Respiratory:  Positive for snoring. Negative for cough, sputum production, chest tightness, shortness of breath, wheezing, orthopnea, asthma nighttime symptoms, dyspnea on extertion, use of rescue inhaler and somnolence.    Cardiovascular: Negative.  Negative for chest pain, palpitations and leg swelling.   Genitourinary:  Negative for difficulty urinating and hematuria.   Endocrine:  Negative for cold intolerance and heat intolerance.    Musculoskeletal:  Negative for arthralgias, gait problem, joint swelling and myalgias.   Skin: Negative.    Gastrointestinal:  Negative for nausea, vomiting, abdominal pain and acid reflux.   Neurological:  Negative for dizziness, weakness, light-headedness and headaches.   Hematological:  Negative for adenopathy. No excessive bruising.   All other systems reviewed and are negative.     Objective:   /72   Pulse 65   Resp 16   Ht 5' 6" (1.676 m)   Wt 79.7 kg (175 lb 11.3 oz)   SpO2 95%   BMI 28.36 kg/m²   Physical Exam  Vitals and nursing note reviewed.   Constitutional:       General: She is not in acute distress.     Appearance: Normal appearance. She is well-developed. She is not ill-appearing or toxic-appearing.   HENT:      Head: Normocephalic.      Right Ear: External ear normal.      Left Ear: External ear normal.      Nose: Nose normal.      Mouth/Throat:      Pharynx: No oropharyngeal exudate.   Eyes:      Conjunctiva/sclera: Conjunctivae normal.   Cardiovascular:      Rate and " Rhythm: Normal rate and regular rhythm.      Heart sounds: Normal heart sounds.   Pulmonary:      Effort: Pulmonary effort is normal.      Breath sounds: Normal breath sounds. No stridor.   Abdominal:      Palpations: Abdomen is soft.   Musculoskeletal:         General: Normal range of motion.      Cervical back: Normal range of motion and neck supple.   Lymphadenopathy:      Cervical: No cervical adenopathy.   Skin:     General: Skin is warm and dry.   Neurological:      Mental Status: She is alert and oriented to person, place, and time.   Psychiatric:         Behavior: Behavior normal. Behavior is cooperative.         Thought Content: Thought content normal.         Judgment: Judgment normal.         Personal Diagnostic Review  Review of labs, xray's, cardiology reports.     Assessment:     1. Sleep-disordered breathing    2. Daytime sleepiness    3. Insomnia, unspecified type    4. Essential hypertension    5. Situational anxiety      Orders Placed This Encounter   Procedures    Polysomnogram (CPAP will be added if patient meets diagnostic criteria.)     Standing Status:   Future     Standing Expiration Date:   2/1/2025     Plan:   Discussed diagnosis, its evaluation, treatment and usual course. All questions answered.  Problem List Items Addressed This Visit       Situational anxiety     Improved with Buspar. 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          Insomnia     Long standing  Melatonin 3 mg helps.  Proceed with PSG evaluate for obstructive sleep apnea  Continue melatonin 3 mg before bedtime         Relevant Orders    Polysomnogram (CPAP will be added if patient meets diagnostic criteria.)    Essential hypertension     Stable and controlled. Continue current treatment plan as previously prescribed with your PCP.             Other Visit Diagnoses       Sleep-disordered breathing    -  Primary    high risk tristan, proceed with PSG study    Relevant  Orders    Polysomnogram (CPAP will be added if patient meets diagnostic criteria.)    Daytime sleepiness        Relevant Orders    Polysomnogram (CPAP will be added if patient meets diagnostic criteria.)          Follow up for if JESUS, begin PAP therapy with mask used in sleep lab then fu IDL.    Thank you for the opportunity to participate in the care of this patient.

## 2024-02-01 NOTE — ASSESSMENT & PLAN NOTE
Improved with Buspar. 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

## 2024-02-02 PROBLEM — G47.00 INSOMNIA: Status: ACTIVE | Noted: 2024-02-02

## 2024-02-02 NOTE — ASSESSMENT & PLAN NOTE
Long standing  Melatonin 3 mg helps.  Proceed with PSG evaluate for obstructive sleep apnea  Continue melatonin 3 mg before bedtime

## 2024-02-15 ENCOUNTER — PATIENT MESSAGE (OUTPATIENT)
Dept: PULMONOLOGY | Facility: CLINIC | Age: 79
End: 2024-02-15
Payer: MEDICARE

## 2024-02-15 DIAGNOSIS — G47.00 INSOMNIA, UNSPECIFIED TYPE: ICD-10-CM

## 2024-02-15 DIAGNOSIS — I10 ESSENTIAL HYPERTENSION: ICD-10-CM

## 2024-02-15 DIAGNOSIS — G47.30 SLEEP-DISORDERED BREATHING: Primary | ICD-10-CM

## 2024-02-15 DIAGNOSIS — R40.0 DAYTIME SLEEPINESS: ICD-10-CM

## 2024-02-16 ENCOUNTER — TELEPHONE (OUTPATIENT)
Dept: PULMONOLOGY | Facility: CLINIC | Age: 79
End: 2024-02-16
Payer: MEDICARE

## 2024-02-16 NOTE — TELEPHONE ENCOUNTER
----- Message from Liam Lozano sent at 2/16/2024  1:30 PM CST -----  [facility]

## 2024-02-16 NOTE — TELEPHONE ENCOUNTER
Orders Placed This Encounter   Procedures    Home Sleep Study     2/16/2024 Pt declined in lab sleep study after ordered. She request Home Sleep Study.     Standing Status:   Future     Standing Expiration Date:   2/16/2025     Scheduling Instructions:      2 night Home Sleep Study     1. Sleep-disordered breathing  Home Sleep Study      2. Daytime sleepiness  Home Sleep Study      3. Insomnia, unspecified type  Home Sleep Study      4. Essential hypertension  Home Sleep Study

## 2024-02-21 RX ORDER — AMLODIPINE BESYLATE 5 MG/1
TABLET ORAL
Qty: 90 TABLET | Refills: 1 | Status: SHIPPED | OUTPATIENT
Start: 2024-02-21 | End: 2024-03-26

## 2024-02-21 NOTE — TELEPHONE ENCOUNTER
Refill Decision Note   Dalia Peña  is requesting a refill authorization.  Brief Assessment and Rationale for Refill:  Approve     Medication Therapy Plan:         Comments:     Note composed:11:16 AM 02/21/2024             Appointments     Last Visit   8/11/2023 Giselle Murguia MD   Next Visit   3/15/2024 Giselle Murguia MD

## 2024-02-21 NOTE — TELEPHONE ENCOUNTER
No care due was identified.  Health [facility] Embedded Care Due Messages. Reference number: [id number].   2/21/2024 10:53:16 AM CST

## 2024-03-11 ENCOUNTER — TELEPHONE (OUTPATIENT)
Dept: PRIMARY CARE CLINIC | Facility: CLINIC | Age: 79
End: 2024-03-11
Payer: MEDICARE

## 2024-03-11 DIAGNOSIS — E55.9 VITAMIN D DEFICIENCY: ICD-10-CM

## 2024-03-11 DIAGNOSIS — I10 ESSENTIAL HYPERTENSION: ICD-10-CM

## 2024-03-11 DIAGNOSIS — Z11.59 ENCOUNTER FOR HEPATITIS C SCREENING TEST FOR LOW RISK PATIENT: Primary | ICD-10-CM

## 2024-03-18 ENCOUNTER — CLINICAL SUPPORT (OUTPATIENT)
Dept: PRIMARY CARE CLINIC | Facility: CLINIC | Age: 79
End: 2024-03-18
Payer: MEDICARE

## 2024-03-18 DIAGNOSIS — E55.9 VITAMIN D DEFICIENCY: ICD-10-CM

## 2024-03-18 DIAGNOSIS — Z11.59 ENCOUNTER FOR HEPATITIS C SCREENING TEST FOR LOW RISK PATIENT: ICD-10-CM

## 2024-03-18 DIAGNOSIS — I10 ESSENTIAL HYPERTENSION: ICD-10-CM

## 2024-03-18 LAB
25(OH)D3+25(OH)D2 SERPL-MCNC: 44 NG/ML (ref 30–96)
ANION GAP SERPL CALC-SCNC: 6 MMOL/L (ref 8–16)
BASOPHILS # BLD AUTO: 0.04 K/UL (ref 0–0.2)
BASOPHILS NFR BLD: 0.7 % (ref 0–1.9)
BUN SERPL-MCNC: 19 MG/DL (ref 8–23)
CALCIUM SERPL-MCNC: 9.8 MG/DL (ref 8.7–10.5)
CHLORIDE SERPL-SCNC: 107 MMOL/L (ref 95–110)
CO2 SERPL-SCNC: 27 MMOL/L (ref 23–29)
CREAT SERPL-MCNC: 0.8 MG/DL (ref 0.5–1.4)
DIFFERENTIAL METHOD BLD: ABNORMAL
EOSINOPHIL # BLD AUTO: 0.2 K/UL (ref 0–0.5)
EOSINOPHIL NFR BLD: 3.2 % (ref 0–8)
ERYTHROCYTE [DISTWIDTH] IN BLOOD BY AUTOMATED COUNT: 12.4 % (ref 11.5–14.5)
EST. GFR  (NO RACE VARIABLE): >60 ML/MIN/1.73 M^2
GLUCOSE SERPL-MCNC: 96 MG/DL (ref 70–110)
HCT VFR BLD AUTO: 42.8 % (ref 37–48.5)
HCV AB SERPL QL IA: NORMAL
HGB BLD-MCNC: 13.8 G/DL (ref 12–16)
IMM GRANULOCYTES # BLD AUTO: 0.02 K/UL (ref 0–0.04)
IMM GRANULOCYTES NFR BLD AUTO: 0.3 % (ref 0–0.5)
LYMPHOCYTES # BLD AUTO: 2.2 K/UL (ref 1–4.8)
LYMPHOCYTES NFR BLD: 37.2 % (ref 18–48)
MCH RBC QN AUTO: 31.1 PG (ref 27–31)
MCHC RBC AUTO-ENTMCNC: 32.2 G/DL (ref 32–36)
MCV RBC AUTO: 96 FL (ref 82–98)
MONOCYTES # BLD AUTO: 0.4 K/UL (ref 0.3–1)
MONOCYTES NFR BLD: 6 % (ref 4–15)
NEUTROPHILS # BLD AUTO: 3.1 K/UL (ref 1.8–7.7)
NEUTROPHILS NFR BLD: 52.6 % (ref 38–73)
NRBC BLD-RTO: 0 /100 WBC
PLATELET # BLD AUTO: 256 K/UL (ref 150–450)
PMV BLD AUTO: 10.1 FL (ref 9.2–12.9)
POTASSIUM SERPL-SCNC: 4.5 MMOL/L (ref 3.5–5.1)
RBC # BLD AUTO: 4.44 M/UL (ref 4–5.4)
SODIUM SERPL-SCNC: 140 MMOL/L (ref 136–145)
WBC # BLD AUTO: 5.96 K/UL (ref 3.9–12.7)

## 2024-03-18 PROCEDURE — 82306 VITAMIN D 25 HYDROXY: CPT | Performed by: FAMILY MEDICINE

## 2024-03-18 PROCEDURE — 85025 COMPLETE CBC W/AUTO DIFF WBC: CPT | Performed by: FAMILY MEDICINE

## 2024-03-18 PROCEDURE — 80048 BASIC METABOLIC PNL TOTAL CA: CPT | Performed by: FAMILY MEDICINE

## 2024-03-18 PROCEDURE — 86803 HEPATITIS C AB TEST: CPT | Performed by: FAMILY MEDICINE

## 2024-03-26 ENCOUNTER — OFFICE VISIT (OUTPATIENT)
Dept: PRIMARY CARE CLINIC | Facility: CLINIC | Age: 79
End: 2024-03-26
Payer: MEDICARE

## 2024-03-26 VITALS
HEIGHT: 66 IN | SYSTOLIC BLOOD PRESSURE: 128 MMHG | TEMPERATURE: 98 F | OXYGEN SATURATION: 97 % | DIASTOLIC BLOOD PRESSURE: 60 MMHG | BODY MASS INDEX: 28.34 KG/M2 | HEART RATE: 67 BPM | WEIGHT: 176.38 LBS

## 2024-03-26 DIAGNOSIS — E78.49 OTHER HYPERLIPIDEMIA: ICD-10-CM

## 2024-03-26 DIAGNOSIS — T46.6X5A STATIN MYOPATHY: ICD-10-CM

## 2024-03-26 DIAGNOSIS — F51.04 PSYCHOPHYSIOLOGICAL INSOMNIA: ICD-10-CM

## 2024-03-26 DIAGNOSIS — I10 ESSENTIAL HYPERTENSION: Primary | ICD-10-CM

## 2024-03-26 DIAGNOSIS — G72.0 STATIN MYOPATHY: ICD-10-CM

## 2024-03-26 LAB
CHOLEST SERPL-MCNC: 210 MG/DL (ref 120–199)
CHOLEST/HDLC SERPL: 5.3 {RATIO} (ref 2–5)
HDLC SERPL-MCNC: 40 MG/DL (ref 40–75)
HDLC SERPL: 19 % (ref 20–50)
LDLC SERPL CALC-MCNC: 119.8 MG/DL (ref 63–159)
NONHDLC SERPL-MCNC: 170 MG/DL
TRIGL SERPL-MCNC: 251 MG/DL (ref 30–150)

## 2024-03-26 PROCEDURE — 99999 PR PBB SHADOW E&M-EST. PATIENT-LVL IV: CPT | Mod: PBBFAC,,, | Performed by: FAMILY MEDICINE

## 2024-03-26 PROCEDURE — 99214 OFFICE O/P EST MOD 30 MIN: CPT | Mod: S$GLB,,, | Performed by: FAMILY MEDICINE

## 2024-03-26 PROCEDURE — 80061 LIPID PANEL: CPT | Performed by: FAMILY MEDICINE

## 2024-03-26 NOTE — ASSESSMENT & PLAN NOTE
Repeat lipid  
Stop amlodipine and continue HCTZ   
Upcoming home sleep study per pulmonology    Long standing  Melatonin 3 mg helps.  Proceed with PSG evaluate for obstructive sleep apnea  Continue melatonin 3 mg before bedtime  
Alert-The patient is alert, awake and responds to voice. The patient is oriented to time, place, and person. The triage nurse is able to obtain subjective information.

## 2024-03-26 NOTE — PATIENT INSTRUCTIONS
Stop amlodipine and let us know what your BP is running at home. We may need to adjust the dose of hydrochlorothiazide.     Start weaning buspar to 1 tablet daily.

## 2024-03-26 NOTE — PROGRESS NOTES
Subjective:       Patient ID: Dalia Peña is a 78 y.o. female.    Chief Complaint: Cough, Medication Refill, and Hyperlipidemia    HPI:     Here for follow up. She would like to get off of some of her blood pressure medication. HCTZ added because of ankle edema with amlodipine. Otherwise, doing well. 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 Has established care with RODRIGO Guillen; brief pause due to business of the tax season but has f/u appt scheduled. She attended a musical with [employer]erson Sherwood and had a wonderful time. Still with lots of care-giving responsibilities--participates in care of her sister (bathes her regularly),  has had health decline, and they have custody of their 12 year old grandson who eventually will move to the care of his uncle. BP at home 120s/60s      Updated/ annual due 3/24:  HM:  10/23 fluvax, 5/21 covid vaccines, 2/17 [id number], 11/18 [id number], 3/23 TDaP, 2022 Shingrix x2, 5/23 MMG, 9/22 BMD rep 2y, Cscope at 73yo rep 10y  .  Objective:     Vitals:    03/26/24 1105   BP: 128/60   Pulse: 67   Temp: 97.6 °F (36.4 °C)     Wt Readings from Last 3 Encounters:   03/26/24 80 kg (176 lb 5.9 oz)   02/01/24 79.7 kg (175 lb 11.3 oz)   01/10/24 80.1 kg (176 lb 9.4 oz)     Temp Readings from Last 3 Encounters:   03/26/24 97.6 °F (36.4 °C) (Oral)   01/10/24 97.7 °F (36.5 °C) (Oral)   10/24/23 98 °F (36.7 °C) (Oral)     BP Readings from Last 3 Encounters:   03/26/24 128/60   02/01/24 132/72   01/10/24 130/72     Pulse Readings from Last 3 Encounters:   03/26/24 67   02/01/24 65   01/10/24 70          Physical Exam  Vitals and nursing note reviewed.   Constitutional:       General: She is not in acute distress.     Appearance: She is well-developed. She is not ill-appearing.   HENT:      Head: Normocephalic and atraumatic.      Nose: Nose normal.      Mouth/Throat:      Mouth: Mucous membranes are moist.      Pharynx: No oropharyngeal exudate.   Eyes:      General: No  scleral icterus.     Pupils: Pupils are equal, round, and reactive to light.   Cardiovascular:      Rate and Rhythm: Normal rate and regular rhythm.   Pulmonary:      Effort: Pulmonary effort is normal.      Breath sounds: Normal breath sounds.   Abdominal:      General: Bowel sounds are normal.      Palpations: Abdomen is soft.   Musculoskeletal:         General: No deformity. Normal range of motion.      Cervical back: Normal range of motion and neck supple.   Skin:     General: Skin is warm and dry.   Neurological:      Mental Status: She is alert and oriented to person, place, and time.   Psychiatric:         Mood and Affect: Mood normal.         Behavior: Behavior normal.         Thought Content: Thought content normal.           Albumin   Date Value Ref Range Status   03/09/2023 3.9 3.5 - 5.2 g/dL Final     eGFR   Date Value Ref Range Status   03/18/2024 >60.0 >60 mL/min/1.73 m^2 Final       Assessment:       1. Essential hypertension    2. Other hyperlipidemia    3. Psychophysiological insomnia    4. Statin myopathy        Plan:           Problem List Items Addressed This Visit          Cardiac/Vascular    Essential hypertension - Primary    Current Assessment & Plan     Stop amlodipine and continue HCTZ          Other hyperlipidemia    Current Assessment & Plan     Repeat lipid         Relevant Orders    Lipid Panel       Orthopedic    Statin myopathy    Overview     Had fatigue and generalized body aches            Other    Insomnia    Current Assessment & Plan     Upcoming home sleep study per pulmonology    Long standing  Melatonin 3 mg helps.  Proceed with PSG evaluate for obstructive sleep apnea  Continue melatonin 3 mg before bedtime          OK to wean buspar to once daily (from BID) per patient preference  Repeat lipid today off of statin   Continue with LCSW   6 month f/u

## 2024-04-03 ENCOUNTER — PATIENT MESSAGE (OUTPATIENT)
Dept: PULMONOLOGY | Facility: CLINIC | Age: 79
End: 2024-04-03
Payer: MEDICARE

## 2024-04-23 ENCOUNTER — CLINICAL SUPPORT (OUTPATIENT)
Dept: PRIMARY CARE CLINIC | Facility: CLINIC | Age: 79
End: 2024-04-23
Payer: MEDICARE

## 2024-04-23 DIAGNOSIS — F41.9 ANXIETY: Primary | ICD-10-CM

## 2024-04-23 PROCEDURE — 99499 UNLISTED E&M SERVICE: CPT | Mod: S$GLB,,,

## 2024-04-29 RX ORDER — BUSPIRONE HYDROCHLORIDE 5 MG/1
5 TABLET ORAL 2 TIMES DAILY
Qty: 60 TABLET | Refills: 3 | Status: SHIPPED | OUTPATIENT
Start: 2024-04-29

## 2024-05-15 PROCEDURE — 95806 SLEEP STUDY UNATT&RESP EFFT: CPT | Performed by: INTERNAL MEDICINE

## 2024-05-16 PROCEDURE — 95806 SLEEP STUDY UNATT&RESP EFFT: CPT

## 2024-05-20 ENCOUNTER — HOSPITAL ENCOUNTER (OUTPATIENT)
Dept: SLEEP MEDICINE | Facility: HOSPITAL | Age: 79
Discharge: HOME | End: 2024-05-20
Attending: NURSE PRACTITIONER
Payer: MEDICARE

## 2024-05-20 DIAGNOSIS — I10 ESSENTIAL HYPERTENSION: ICD-10-CM

## 2024-05-20 DIAGNOSIS — R40.0 DAYTIME SLEEPINESS: ICD-10-CM

## 2024-05-20 DIAGNOSIS — G47.30 SLEEP-DISORDERED BREATHING: ICD-10-CM

## 2024-05-20 DIAGNOSIS — G47.00 INSOMNIA, UNSPECIFIED TYPE: ICD-10-CM

## 2024-05-20 DIAGNOSIS — G47.33 OSA (OBSTRUCTIVE SLEEP APNEA): Primary | ICD-10-CM

## 2024-05-20 PROCEDURE — 95806 SLEEP STUDY UNATT&RESP EFFT: CPT | Performed by: INTERNAL MEDICINE

## 2024-05-20 PROCEDURE — 95806 SLEEP STUDY UNATT&RESP EFFT: CPT | Mod: 26,,, | Performed by: INTERNAL MEDICINE

## 2024-05-20 NOTE — PROCEDURES
PHYSICIAN INTERPRETATION AND COMMENTS: Findings are consistent with moderate, positional obstructive sleep  apnea(JESUS).  CLINICAL HISTORY: 78 year old female presented with: 15 inch neck, BMI of 27.4, an [place] sleepiness score of 16, history  of hypertension and symptoms of nocturnal snoring. Based on the clinical history, the patient has a high pre-test  probability of having Moderate JESUS.  SLEEP STUDY FINDINGS: Patient underwent a 2 night Home Sleep Test and by behavioral criteria, slept for approximately  12.01 hours, with a sleep latency of 6 minutes and a sleep efficiency of 89.1%. Moderate sleep disordered breathing (AHI=17)  is noted based on a 4% hypopnea desaturation criteria, predominantly in the supine position (45 events/hour). The patient  slept supine 36.1% of the night based on valid recording time of 12.07 hours and is 22.5 times as likely to have  apneas/hypopneas when supine. When considering more subtle measures of sleep disordered breathing, the overall  respiratory disturbance index is moderate(RDI=28) based on a 1% hypopnea desaturation criteria with confirmation by  surrogate arousal indicators. The apneas/hypopneas are accompanied by minimal oxygen desaturation (percent time  below 90% SpO2: 2.1%, Min SpO2: 81%). The average desaturation across all sleep disordered breathing events is 4.2%.  Snoring occurs for 8.4% (30 dB) of the study, 3.4% is very loud. The mean pulse rate is 59.2 BPM, with frequent pulse rate  variability (48 events with >= 6 BPM increase/decrease per hour).  TREATMENT CONSIDERATIONS: Consider nasal continuous positive airway pressure (CPAP/AutoPAP) as the initial  treatment choice based on the AHI severity, daytime somnolence and co-morbidities. A mandibular advancement splint  (MAS) or referral to an ENT surgeon for modification to the airway should be considered to reduce daytime somnolence  and the potential contribution of JESUS on existing diseases if the patient  "prefers an alternative therapy or the CPAP trial is  unsuccessful. Based on the JESUS Supine data in the study, a Mandibular Advancement Splint (MAS) will likely provide  treatment benefit independent of JESUS severity. The patient should avoid sleeping supine; the non-supine AHI is 22.5 times  less severe than the supine AHI.  DISEASE MANAGEMENT CONSIDERATIONS: None[place]  [address] [facility]  [place] [address]/Giselle Murguia MD         The sleep study that you ordered is complete.  You have ordered sleep LAB services to perform the sleep study for Dalia Peña .      Please find Sleep Study result in  the "Media tab" of Chart Review menu.        You can look  for the report in the  Media by the document type "Sleep Study Documents". Alphabetizing  "Document type" column helps to find the SLEEP STUDY report  Faster.       As the ordering provider, you are responsible for reviewing the results and implementing a treatment plan with your patient.    If you need a Sleep Medicine provider to explain the sleep study findings and arrange treatment for the patient, please refer patient for consultation to our Sleep Clinic via [facility] with Ambulatory Consult Sleep.     To do that please place an order for an  "Ambulatory Consult Sleep" -  order , it will go to our clinic work queue for our staff  to contact the patient for an appointment.      For any questions, please contact our sleep lab  staff at [phone] to talk to clinical staff          Gavin Washington MD   "

## 2024-05-21 ENCOUNTER — PATIENT MESSAGE (OUTPATIENT)
Dept: PULMONOLOGY | Facility: CLINIC | Age: 79
End: 2024-05-21
Payer: MEDICARE

## 2024-05-21 DIAGNOSIS — G47.33 OBSTRUCTIVE SLEEP APNEA: Primary | ICD-10-CM

## 2024-05-21 NOTE — ASSESSMENT & PLAN NOTE
5/15/2024, 5/16/2024 Home Sleep Study  2 nights Moderate sleep disordered breathing (AHI=17).   5/21/2024 order Auto CPAP 5-20 cm with nasal mask or mask of choice  Follow up 31-90 days from obtaining PAP therapy for IDL, patient request [place] location follow up in morning.

## 2024-05-21 NOTE — PROGRESS NOTES
Orders Placed This Encounter   Procedures    CPAP FOR HOME USE     5/15/2024, 5/16/2024 Home Sleep Study  2 nights Moderate sleep disordered breathing (AHI=17)  is noted based on a 4% hypopnea desaturation criteria, predominantly in the supine position (45 events/hour).     Order Specific Question:   Length of need (1-99 months):     Answer:   99     Order Specific Question:   Type ():     Answer:   Auto CPAP     Order Specific Question:   Auto CPAP pressure setting range (cmH20):     Answer:   5-20     Order Specific Question:   Fulfillment Priority:     Answer:   Level 4:  all others     Order Specific Question:   Humidification ():     Answer:   Heated     Order Specific Question:   Choose ONE mask type and its corresponding cushions and/or pillows:     Answer:    Nasal Mask, 1 per 90 days:  Nasal Cushions, (6 per 90 days):  Nasal Pillows, (6 per 90 days)     Comments:   or mask of choice     Order Specific Question:   Choose EITHER Heated or Non-Heated Tubjing     Answer:    Non-Heated Tubing, 1 per 90 days     Order Specific Question:   Number of Days Needed:     Answer:   99     Order Specific Question:   All other supplies as needed as listed below:     Answer:    Headgear, 1 per 180 days     Order Specific Question:   All other supplies as needed as listed below:     Answer:    Chin Strap, 1 per 180 days     Order Specific Question:   All other supplies as needed as listed below:     Answer:    Disposable Filter, 6 per 90 days     Order Specific Question:   All other supplies as needed as listed below:     Answer:    Non-Disposable Filter, 1 per 180 days     Order Specific Question:   All other supplies as needed as listed below:     Answer:    Humidifier Chamber, 1 per 180 days     Problem List Items Addressed This Visit       Obstructive sleep apnea - Primary     5/15/2024, 5/16/2024 Home Sleep Study  2 nights Moderate sleep disordered  breathing (AHI=17).   5/21/2024 order Auto CPAP 5-20 cm with nasal mask or mask of choice  Follow up 31-90 days from obtaining PAP therapy for IDL, patient request [place] location follow up in morning.            Relevant Orders    CPAP FOR HOME USE     Follow up in about 10 weeks (around 7/30/2024) for CPAP compliance download after initial set up.

## 2024-05-22 NOTE — TELEPHONE ENCOUNTER
Telephoned spoke with patient advised patient I had to order CPAP for her to obtain, she just wanted to make sure she obtained an ResMed air sense 11 machine. She will speak to [facility] about her desire for ResMed air sense 11. Her son in [place] works in sleep med and recommended she obtain Resmed airsense 11.     Patient will await call from [facility] after they obtain approved from her insurance, [employer].

## 2024-05-30 ENCOUNTER — PATIENT MESSAGE (OUTPATIENT)
Dept: PRIMARY CARE CLINIC | Facility: CLINIC | Age: 79
End: 2024-05-30
Payer: MEDICARE

## 2024-05-30 ENCOUNTER — TELEPHONE (OUTPATIENT)
Dept: PRIMARY CARE CLINIC | Facility: CLINIC | Age: 79
End: 2024-05-30
Payer: MEDICARE

## 2024-05-30 ENCOUNTER — LAB VISIT (OUTPATIENT)
Dept: LAB | Facility: HOSPITAL | Age: 79
End: 2024-05-30
Payer: MEDICARE

## 2024-05-30 DIAGNOSIS — R30.0 DYSURIA: ICD-10-CM

## 2024-05-30 DIAGNOSIS — R30.0 DYSURIA: Primary | ICD-10-CM

## 2024-05-30 LAB
BACTERIA #/AREA URNS HPF: ABNORMAL /HPF
BILIRUB UR QL STRIP: NEGATIVE
CLARITY UR: ABNORMAL
COLOR UR: YELLOW
GLUCOSE UR QL STRIP: NEGATIVE
HGB UR QL STRIP: ABNORMAL
KETONES UR QL STRIP: NEGATIVE
LEUKOCYTE ESTERASE UR QL STRIP: ABNORMAL
MICROSCOPIC COMMENT: ABNORMAL
NITRITE UR QL STRIP: NEGATIVE
PH UR STRIP: 6 [PH] (ref 5–8)
PROT UR QL STRIP: NEGATIVE
RBC #/AREA URNS HPF: 15 /HPF (ref 0–4)
SP GR UR STRIP: >=1.03 (ref 1–1.03)
SQUAMOUS #/AREA URNS HPF: 2 /HPF
URN SPEC COLLECT METH UR: ABNORMAL
WBC #/AREA URNS HPF: >100 /HPF (ref 0–5)

## 2024-05-30 PROCEDURE — 87088 URINE BACTERIA CULTURE: CPT | Performed by: INTERNAL MEDICINE

## 2024-05-30 PROCEDURE — 87077 CULTURE AEROBIC IDENTIFY: CPT | Performed by: INTERNAL MEDICINE

## 2024-05-30 PROCEDURE — 81000 URINALYSIS NONAUTO W/SCOPE: CPT | Performed by: INTERNAL MEDICINE

## 2024-05-30 PROCEDURE — 87186 SC STD MICRODIL/AGAR DIL: CPT | Performed by: INTERNAL MEDICINE

## 2024-05-30 PROCEDURE — 87086 URINE CULTURE/COLONY COUNT: CPT | Performed by: INTERNAL MEDICINE

## 2024-05-30 RX ORDER — CIPROFLOXACIN 250 MG/1
250 TABLET, FILM COATED ORAL 2 TIMES DAILY
Qty: 10 TABLET | Refills: 0 | Status: SHIPPED | OUTPATIENT
Start: 2024-05-30 | End: 2024-06-04

## 2024-05-30 NOTE — TELEPHONE ENCOUNTER
----- Message from Maya Santo sent at 5/30/2024 10:22 AM CDT -----  Contact: [phone]  1MEDICALADVICE     Patient is calling for Medical Advice regarding:UTI    How long has patient had these symptoms: 24 hours    Pharmacy name and phone#:   [id number] - [place] - [address] [facility], [address], [facility] [address]  Phone: [phone] Fax: [phone]        Would like response via [employer]:  call    Comments:  patient would like to know if she needs to come in or can you prescribe medication

## 2024-05-30 NOTE — TELEPHONE ENCOUNTER
Please tell he I sent antibiotic to [facility] pharmacy, but please come give urine sample at the site best for her.  SM

## 2024-05-30 NOTE — TELEPHONE ENCOUNTER
Pt states she is having dysuria and pressure since yesterday    Pt asking if you can call something in or does she need to be seen.    Denies fever      [facility]

## 2024-06-01 LAB — BACTERIA UR CULT: ABNORMAL

## 2024-06-03 ENCOUNTER — PATIENT MESSAGE (OUTPATIENT)
Dept: PRIMARY CARE CLINIC | Facility: CLINIC | Age: 79
End: 2024-06-03
Payer: MEDICARE

## 2024-06-03 DIAGNOSIS — R30.0 DYSURIA: Primary | ICD-10-CM

## 2024-06-04 ENCOUNTER — PATIENT MESSAGE (OUTPATIENT)
Dept: PRIMARY CARE CLINIC | Facility: CLINIC | Age: 79
End: 2024-06-04
Payer: MEDICARE

## 2024-06-04 ENCOUNTER — LAB VISIT (OUTPATIENT)
Dept: LAB | Facility: HOSPITAL | Age: 79
End: 2024-06-04
Payer: MEDICARE

## 2024-06-04 DIAGNOSIS — R30.0 DYSURIA: ICD-10-CM

## 2024-06-04 LAB
BILIRUB UR QL STRIP: NEGATIVE
CLARITY UR: CLEAR
COLOR UR: YELLOW
GLUCOSE UR QL STRIP: NEGATIVE
HGB UR QL STRIP: NEGATIVE
KETONES UR QL STRIP: NEGATIVE
LEUKOCYTE ESTERASE UR QL STRIP: NEGATIVE
NITRITE UR QL STRIP: NEGATIVE
PH UR STRIP: 6 [PH] (ref 5–8)
PROT UR QL STRIP: NEGATIVE
SP GR UR STRIP: >=1.03 (ref 1–1.03)
URN SPEC COLLECT METH UR: ABNORMAL

## 2024-06-04 PROCEDURE — 81003 URINALYSIS AUTO W/O SCOPE: CPT | Performed by: INTERNAL MEDICINE

## 2024-06-10 ENCOUNTER — TELEPHONE (OUTPATIENT)
Dept: PRIMARY CARE CLINIC | Facility: CLINIC | Age: 79
End: 2024-06-10
Payer: MEDICARE

## 2024-06-12 ENCOUNTER — CLINICAL SUPPORT (OUTPATIENT)
Dept: PRIMARY CARE CLINIC | Facility: CLINIC | Age: 79
End: 2024-06-12
Payer: MEDICARE

## 2024-06-12 DIAGNOSIS — F41.9 ANXIETY: Primary | ICD-10-CM

## 2024-06-12 DIAGNOSIS — Z63.6 CAREGIVER STRESS: ICD-10-CM

## 2024-06-12 PROCEDURE — 99499 UNLISTED E&M SERVICE: CPT | Mod: S$GLB,,,

## 2024-06-12 SDOH — SOCIAL DETERMINANTS OF HEALTH (SDOH): DEPENDENT RELATIVE NEEDING CARE AT HOME: Z63.6

## 2024-06-12 NOTE — PROGRESS NOTES
"Individual Psychotherapy (LCSW)  Dalia MARIE Nelsonjackson,  6/12/2024  DATE:  6/12/2024  TYPE OF VISIT:  In person  LENGTH OF SESSION: 45    Therapeutic Intervention: Met with patient for individual psychotherapy.    Chief complaint/reason for encounter: anxiety and interpersonal     Session Content/Presenting Problem:    Patient discusses a number of stressful situations she is currently dealing with. Her  Satnam has been receiving cancer treatment for a number of months. He had new scans which showed no improvement in his cancer. Requested a second opinion from his oncologist. This has not happened yet. Some frustration with [facility]; they just ended their relationship with the [facility]; they no longer offer a patient navigator to assist with treatment planning. Satnam has been weak and sick, sleeping often. He is hopeful that patient will get a break from him; he urged her to take Neal to [place]. Patient expresses reluctance to be gone longer than a day at this time. Discussed possible options she may take for a quick rest and relaxation. Has made plans with her son Jorge for him to have Neal with him in [place] for about 3 weeks.  She will try to schedule some things while Neal is out of town.   Patient is also helping Jorge to buy a new house; his relationship with his wife appears to be non salvageable.  Patient feels "sad" that she has not planned a family trip for Neal this summer. He is able to go to the [employer] pool.   Patient is also trying to deal with her sister Marina's situation. Has looked at some facilities for possible long term placement.  Some issues dealing with Marina's s.o.  May need to talk with his family, and an , in trying to figure out the financial situation for each of them. Patient is in touch with Marina's son and recently helped him after a surgery.    Patient is the person everyone in the family looks to for assistance; she helps with taxes, paperwork, etc. " Patient recognizes this; she tries to be mindful of needing time to focus on herself and to avoid feeling overwhelmed.  Encouraged patient to find time for herself when possible. Patient says she is always able to find sol in the day no matter what she is experiencing.         Past Sessions:   Met with patient for a 3 month follow up. Patient was busy with tax season. Also dealing with  Satnam's new cancer diagnosis and treatment. Satnam is currently doing weekly infusions at [facility].  He recently had an MRI of the head and is going in for a f/u with the doctor to discuss the results.  Patient understands that Satnam's treatment is not curative. Satnam does hope to live another 6 years to see their grandson Neal graduate from high school.  Patient spends much of her time with Satnam but is trying to carve out at least an hour per week for herself.  She names things she enjoys doing such as walking in the park and going to the library. She is happy to report that Neal has become more independent in the last few months. He is sleeping and bathing on his own. He does still come to them if he can't sleep for some reason.  He is doing well in school.  Patient brought him with them to attend a healing service in [place]; says Neal cried a lot there but she is not sure what prompted that. He does not seem fully aware of what is going on with his grandfather.  Discussed finding a new pediatric counselor for Neal; discussed possible availability of same through [facility].  Patient has also been busy assisting the rest of the family. She describes a poor living situation for her sister, Ruth and Ruth's s.o.  at [facility]. Patient is there often, cleaning the bathroom and kitchen, buying them supplies of depends and groceries, etc. She is beginning her search for a nursing home; discussed possible resources and options for both Marina and her s.o. to move to a NH together.  She has also been helping her nephew  "with his taxes and financial needs.  Discussed their son Rashaad's offer to come from [place] on a regular basis; she will encourage him to stay and work until she truly needs him. She is hopeful that Neal will spend a good part of the summer with Rashaad in [place]. Patient's younger sister does come every other week to help out with Ruth. Their older sister does not get along with Ruth so does not come too often.    Patient is hopeful that she will take good enough care of herself that she will be able to continue to care for Neal Hay, and her sister. She bought a swim suit and plans to spend time at their neighborhood pool this year. She helped with cleanup at the pool to get ready for the season. She is sleeping better.  She will have a sleep study in May; her son is concerned she may have some sleep apnea. She is only taking Buspar once a day, no longer taking it at night.  Patient believes she is managing their situation well. She requests a follow up appt in 6 weeks.      Patient's spouse Satnam has been sick, in ER, lots of specialists and procedures. Had JESUS and A Fib, mobility issues. Has been caring for him for a while. Had a week of fever. PET scan tomorrow.  Possible lymphoma. Worried about him.  He wants her with him all the time.  Says "It's a burden to feel needed by so many."  Neal went to [place]; trip was "spectacular". Really good time.  Good relationship with Uncle Rashaad and his wife.   Holidays are hard, always the anxiety with preparation, etc. Preparing for Neal was easy this year; able to shop with him.   Neal had gotten sick with stomach virus during exam week. Got his exams done; had strep throat. He was sick and did not study for the exams. Does not want help with studying, but he did fine. Neal has been able to walk to bus on his own now. Trying to encourage him to eat variety of foods.   Will be busy with tax returns soon.  Helping her nephew move Marina into assisted living.  Plans to have " "a meeting with the sisters and her nephew.   Marina's partner of 16 years lives with her. Neither has any money anymore. He has a daughter and other kids.  Sandy is the go to person in the family to take care of all important decisions.  Patient sometimes worries about all of the people that depend on her. She worries especially about Satnam and how she would care for Neal if Satnam ends up hospitalized.  Discussed possible options. Encouraged patient to be proactive in having options before they are needed. She does have a long time , Verito, who is very involved with her family. Neal has even stayed the night at Verito's house. Verito is busy with her own family so limited assistance. Encouraged patient to ask Verito if she may have other people that could possibly help.  Their son Rashaad may come home if Satnam has to be hospitalized; Rashaad is currently not working so has some free time.  Patient also mentions Neal's only . Leigh, who could be available if needed. Patient also mentions two neighbors who are helpful.  Limited but good support system.  Discussed patient caring for herself; she says she is good about practicing self care. She has a [employer] puzzle; she is currently reading 2 books.  Gets herself some treats-cherry pies.  "You have to make your own happiness"  After Nael goes to school she enjoys quiet time at home during which she reads and prays.  Encouraged her to nap if needed as she does say she may not be sleeping enough hours at night.    Patient requests a follow up after April 15th due to tax season.         Current symptoms:  Depression: hopelessness and fatigue.  Anxiety: excessive worrying.  Insomnia: non-restful sleep.  Deneen:  denies.  Psychosis: denies .      Risk parameters:  Patient reports no suicidal ideation  Patient reports no homicidal ideation  Patient reports no self-injurious behavior  Patient reports no violent behavior    MENTAL HEALTH STATUS " EXAM  General Appearance:  unremarkable, age appropriate   Speech: normal tone, normal rate, normal pitch, normal volume      Level of Cooperation: cooperative      Thought Processes: normal and logical   Mood: steady      Thought Content: normal, no suicidality, no homicidality, delusions, or paranoia   Affect: congruent and appropriate   Orientation: Oriented x3   Attention Span & Concentration: intact   Fund of General Knowledge: intact and appropriate to age and level of education   Judgment & Insight: good     Language  intact       STRENGTHS AND LIABILITIES: Strength: Patient accepts guidance/feedback, Strength: Patient is expressive/articulate., Strength: Patient is intelligent., Strength: Patient is stable.    IMPRESSION:   My diagnostic impression is Anxiety disorders; anxiety, unspecified [F41.9], as evidenced by patient report of worrying too much, not being able to stop worrying, worrying about many things.     TREATMENT GOALS (per patient):    To manage anxiety related to 's cancer diagnosis; to take care of herself. To manage worry related to raising their 12 year old grandson, Neal.       Treatment plan:  Target symptoms: anxiety , adjustment  Why chosen therapy is appropriate versus another modality: relevant to diagnosis, patient responds to this modality, evidence based practice  Outcome monitoring methods: self-report, observation, checklist/rating scale  Therapeutic intervention type: insight oriented psychotherapy    Patient's response to intervention:  The patient's response to intervention is accepting.    Progress toward goals and other mental status changes:  The patient's progress toward goals is good.    Plan: Pt plans to continue individual psychotherapy CBT will be utilized in future individual therapy sessions to increase interaction, insight, support, and parent/behavior management.     Return to clinic: 1 month  July 10th

## 2024-06-17 ENCOUNTER — OFFICE VISIT (OUTPATIENT)
Dept: CARDIOLOGY | Facility: CLINIC | Age: 79
End: 2024-06-17
Payer: MEDICARE

## 2024-06-17 VITALS
SYSTOLIC BLOOD PRESSURE: 116 MMHG | HEART RATE: 80 BPM | BODY MASS INDEX: 28.27 KG/M2 | HEIGHT: 66 IN | DIASTOLIC BLOOD PRESSURE: 65 MMHG | WEIGHT: 175.94 LBS | OXYGEN SATURATION: 95 %

## 2024-06-17 DIAGNOSIS — R94.31 ABNORMAL ECG: ICD-10-CM

## 2024-06-17 DIAGNOSIS — I10 ESSENTIAL HYPERTENSION: ICD-10-CM

## 2024-06-17 DIAGNOSIS — E78.5 HYPERLIPIDEMIA, UNSPECIFIED HYPERLIPIDEMIA TYPE: ICD-10-CM

## 2024-06-17 DIAGNOSIS — R06.09 DOE (DYSPNEA ON EXERTION): Primary | ICD-10-CM

## 2024-06-17 DIAGNOSIS — I49.3 PVC (PREMATURE VENTRICULAR CONTRACTION): ICD-10-CM

## 2024-06-17 PROCEDURE — 1101F PT FALLS ASSESS-DOCD LE1/YR: CPT | Mod: CPTII,S$GLB,, | Performed by: INTERNAL MEDICINE

## 2024-06-17 PROCEDURE — 3074F SYST BP LT 130 MM HG: CPT | Mod: CPTII,S$GLB,, | Performed by: INTERNAL MEDICINE

## 2024-06-17 PROCEDURE — 1126F AMNT PAIN NOTED NONE PRSNT: CPT | Mod: CPTII,S$GLB,, | Performed by: INTERNAL MEDICINE

## 2024-06-17 PROCEDURE — 99214 OFFICE O/P EST MOD 30 MIN: CPT | Mod: S$GLB,,, | Performed by: INTERNAL MEDICINE

## 2024-06-17 PROCEDURE — 3288F FALL RISK ASSESSMENT DOCD: CPT | Mod: CPTII,S$GLB,, | Performed by: INTERNAL MEDICINE

## 2024-06-17 PROCEDURE — 99999 PR PBB SHADOW E&M-EST. PATIENT-LVL III: CPT | Mod: PBBFAC,,, | Performed by: INTERNAL MEDICINE

## 2024-06-17 PROCEDURE — 1159F MED LIST DOCD IN RCRD: CPT | Mod: CPTII,S$GLB,, | Performed by: INTERNAL MEDICINE

## 2024-06-17 PROCEDURE — 3078F DIAST BP <80 MM HG: CPT | Mod: CPTII,S$GLB,, | Performed by: INTERNAL MEDICINE

## 2024-06-17 RX ORDER — AMLODIPINE BESYLATE 5 MG/1
TABLET ORAL
COMMUNITY
Start: 2024-05-30

## 2024-06-17 NOTE — PROGRESS NOTES
Subjective:   Patient ID:  Dalia Peña is a 78 y.o. female who presents for evaluation of No chief complaint on file.      HPI  6.17.2024  Comes in for six-month follow-up.    Her blood pressure is improved.  She was recently added on HCTZ by PCP in January.    Her readings at home are somewhat higher than our reading today.  But still within acceptable range.    Denies chest pain or heaviness however she reports new dyspnea on exertion NYHA level 1-2.  No lower extremity swelling.          12.7.2023  Comes in for a six-month follow-up.    She had a event monitor after last visit showed 20% PVC.    She states that she was feeling them at that time however she states that this was due to the atorvastatin as she thinks that once she got off the medication she was feeling better.    She denies any palpitations syncope or presyncope   I felt her pulse for more than 2 minutes today without any syncope PVC.  She states that she has been feeling well lately.    Her blood pressure is slightly elevated and she states that this is same as her blood pressure at home.  She is also little anxious lately about her  getting worked up for possible esophageal tumor.    Reports that she was intolerant to the pravastatin so she stopped it.    8.2023  78-year-old female, with past medical history listed below.    Recently started on atorvastatin.  States that since then she is been feeling wobbly sometimes all of the sudden.    She was also noted to have bradycardia on her doctor's visit with a heart rate in the 40s however her EKG showed normal sinus rhythm in the 60s.    On exam she does have some PACs.    She denies any syncope or presyncope.    She states that she will follow with Dr. Murguia about her neuro complaints.    She wants to come off the atorvastatin as she claims to be the cause of her symptoms.  Denies any chest pain or dyspnea on exertion.    I reviewed her heart rate to her I watch recording which  ranged between 40 and 110.  Some of them in the morning between 8 and 9:00 a.m..    Past Medical History:   Diagnosis Date    Essential (primary) hypertension     Hyperlipemia        Past Surgical History:   Procedure Laterality Date    CATARACT EXTRACTION Right     none         Social History     Tobacco Use    Smoking status: Never    Smokeless tobacco: Never       No family history on file.    Review of Systems   Cardiovascular:  Negative for chest pain, dyspnea on exertion, palpitations and syncope.   Genitourinary: Negative.    Neurological: Negative.        Current Outpatient Medications on File Prior to Visit   Medication Sig    amLODIPine (NORVASC) 5 MG tablet     BOOSTRIX TDAP 2.5-8-5 Lf-mcg-Lf/0.5mL Syrg injection     busPIRone (BUSPAR) 5 MG Tab TAKE ONE TABLET BY MOUTH TWICE DAILY AS NEEDED FOR ANXIETY    hydroCHLOROthiazide (HYDRODIURIL) 12.5 MG Tab Take 1 tablet (12.5 mg total) by mouth once daily.    magnesium oxide (MAG-OX) 400 mg (241.3 mg magnesium) tablet Take 400 mg by mouth once daily.    metoprolol succinate (TOPROL-XL) 25 MG 24 hr tablet Take 1 tablet (25 mg total) by mouth once daily.    omeprazole (PRILOSEC) 20 MG capsule Take 20 mg by mouth once daily.    vitamin D (VITAMIN D3) 1000 units Tab Take 1,000 Units by mouth once daily.     No current facility-administered medications on file prior to visit.       Objective:   Objective:  Wt Readings from Last 3 Encounters:   06/17/24 79.8 kg (175 lb 14.8 oz)   03/26/24 80 kg (176 lb 5.9 oz)   02/01/24 79.7 kg (175 lb 11.3 oz)     Temp Readings from Last 3 Encounters:   03/26/24 97.6 °F (36.4 °C) (Oral)   01/10/24 97.7 °F (36.5 °C) (Oral)   10/24/23 98 °F (36.7 °C) (Oral)     BP Readings from Last 3 Encounters:   06/17/24 116/65   03/26/24 128/60   02/01/24 132/72     Pulse Readings from Last 3 Encounters:   06/17/24 80   03/26/24 67   02/01/24 65       Physical Exam  Vitals reviewed.   Constitutional:       Appearance: She is well-developed.  "  Neck:      Vascular: No carotid bruit.   Cardiovascular:      Rate and Rhythm: Normal rate and regular rhythm.      Pulses: Intact distal pulses.      Heart sounds: Normal heart sounds. No murmur heard.  Pulmonary:      Breath sounds: Normal breath sounds.   Neurological:      Mental Status: She is oriented to person, place, and time.       Lab Results   Component Value Date    CHOL 210 (H) 03/26/2024    CHOL 144 08/04/2023    CHOL 207 (H) 03/09/2023     Lab Results   Component Value Date    HDL 40 03/26/2024    HDL 48 08/04/2023    HDL 47 03/09/2023     Lab Results   Component Value Date    LDLCALC 119.8 03/26/2024    LDLCALC 73.2 08/04/2023    LDLCALC 123.4 03/09/2023     Lab Results   Component Value Date    TRIG 251 (H) 03/26/2024    TRIG 114 08/04/2023    TRIG 183 (H) 03/09/2023     Lab Results   Component Value Date    CHOLHDL 19.0 (L) 03/26/2024    CHOLHDL 33.3 08/04/2023    CHOLHDL 22.7 03/09/2023       Chemistry        Component Value Date/Time     03/18/2024 0958    K 4.5 03/18/2024 0958     03/18/2024 0958    CO2 27 03/18/2024 0958    BUN 19 03/18/2024 0958    CREATININE 0.8 03/18/2024 0958    GLU 96 03/18/2024 0958        Component Value Date/Time    CALCIUM 9.8 03/18/2024 0958    ALKPHOS 86 03/09/2023 1616    AST 19 03/09/2023 1616    ALT 14 08/04/2023 1007    BILITOT 0.3 03/09/2023 1616    ESTGFRAFRICA >60.0 02/24/2022 0900    EGFRNONAA >60.0 02/24/2022 0900          Lab Results   Component Value Date    TSH 0.883 03/09/2023     No results found for: "INR", "PROTIME"  Lab Results   Component Value Date    WBC 5.96 03/18/2024    HGB 13.8 03/18/2024    HCT 42.8 03/18/2024    MCV 96 03/18/2024     03/18/2024     BNP  @LABRCNTIP(BNP,BNPTRIAGEBLO)@  CrCl cannot be calculated (Patient's most recent lab result is older than the maximum 7 days allowed.).     Imaging:  ======    No results found for this or any previous visit.    No results found for this or any previous visit.    Results " for orders placed during the hospital encounter of 02/24/22    X-Ray Chest PA And Lateral    Narrative  EXAMINATION:  XR CHEST PA AND LATERAL    CLINICAL HISTORY:  Essential (primary) hypertension    TECHNIQUE:  PA and lateral views of the chest were performed.    COMPARISON:  None    FINDINGS:  Clear lungs.  No effusion.  Descending thoracic aorta is slightly tortuous.  Cardiomediastinal silhouette is not enlarged.  Degenerative changes of the spine are noted    Impression  No acute findings.      Electronically signed by: Primo Hernandez MD  Date:    02/24/2022  Time:    09:54    No results found for this or any previous visit.    No valid procedures specified.    No results found for this or any previous visit.      No results found for this or any previous visit.      No results found for this or any previous visit.      Diagnostic Results:  ECG: Reviewed  Conclusion         The predominant rhythm is sinus.    Patient Reported Event #1 Patient activated. The patient complained of 2 episodes. The symptom(s) included dizziness. The corresponding rhythm to the patient reported event was sinus tachycardia. These symptoms were associated with PVCs    Patient Reported Event #2 Patient activated. The patient complained of 1 episodes. The symptom(s) included shortness of breath. The corresponding rhythm to the patient reported event was sinus tachycardia. These symptoms were associated with PVCs.    Patient Reported Event #3 Patient activated. The patient complained of 1 episodes. The corresponding rhythm to the patient reported event was sinus tachycardia.    There were 70 runs and lasting to 7 beats. The rate varied between and 172 bpm    The patient presented symptoms corresponding with sinus tachycardia     The patient was monitored for a total of 14d 2h, underlying rhythm is Sinus.  The minimum heart rate was 55 bpm; the maximum 131 bpm; the average 84 bpm.  0 % of Atrial fibrillation/Atrial flutter with longest  episode of 0 ms.  The total burden of AV Block present was 0 % [Complete Heart Block: 0 %; Advanced (High Grade):  0 %; 2nd Degree, Mobitz II: 0 %; 2nd Degree, Mobitz I: 0 %].  There were 0 pauses, the longest pause was 0 ms at --.  Total count of Ventricular Tachycardia (VT): 0 episode(s). Longest VT: 0 s on --. Fastest VT: -- bpm on  --.  70 supraventricular episodes were found. Longest SVT Episode 7 beats, Fastest  bpm  There were a total of 64534 PVCs with 2 morphologies and 5 couplets. Overall PVC [place] at 3.11 %  There were a total of 0 Other Beats. There were 0 total number of paced beats.  There were a total of [id number] PSVCs with 2 morphologies and 5856 couplets. Overall PSVC [place] at  19.59 %  There is a total of 5 patient events.      The 10-year ASCVD risk score (Coby BAKER, et al., 2019) is: 22.9%    Values used to calculate the score:      Age: 78 years      Sex: Female      Is Non- : No      Diabetic: No      Tobacco smoker: No      Systolic Blood Pressure: 116 mmHg      Is BP treated: Yes      HDL Cholesterol: 40 mg/dL      Total Cholesterol: 210 mg/dL        Assessment and Plan:       FORREST (dyspnea on exertion)  -     Stress Echo Which stress agent will be used? Treadmill Exercise; Color Flow Doppler? No; Future    Abnormal ECG  -     Stress Echo Which stress agent will be used? Treadmill Exercise; Color Flow Doppler? No; Future    Essential hypertension    PVC (premature ventricular contraction)    Hyperlipidemia, unspecified hyperlipidemia type            Reviewed all tests and above medical conditions with patient in detail and formulated treatment plan.  Risk factor modification discussed.   Cardiac low salt diet discussed.  Maintaining healthy weight and weight loss goals were discussed in clinic.  Continue with Toprol, HCTZ and amlodipine.  Inferior q pattern , dyspnea on exertion new, we will get a stress echo.  Follow up in  6 months

## 2024-06-21 ENCOUNTER — OFFICE VISIT (OUTPATIENT)
Dept: PRIMARY CARE CLINIC | Facility: CLINIC | Age: 79
End: 2024-06-21
Payer: MEDICARE

## 2024-06-21 VITALS
BODY MASS INDEX: 28.54 KG/M2 | HEART RATE: 70 BPM | DIASTOLIC BLOOD PRESSURE: 58 MMHG | HEIGHT: 66 IN | SYSTOLIC BLOOD PRESSURE: 122 MMHG | OXYGEN SATURATION: 97 % | TEMPERATURE: 97 F | WEIGHT: 177.56 LBS

## 2024-06-21 DIAGNOSIS — Z12.31 ENCOUNTER FOR SCREENING MAMMOGRAM FOR MALIGNANT NEOPLASM OF BREAST: ICD-10-CM

## 2024-06-21 DIAGNOSIS — I10 ESSENTIAL HYPERTENSION: ICD-10-CM

## 2024-06-21 DIAGNOSIS — I49.3 SYMPTOMATIC PVCS: ICD-10-CM

## 2024-06-21 DIAGNOSIS — Z00.00 ENCOUNTER FOR PREVENTIVE HEALTH EXAMINATION: Primary | ICD-10-CM

## 2024-06-21 DIAGNOSIS — M85.80 OSTEOPENIA WITH HIGH RISK OF FRACTURE: ICD-10-CM

## 2024-06-21 DIAGNOSIS — F41.8 SITUATIONAL ANXIETY: ICD-10-CM

## 2024-06-21 DIAGNOSIS — Z91.81 HISTORY OF FALLING: ICD-10-CM

## 2024-06-21 DIAGNOSIS — G47.33 OBSTRUCTIVE SLEEP APNEA: ICD-10-CM

## 2024-06-21 DIAGNOSIS — E78.49 OTHER HYPERLIPIDEMIA: ICD-10-CM

## 2024-06-21 PROBLEM — R30.0 DYSURIA: Status: RESOLVED | Noted: 2023-06-26 | Resolved: 2024-06-21

## 2024-06-21 PROCEDURE — 99999 PR PBB SHADOW E&M-EST. PATIENT-LVL III: CPT | Mod: PBBFAC,,, | Performed by: NURSE PRACTITIONER

## 2024-06-21 NOTE — PROGRESS NOTES
"  Dalia Peña presented for a follow-up Medicare AWV today. The following components were reviewed and updated:    Medical history  Family History  Social history  Allergies and Current Medications  Health Risk Assessment  Health Maintenance  Care Team    **See Completed Assessments for Annual Wellness visit with in the encounter summary    The following assessments were completed:  Depression Screening  Cognitive function Screening  Timed Get Up Test  Whisper Test  Nutrition Screening    Vitals:    06/21/24 1009   BP: (!) 122/58   BP Location: Right arm   Pulse: 70   Temp: 96.9 °F (36.1 °C)   TempSrc: Temporal   SpO2: 97%   Weight: 80.6 kg (177 lb 9.3 oz)   Height: 5' 6" (1.676 m)     Body mass index is 28.66 kg/m².            Review of Systems   Constitutional:  Negative for chills and fever.   HENT:  Positive for postnasal drip. Negative for congestion and rhinorrhea.    Eyes:  Negative for visual disturbance.   Respiratory:  Positive for cough.    Cardiovascular:  Positive for leg swelling. Negative for chest pain.   Gastrointestinal:  Positive for abdominal pain. Negative for constipation, nausea and vomiting.   Genitourinary:  Negative for dysuria and frequency.   Musculoskeletal: Negative.    Neurological:  Negative for dizziness and light-headedness.   Psychiatric/Behavioral:  Positive for sleep disturbance. Negative for confusion.            Physical Exam  Vitals reviewed.   Constitutional:       General: She is not in acute distress.     Appearance: Normal appearance.   HENT:      Head: Normocephalic and atraumatic.      Nose: Nose normal.      Mouth/Throat:      Mouth: Mucous membranes are moist.   Eyes:      General: No scleral icterus.     Conjunctiva/sclera: Conjunctivae normal.   Cardiovascular:      Rate and Rhythm: Normal rate and regular rhythm.      Heart sounds: No murmur heard.  Pulmonary:      Effort: Pulmonary effort is normal. No respiratory distress.      Breath sounds: Normal breath " sounds.   Abdominal:      Palpations: Abdomen is soft.      Tenderness: There is no abdominal tenderness.   Musculoskeletal:         General: No swelling or deformity. Normal range of motion.      Cervical back: Normal range of motion and neck supple.      Right lower leg: No edema.      Left lower leg: No edema.   Lymphadenopathy:      Cervical: No cervical adenopathy.   Skin:     General: Skin is warm and dry.   Neurological:      Mental Status: She is alert and oriented to person, place, and time. Mental status is at baseline.      Motor: No weakness.   Psychiatric:         Mood and Affect: Mood normal.         Thought Content: Thought content normal.            Health Maintenance         Date Due Completion Date    RSV Vaccine (Age 60+ and Pregnant patients) (1 - 1-dose 60+ series) Never done ---    COVID-19 Vaccine (3 - 2023-24 season) 09/01/2023 5/26/2021    DEXA Scan 09/23/2025 9/23/2022    Lipid Panel 03/26/2029 3/26/2024    TETANUS VACCINE 03/10/2033 3/10/2023              PROBLEM LIST ITEMS ADDRESSED THIS VISIT:    1. Encounter for preventive health examination  Assessment & Plan:  2024 - MMG    Review for Opioid Screening: Pt does not have Rx for Opioids      Review for Substance Use Disorders: Patient does not use illicit substances as reported        2. Situational anxiety  Assessment & Plan:  Chronic, stable  Continue current tx plan - Buspar prn  Continue CBT  F/U with PCP       3. Symptomatic PVCs  Assessment & Plan:  Chronic, stable  Continue current tx plan - Toprol XL 25 mg daily  F/U with PCP/Cardiology      4. Other hyperlipidemia  Assessment & Plan:     Lab Results   Component Value Date    CHOL 210 (H) 03/26/2024    HDL 40 03/26/2024    LDLCALC 119.8 03/26/2024    TRIG 251 (H) 03/26/2024     Chronic, stable  No on STATIN currently  Candidate for PCSK9 inhibitor?  F/U PCP       5. Essential hypertension  Assessment & Plan:  Chronic, stable  Continue current tx plan - HCTZ, amlodipine 5 mg daily  and Toprol XL  F/U with PCP       6. Osteopenia with high risk of fracture  Overview:  FINDINGS:  The L1 to L4 vertebral bone mineral density is equal to 1.202 g/cm squared with a T score of 0.1.     The left femoral neck bone mineral density is equal to 0.806 g/cm squared with a T score of -1.7.   There is a 12.8% risk of a major osteoporotic fracture and a 3% risk of hip fracture in the next 10 years (FRAX).   Impression:   Osteopenia    Assessment & Plan:  Assess and monitor  Encouraged weight bearing exercise  Vitamin D supplement  F/U with PcP      7. Obstructive sleep apnea  Overview:  5/15/2024, 5/16/2024 Home Sleep Study  2 nights Moderate sleep disordered breathing (AHI=17).   5/21/2024 order Auto CPAP 5-20 cm with nasal mask or mask of choice  Follow up 31-90 days from obtaining PAP therapy for IDL, patient request [place] location follow up in morning.       Assessment & Plan:  Chronic, stable  Continue current tx plan - nightly CPAP  F/U with PCP       8. Encounter for screening mammogram for malignant neoplasm of breast  -     Mammo Digital Screening Bilat w/ Andrew; Future; Expected date: 06/21/2024    9. History of falling          Provided Dalia with a 5-10 year written screening schedule and personal prevention plan. Recommendations were developed using the USPSTF age appropriate recommendations. Education, counseling, and referrals were provided as needed.  After Visit Summary printed and given to patient which includes a list of additional screenings\tests needed.    Future Appointments   Date Time Provider Department Center   7/2/2024  8:45 AM CARDIOVASCULAR, [facility] SPECCPR [facility]   7/10/2024  9:00 AM Mindy Street LCSW [facility]   7/31/2024  8:30 AM Radha Leavitt PA-C [facility] PULMSVC [facility]   8/28/2024  1:00 PM Giselle Murguia MD [facility]   1/9/2025  9:40 AM Vianney Bui MD [facility] CARDIO [facility]          Santa Betts, APRN, NP-C  [facility]  [address] [place] [address]      I offered to discuss advanced care planning, including how to pick a person who would make decisions for you if you were unable to make them for yourself, called a health care power of , and what kind of decisions you might make such as use of life sustaining treatments such as ventilators and tube feeding when faced with a life limiting illness recorded on a living will that they will need to know. (How you want to be cared for as you near the end of your natural life)     X Patient is interested in learning more about how to make advanced directives.  I provided them paperwork and offered to discuss this with them.

## 2024-06-21 NOTE — ASSESSMENT & PLAN NOTE
2024 - G    Review for Opioid Screening: Pt does not have Rx for Opioids      Review for Substance Use Disorders: Patient does not use illicit substances as reported

## 2024-06-21 NOTE — ASSESSMENT & PLAN NOTE
Chronic, stable  Continue current tx plan - HCTZ, amlodipine 5 mg daily and Toprol XL  F/U with PCP

## 2024-06-21 NOTE — PATIENT INSTRUCTIONS
Counseling and Referral of Other Preventative  (Italic type indicates deductible and co-insurance are waived)    Patient Name: Dalia Peña  Today's Date: 6/21/2024    Health Maintenance       Date Due Completion Date    RSV Vaccine (Age 60+ and Pregnant patients) (1 - 1-dose 60+ series) Never done ---    COVID-19 Vaccine (3 - 2023-24 season) 09/01/2023 5/26/2021    DEXA Scan 09/23/2025 9/23/2022    Lipid Panel 03/26/2029 3/26/2024    TETANUS VACCINE 03/10/2033 3/10/2023        Orders Placed This Encounter   Procedures    Mammo Digital Screening Bilat w/ Andrew     The following information is provided to all patients.  This information is to help you find resources for any of the problems found today that may be affecting your health:                  Living healthy guide: www.[facility].[place].gov      Understanding Diabetes: www.diabetes.org      Eating healthy: www.cdc.gov/healthyweight      CDC home safety checklist: www.cdc.gov/steadi/patient.html      Agency on Aging: www.goea.[place].[facility]      Alcoholics anonymous (AA): www.aa.org      Physical Activity: www.david.nih.gov/xe7etml      Tobacco use: www.quitwithusla.org

## 2024-06-21 NOTE — ASSESSMENT & PLAN NOTE
Lab Results   Component Value Date    CHOL 210 (H) 03/26/2024    HDL 40 03/26/2024    LDLCALC 119.8 03/26/2024    TRIG 251 (H) 03/26/2024     Chronic, stable  No on STATIN currently  Candidate for PCSK9 inhibitor?  F/U PCP

## 2024-07-02 ENCOUNTER — HOSPITAL ENCOUNTER (OUTPATIENT)
Dept: CARDIOLOGY | Facility: HOSPITAL | Age: 79
Discharge: HOME | End: 2024-07-02
Attending: INTERNAL MEDICINE
Payer: MEDICARE

## 2024-07-02 VITALS
SYSTOLIC BLOOD PRESSURE: 130 MMHG | BODY MASS INDEX: 28.45 KG/M2 | WEIGHT: 177 LBS | HEIGHT: 66 IN | DIASTOLIC BLOOD PRESSURE: 71 MMHG

## 2024-07-02 DIAGNOSIS — R06.09 DOE (DYSPNEA ON EXERTION): ICD-10-CM

## 2024-07-02 DIAGNOSIS — R94.31 ABNORMAL ECG: ICD-10-CM

## 2024-07-02 LAB
AORTIC ROOT ANNULUS: 2.72 CM
ASCENDING AORTA: 2.92 CM
AV INDEX (PROSTH): 0.78
AV MEAN GRADIENT: 5 MMHG
AV PEAK GRADIENT: 8 MMHG
AV REGURGITATION PRESSURE HALF TIME: 441.64 MS
AV VALVE AREA BY VELOCITY RATIO: 2.2 CM²
AV VALVE AREA: 2.26 CM²
AV VELOCITY RATIO: 0.76
BSA FOR ECHO PROCEDURE: 1.93 M2
CV ECHO LV RWT: 0.44 CM
CV STRESS BASE HR: 64 BPM
DIASTOLIC BLOOD PRESSURE: 71 MMHG
DOP CALC AO PEAK VEL: 1.37 M/S
DOP CALC AO VTI: 32.6 CM
DOP CALC LVOT AREA: 2.9 CM2
DOP CALC LVOT DIAMETER: 1.92 CM
DOP CALC LVOT PEAK VEL: 1.04 M/S
DOP CALC LVOT STROKE VOLUME: 73.79 CM3
DOP CALC RVOT PEAK VEL: 0.6 M/S
DOP CALC RVOT VTI: 12.6 CM
DOP CALCLVOT PEAK VEL VTI: 25.5 CM
E WAVE DECELERATION TIME: 210.31 MSEC
E/A RATIO: 0.66
E/E' RATIO: 8.82 M/S
ECHO LV POSTERIOR WALL: 1.06 CM (ref 0.6–1.1)
EJECTION FRACTION: 60 %
FRACTIONAL SHORTENING: 36 % (ref 28–44)
INTERVENTRICULAR SEPTUM: 1.08 CM (ref 0.6–1.1)
IVC DIAMETER: 1.64 CM
IVRT: 68.51 MSEC
LA MAJOR: 4.72 CM
LA MINOR: 5.71 CM
LA WIDTH: 3.8 CM
LEFT ATRIUM AREA SYSTOLIC (APICAL 2 CHAMBER): 18.3 CM2
LEFT ATRIUM AREA SYSTOLIC (APICAL 4 CHAMBER): 13.18 CM2
LEFT ATRIUM SIZE: 4.02 CM
LEFT ATRIUM VOLUME INDEX MOD: 22.4 ML/M2
LEFT ATRIUM VOLUME INDEX: 35.3 ML/M2
LEFT ATRIUM VOLUME MOD: 42.51 CM3
LEFT ATRIUM VOLUME: 67.1 CM3
LEFT INTERNAL DIMENSION IN SYSTOLE: 3.1 CM (ref 2.1–4)
LEFT VENTRICLE DIASTOLIC VOLUME INDEX: 57.24 ML/M2
LEFT VENTRICLE DIASTOLIC VOLUME: 108.76 ML
LEFT VENTRICLE END SYSTOLIC VOLUME APICAL 2 CHAMBER: 45.21 ML
LEFT VENTRICLE END SYSTOLIC VOLUME APICAL 4 CHAMBER: 31.61 ML
LEFT VENTRICLE MASS INDEX: 99 G/M2
LEFT VENTRICLE SYSTOLIC VOLUME INDEX: 20 ML/M2
LEFT VENTRICLE SYSTOLIC VOLUME: 38.02 ML
LEFT VENTRICULAR INTERNAL DIMENSION IN DIASTOLE: 4.82 CM (ref 3.5–6)
LEFT VENTRICULAR MASS: 187.94 G
LV LATERAL E/E' RATIO: 7.5 M/S
LV SEPTAL E/E' RATIO: 10.71 M/S
LVED V (TEICH): 108.76 ML
LVES V (TEICH): 38.02 ML
LVOT MG: 2.49 MMHG
LVOT MV: 0.75 CM/S
MV PEAK A VEL: 1.13 M/S
MV PEAK E VEL: 0.75 M/S
MV STENOSIS PRESSURE HALF TIME: 60.99 MS
MV VALVE AREA P 1/2 METHOD: 3.61 CM2
OHS CV CPX 1 MINUTE RECOVERY HEART RATE: 113 BPM
OHS CV CPX 85 PERCENT MAX PREDICTED HEART RATE MALE: 121
OHS CV CPX ESTIMATED METS: 6
OHS CV CPX MAX PREDICTED HEART RATE: 142
OHS CV CPX PATIENT IS FEMALE: 1
OHS CV CPX PATIENT IS MALE: 0
OHS CV CPX PEAK DIASTOLIC BLOOD PRESSURE: 73 MMHG
OHS CV CPX PEAK HEAR RATE: 136 BPM
OHS CV CPX PEAK RATE PRESSURE PRODUCT: NORMAL
OHS CV CPX PEAK SYSTOLIC BLOOD PRESSURE: 183 MMHG
OHS CV CPX PERCENT MAX PREDICTED HEART RATE ACHIEVED: 99
OHS CV CPX RATE PRESSURE PRODUCT PRESENTING: 8320
OHS CV RV/LV RATIO: 0.77 CM
PISA AR MAX VEL: 2.84 M/S
PISA TR MAX VEL: 2.67 M/S
PV MEAN GRADIENT: 1 MMHG
PV MV: 0.62 M/S
PV PEAK GRADIENT: 4 MMHG
PV PEAK VELOCITY: 0.99 M/S
RA MAJOR: 4.41 CM
RA PRESSURE ESTIMATED: 3 MMHG
RA WIDTH: 2.63 CM
RIGHT VENTRICULAR END-DIASTOLIC DIMENSION: 3.73 CM
RV TB RVSP: 6 MMHG
RV TISSUE DOPPLER FREE WALL SYSTOLIC VELOCITY 1 (APICAL 4 CHAMBER VIEW): 15.42 CM/S
SINUS: 3.07 CM
STJ: 2.3 CM
STRESS ECHO POST EXERCISE DUR MIN: 4 MINUTES
STRESS ECHO POST EXERCISE DUR SEC: 4 SECONDS
SYSTOLIC BLOOD PRESSURE: 130 MMHG
TDI LATERAL: 0.1 M/S
TDI SEPTAL: 0.07 M/S
TDI: 0.09 M/S
TR MAX PG: 29 MMHG
TRICUSPID ANNULAR PLANE SYSTOLIC EXCURSION: 2.42 CM
TV REST PULMONARY ARTERY PRESSURE: 32 MMHG
Z-SCORE OF LEFT VENTRICULAR DIMENSION IN END DIASTOLE: -1.01
Z-SCORE OF LEFT VENTRICULAR DIMENSION IN END SYSTOLE: -0.46

## 2024-07-02 PROCEDURE — 93351 STRESS TTE COMPLETE: CPT

## 2024-07-02 PROCEDURE — 93320 DOPPLER ECHO COMPLETE: CPT | Mod: 26,,, | Performed by: INTERNAL MEDICINE

## 2024-07-02 PROCEDURE — 93325 DOPPLER ECHO COLOR FLOW MAPG: CPT | Mod: 26,,, | Performed by: INTERNAL MEDICINE

## 2024-07-02 PROCEDURE — 93351 STRESS TTE COMPLETE: CPT | Mod: 26,,, | Performed by: INTERNAL MEDICINE

## 2024-07-03 ENCOUNTER — PATIENT MESSAGE (OUTPATIENT)
Dept: PRIMARY CARE CLINIC | Facility: CLINIC | Age: 79
End: 2024-07-03
Payer: MEDICARE

## 2024-07-03 ENCOUNTER — TELEPHONE (OUTPATIENT)
Dept: PRIMARY CARE CLINIC | Facility: CLINIC | Age: 79
End: 2024-07-03
Payer: MEDICARE

## 2024-07-03 DIAGNOSIS — R30.0 DYSURIA: Primary | ICD-10-CM

## 2024-07-03 DIAGNOSIS — N30.00 ACUTE CYSTITIS WITHOUT HEMATURIA: ICD-10-CM

## 2024-07-03 RX ORDER — NITROFURANTOIN 25; 75 MG/1; MG/1
100 CAPSULE ORAL 2 TIMES DAILY
Qty: 10 CAPSULE | Refills: 0 | Status: SHIPPED | OUTPATIENT
Start: 2024-07-03 | End: 2024-07-08

## 2024-07-03 NOTE — TELEPHONE ENCOUNTER
Please tell pt I sent Rx to [facility], but I need a urine sample before she starts rx.  Sched where she wants.  SM

## 2024-07-08 ENCOUNTER — TELEPHONE (OUTPATIENT)
Dept: PRIMARY CARE CLINIC | Facility: CLINIC | Age: 79
End: 2024-07-08
Payer: MEDICARE

## 2024-07-08 NOTE — TELEPHONE ENCOUNTER
Received a call from patient; she is currently at home caring for her  who is very ill. She cancelled her LCSW appt on July 10th; she will call back to reschedule when she feels able to leave her  safely at home.

## 2024-07-18 ENCOUNTER — TELEPHONE (OUTPATIENT)
Dept: PRIMARY CARE CLINIC | Facility: CLINIC | Age: 79
End: 2024-07-18
Payer: MEDICARE

## 2024-07-18 NOTE — TELEPHONE ENCOUNTER
----- Message from Maya Santo sent at 7/18/2024  3:35 PM CDT -----  Contact: [phone]  [email or web address]     Patient is calling for Medical Advice regarding:possible UTI    How long has patient had these symptoms: on going    Pharmacy name and phone#:   [id number] - [place] - [address]  [address], [facility] [address]  Phone: [phone] Fax: [phone]      Patient wants a call back or thru [facility]: call    Comments: patient has finish medication and patient has bo'd labs at the [facility]    Please advise patient replies from provider may take up to 48 hours.

## 2024-07-19 ENCOUNTER — PATIENT MESSAGE (OUTPATIENT)
Dept: PRIMARY CARE CLINIC | Facility: CLINIC | Age: 79
End: 2024-07-19
Payer: MEDICARE

## 2024-07-19 ENCOUNTER — LAB VISIT (OUTPATIENT)
Dept: LAB | Facility: HOSPITAL | Age: 79
End: 2024-07-19
Payer: MEDICARE

## 2024-07-19 DIAGNOSIS — R30.0 DYSURIA: ICD-10-CM

## 2024-07-19 LAB
BACTERIA #/AREA URNS HPF: ABNORMAL /HPF
BILIRUB UR QL STRIP: NEGATIVE
CLARITY UR: CLEAR
COLOR UR: YELLOW
GLUCOSE UR QL STRIP: NEGATIVE
HGB UR QL STRIP: ABNORMAL
KETONES UR QL STRIP: NEGATIVE
LEUKOCYTE ESTERASE UR QL STRIP: ABNORMAL
MICROSCOPIC COMMENT: ABNORMAL
NITRITE UR QL STRIP: NEGATIVE
PH UR STRIP: 7 [PH] (ref 5–8)
PROT UR QL STRIP: ABNORMAL
RBC #/AREA URNS HPF: 3 /HPF (ref 0–4)
SP GR UR STRIP: 1.01 (ref 1–1.03)
URN SPEC COLLECT METH UR: ABNORMAL
WBC #/AREA URNS HPF: 10 /HPF (ref 0–5)

## 2024-07-19 PROCEDURE — 81000 URINALYSIS NONAUTO W/SCOPE: CPT | Performed by: INTERNAL MEDICINE

## 2024-07-19 RX ORDER — CIPROFLOXACIN 250 MG/1
250 TABLET, FILM COATED ORAL 2 TIMES DAILY
Qty: 10 TABLET | Refills: 0 | Status: SHIPPED | OUTPATIENT
Start: 2024-07-19 | End: 2024-07-24

## 2024-07-22 ENCOUNTER — TELEPHONE (OUTPATIENT)
Dept: PRIMARY CARE CLINIC | Facility: CLINIC | Age: 79
End: 2024-07-22
Payer: MEDICARE

## 2024-07-22 NOTE — TELEPHONE ENCOUNTER
Spoke with patient concerning phone call, patient stated she has been on two full days of Cipro and symptoms have been better. If she has any other concerns she will reach out to staff.

## 2024-07-22 NOTE — TELEPHONE ENCOUNTER
----- Message from Bruno Martin MA sent at 7/19/2024  5:12 PM CDT -----  Sara Kauffman Staff  Caller: [phone] (Today,  4:40 PM)  2TESTRESULTS    Type: Test Results    What test was performed?urine    Who ordered the test?dr. Giselle Warren    When and where were the test performed? [facility] 7/19/24    Would you like a call back and or thru [facility]:callback

## 2024-07-25 ENCOUNTER — PATIENT MESSAGE (OUTPATIENT)
Dept: PRIMARY CARE CLINIC | Facility: CLINIC | Age: 79
End: 2024-07-25
Payer: MEDICARE

## 2024-07-31 ENCOUNTER — OFFICE VISIT (OUTPATIENT)
Dept: PULMONOLOGY | Facility: CLINIC | Age: 79
End: 2024-07-31
Payer: MEDICARE

## 2024-07-31 VITALS
HEIGHT: 66 IN | OXYGEN SATURATION: 96 % | BODY MASS INDEX: 28.38 KG/M2 | SYSTOLIC BLOOD PRESSURE: 120 MMHG | WEIGHT: 176.56 LBS | DIASTOLIC BLOOD PRESSURE: 66 MMHG | RESPIRATION RATE: 18 BRPM | HEART RATE: 63 BPM

## 2024-07-31 DIAGNOSIS — G47.33 OBSTRUCTIVE SLEEP APNEA: Primary | ICD-10-CM

## 2024-07-31 DIAGNOSIS — F51.04 PSYCHOPHYSIOLOGICAL INSOMNIA: ICD-10-CM

## 2024-07-31 PROCEDURE — 1160F RVW MEDS BY RX/DR IN RCRD: CPT | Mod: CPTII,S$GLB,, | Performed by: HOSPITALIST

## 2024-07-31 PROCEDURE — 99999 PR PBB SHADOW E&M-EST. PATIENT-LVL IV: CPT | Mod: PBBFAC,,, | Performed by: HOSPITALIST

## 2024-07-31 PROCEDURE — 1126F AMNT PAIN NOTED NONE PRSNT: CPT | Mod: CPTII,S$GLB,, | Performed by: HOSPITALIST

## 2024-07-31 PROCEDURE — 1159F MED LIST DOCD IN RCRD: CPT | Mod: CPTII,S$GLB,, | Performed by: HOSPITALIST

## 2024-07-31 PROCEDURE — 3074F SYST BP LT 130 MM HG: CPT | Mod: CPTII,S$GLB,, | Performed by: HOSPITALIST

## 2024-07-31 PROCEDURE — 99213 OFFICE O/P EST LOW 20 MIN: CPT | Mod: S$GLB,,, | Performed by: HOSPITALIST

## 2024-07-31 PROCEDURE — 3078F DIAST BP <80 MM HG: CPT | Mod: CPTII,S$GLB,, | Performed by: HOSPITALIST

## 2024-07-31 RX ORDER — FLAXSEED OIL 1000 MG
CAPSULE ORAL
COMMUNITY

## 2024-07-31 NOTE — PROGRESS NOTES
"Subjective:      Patient ID: Dalia Peña is a 78 y.o. female.    Chief Complaint: JESUS    HPI 7/31/24:    78 year old female with history of HTN, HLD, JESUS who presents to Pulmonary clinic for initial cpap compliance review. She was last seen 2/2024 by Jessica Bajwa NP when HST was ordered which showed moderate sleep apnea. CPAP was ordered.     Mrs. 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 In regards to the mask itself, she reports no issues. The pressures for the CPAP were initially too high and range changed to 4-8cmH2O. In regards to her insomnia- has addressed sleep hygiene, she has used the Calm steven, does not drink caffeine, and has taken other measures and will still at times have difficulty falling asleep. She does enjoy taking a 45 minute nap on weekdays when her son is in school.    Pertinent Work Up:  2 night HST 5/2024- AHI 17, SpO2 cecilia 81%- positional with non-supine AHI 2        Review of Systems   Respiratory:  Positive for snoring and somnolence.      Objective:     Physical Exam   Constitutional: She is oriented to person, place, and time. She appears well-developed and well-nourished.   Cardiovascular: Normal rate, normal heart sounds and intact distal pulses.   Pulmonary/Chest: Normal expansion, effort normal and breath sounds normal.   Neurological: She is alert and oriented to person, place, and time.   Skin: Skin is warm and dry.     Personal Diagnostic Review  As Above      7/2/2024     9:45 AM 6/21/2024    10:09 AM 6/17/2024    11:39 AM 3/26/2024    11:05 AM 2/1/2024    10:54 AM 1/10/2024     9:37 AM 12/7/2023    11:23 AM   Pulmonary Function Tests   SpO2  97 % 95 % 97 % 95 % 96 %    Height 5' 6" (1.676 m) 5' 6" (1.676 m) 5' 6" (1.676 m) 5' " "6" (1.676 m) 5' 6" (1.676 m) 5' 6" (1.676 m)    Weight 80.3 kg (177 lb) 80.6 kg (177 lb 9.3 oz) 79.8 kg (175 lb 14.8 oz) 80 kg (176 lb 5.9 oz) 79.7 kg (175 lb 11.3 oz) 80.1 kg (176 lb 9.4 oz) 80 kg (176 lb 5.9 oz)   BMI (Calculated) 28.6 28.7 28.4 28.5 28.4 28.5         Assessment:     No diagnosis found.     Outpatient Encounter Medications as of 2024   Medication Sig Dispense Refill    amLODIPine (NORVASC) 5 MG tablet       busPIRone (BUSPAR) 5 MG Tab TAKE ONE TABLET BY MOUTH TWICE DAILY AS NEEDED FOR ANXIETY 60 tablet 3    [] ciprofloxacin HCl (CIPRO) 250 MG tablet Take 1 tablet (250 mg total) by mouth 2 (two) times daily. for 5 days 10 tablet 0    hydroCHLOROthiazide (HYDRODIURIL) 12.5 MG Tab Take 1 tablet (12.5 mg total) by mouth once daily. 90 tablet 3    magnesium oxide (MAG-OX) 400 mg (241.3 mg magnesium) tablet Take 400 mg by mouth once daily.      metoprolol succinate (TOPROL-XL) 25 MG 24 hr tablet Take 1 tablet (25 mg total) by mouth once daily. 30 tablet 11    [] nitrofurantoin, macrocrystal-monohydrate, (MACROBID) 100 MG capsule Take 1 capsule (100 mg total) by mouth 2 (two) times daily. For bladder infection for 5 days 10 capsule 0    omeprazole (PRILOSEC) 20 MG capsule Take 20 mg by mouth once daily.      vitamin D (VITAMIN D3) 1000 units Tab Take 1,000 Units by mouth once daily.      [DISCONTINUED] BOOSTRIX TDAP 2.5-8-5 Lf-mcg-Lf/0.5mL Syrg injection  (Patient not taking: Reported on 2024)       No facility-administered encounter medications on file as of 2024.     No orders of the defined types were placed in this encounter.      Plan:       Problem List Items Addressed This Visit          Other    Insomnia     - chronic  - has addressed sleep hygiene   - she has been limiting her naps to 45 minutes  - prefers to not use sleep medications that could have a lingering drowsiness         Obstructive sleep apnea - Primary     - reviewed sleep study with patient- AHI >40 when " supine, 2 when non-supine  - pt has been compliant, struggling more recently secondary to life stressors  - discussed positional therapy options when pt not able to use  - information provided for local dentist for MAS            Plan discussed and she expressed understanding, all questions answered. RTC 6 months or sooner as needed.

## 2024-07-31 NOTE — PATIENT INSTRUCTIONS
[facility] Kurt A. LeJeune D.S.S.  [address] [place] [address]  Phone:[phone]

## 2024-07-31 NOTE — ASSESSMENT & PLAN NOTE
- chronic  - has addressed sleep hygiene   - she has been limiting her naps to 45 minutes  - prefers to not use sleep medications that could have a lingering drowsiness

## 2024-07-31 NOTE — ASSESSMENT & PLAN NOTE
- reviewed sleep study with patient- AHI >40 when supine, 2 when non-supine  - pt has been compliant, struggling more recently secondary to life stressors  - discussed positional therapy options when pt not able to use  - information provided for local dentist for MAS

## 2024-08-23 NOTE — PROGRESS NOTES
"Subjective:      Patient ID: Dalia Peña is a 79 y.o. female.    Chief Complaint: Follow-up (5 month f/u ) and Foot Swelling      HPI  Here for follow up of medical problems.  BP at home 130/60 consistently.  Constantly cares for CA  with chronic diarrhea and lots care needed, his diagnosis is unclear.  Cares for 11yo grandson, has legally adopted him.  Lots anxiety.  Feels very sad and despairing.  BMs slow, even on Sofie.    Updated/ annual due 3/25:  HM:  11/22 fluvax, 5/21 covid vaccines, 2/17 [id number], 11/18 [id number], 3/23 Tdap, 2022 Shingrix x2, 5/23 MMG, 9/22 BMD rep 2y, Cscope at 71yo rep 10y, 7/24 stress ECHO negative.      Review of Systems   Constitutional:  Negative for chills, diaphoresis and fever.   Respiratory:  Negative for cough and shortness of breath.    Cardiovascular:  Negative for chest pain, palpitations and leg swelling.   Gastrointestinal:  Negative for blood in stool, constipation, diarrhea, nausea and vomiting.   Genitourinary:  Negative for dysuria, frequency and hematuria.   Psychiatric/Behavioral:  The patient is not nervous/anxious.          Objective:   /60 (BP Location: Right arm, Patient Position: Sitting)   Pulse 68   Temp 97.4 °F (36.3 °C) (Skin)   Ht 5' 6" (1.676 m)   Wt 80.7 kg (177 lb 14.6 oz)   SpO2 95%   BMI 28.72 kg/m²     Physical Exam  Constitutional:       Appearance: She is well-developed.   Neck:      Thyroid: No thyroid mass.      Vascular: No carotid bruit.   Cardiovascular:      Rate and Rhythm: Normal rate and regular rhythm.      Heart sounds: No murmur heard.     No friction rub. No gallop.   Pulmonary:      Effort: Pulmonary effort is normal.      Breath sounds: Normal breath sounds. No wheezing or rales.   Abdominal:      General: Bowel sounds are normal.      Palpations: Abdomen is soft. There is no mass.      Tenderness: There is no abdominal tenderness.   Musculoskeletal:      Cervical back: Neck supple.   Lymphadenopathy:      " Cervical: No cervical adenopathy.   Neurological:      Mental Status: She is alert and oriented to person, place, and time.           Assessment:       1. Essential hypertension    2. Situational anxiety    3. Asymptomatic postmenopausal state    4. Encounter for screening mammogram for malignant neoplasm of breast    5. Other hyperlipidemia    6. JESUS on CPAP    7. Current moderate episode of major depressive disorder without prior episode    8. Statin intolerance          Plan:     1. Essential hypertension- stable, cont rx.  Overview:  Amlod 5mg, metoprolol XL 25mg, hctz 12.5mg daily.      2. Situational anxiety- cont buspar prn.  Overview:  Buspar prn.      3. Asymptomatic postmenopausal state  -     DXA Bone Density Axial Skeleton 1 or more sites; Future; Expected date: 08/28/2024    4. Encounter for screening mammogram for malignant neoplasm of breast  -     Mammo Digital Screening Bilat w/ Andrew; Future; Expected date: 08/28/2024    5. Other hyperlipidemia, Statin intolerance    6. JESUS on CPAP- doing well, try trazodone to help with sleep.  Overview:  2 night HST 5/2024- AHI 17, SpO2 cecilia 81%- positional with non-supine AHI 2    7. Current moderate episode of major depressive disorder without prior episode- RTC 1mo.  -     sertraline (ZOLOFT) 50 MG tablet; Take 1 tablet (50 mg total) by mouth once daily.  Dispense: 30 tablet; Refill: 11     HAV, fluvax, RSV at pharmacy.

## 2024-08-28 ENCOUNTER — OFFICE VISIT (OUTPATIENT)
Dept: PRIMARY CARE CLINIC | Facility: CLINIC | Age: 79
End: 2024-08-28
Payer: MEDICARE

## 2024-08-28 VITALS
TEMPERATURE: 97 F | HEIGHT: 66 IN | HEART RATE: 68 BPM | SYSTOLIC BLOOD PRESSURE: 132 MMHG | DIASTOLIC BLOOD PRESSURE: 60 MMHG | WEIGHT: 177.94 LBS | BODY MASS INDEX: 28.6 KG/M2 | OXYGEN SATURATION: 95 %

## 2024-08-28 DIAGNOSIS — F51.01 PRIMARY INSOMNIA: ICD-10-CM

## 2024-08-28 DIAGNOSIS — Z78.9 STATIN INTOLERANCE: ICD-10-CM

## 2024-08-28 DIAGNOSIS — F32.1 CURRENT MODERATE EPISODE OF MAJOR DEPRESSIVE DISORDER WITHOUT PRIOR EPISODE: ICD-10-CM

## 2024-08-28 DIAGNOSIS — Z12.31 ENCOUNTER FOR SCREENING MAMMOGRAM FOR MALIGNANT NEOPLASM OF BREAST: ICD-10-CM

## 2024-08-28 DIAGNOSIS — G47.33 OSA ON CPAP: ICD-10-CM

## 2024-08-28 DIAGNOSIS — F41.8 SITUATIONAL ANXIETY: ICD-10-CM

## 2024-08-28 DIAGNOSIS — E78.49 OTHER HYPERLIPIDEMIA: ICD-10-CM

## 2024-08-28 DIAGNOSIS — Z78.0 ASYMPTOMATIC POSTMENOPAUSAL STATE: ICD-10-CM

## 2024-08-28 DIAGNOSIS — I10 ESSENTIAL HYPERTENSION: Primary | ICD-10-CM

## 2024-08-28 PROCEDURE — 3288F FALL RISK ASSESSMENT DOCD: CPT | Mod: CPTII,S$GLB,, | Performed by: INTERNAL MEDICINE

## 2024-08-28 PROCEDURE — 99214 OFFICE O/P EST MOD 30 MIN: CPT | Mod: S$GLB,,, | Performed by: INTERNAL MEDICINE

## 2024-08-28 PROCEDURE — 99999 PR PBB SHADOW E&M-EST. PATIENT-LVL IV: CPT | Mod: PBBFAC,,, | Performed by: INTERNAL MEDICINE

## 2024-08-28 PROCEDURE — 1126F AMNT PAIN NOTED NONE PRSNT: CPT | Mod: CPTII,S$GLB,, | Performed by: INTERNAL MEDICINE

## 2024-08-28 PROCEDURE — 1159F MED LIST DOCD IN RCRD: CPT | Mod: CPTII,S$GLB,, | Performed by: INTERNAL MEDICINE

## 2024-08-28 PROCEDURE — 3078F DIAST BP <80 MM HG: CPT | Mod: CPTII,S$GLB,, | Performed by: INTERNAL MEDICINE

## 2024-08-28 PROCEDURE — 3075F SYST BP GE 130 - 139MM HG: CPT | Mod: CPTII,S$GLB,, | Performed by: INTERNAL MEDICINE

## 2024-08-28 PROCEDURE — 1101F PT FALLS ASSESS-DOCD LE1/YR: CPT | Mod: CPTII,S$GLB,, | Performed by: INTERNAL MEDICINE

## 2024-08-28 RX ORDER — SERTRALINE HYDROCHLORIDE 50 MG/1
50 TABLET, FILM COATED ORAL DAILY
Qty: 30 TABLET | Refills: 11 | Status: SHIPPED | OUTPATIENT
Start: 2024-08-28 | End: 2025-08-28

## 2024-08-28 RX ORDER — TRAZODONE HYDROCHLORIDE 50 MG/1
50 TABLET ORAL NIGHTLY
Qty: 30 TABLET | Refills: 11 | Status: SHIPPED | OUTPATIENT
Start: 2024-08-28 | End: 2025-08-28

## 2024-09-18 NOTE — PROGRESS NOTES
Subjective:      Patient ID: Dalia Peña is a 79 y.o. female.    Chief Complaint: Follow-up (1 month f/u )      HPI  Here for follow up of medical problems.  Tolerating sertraline for last week.  Trazodone works pretty well for sleep.    On cipro for UTI.  Has had 3 this year.  Stools slow, uses colace prn and MgO prn.  Cares for  and grandson.        9/24 BMD:  FINDINGS:  The L1 to L4 vertebral bone mineral density is equal to 1.27 g/cm squared with a T score of 0.6.  There has been a 6.0% statistically significant change relative to the prior study.     The left femoral neck bone mineral density is equal to 0.83 g/cm squared with a T score of -1.5.  There has been  no significant change relative to the prior study.     There is a 13% risk of a major osteoporotic fracture and a 3% risk of hip fracture in the next 10 years (FRAX).     Impression:     Osteopenia      Advance Care Planning     Date: 09/26/2024    Power of   I initiated the process of voluntary advance care planning today and explained the importance of this process to the patient.  I introduced the concept of advance directives to the patient, as well. Then the patient received detailed information about the importance of designating a Health Care Power of  (HCPOA). She was also instructed to communicate with this person about their wishes for future healthcare, should she become sick and lose decision-making capacity. The patient has not previously appointed a HCPOA. After our discussion, the patient has decided to complete a HCPOA and has appointed her son, health care agent:  Rashaad Peña  & health care agent number:  [phone] . I encouraged her to communicate with this person about their wishes for future healthcare, should she become sick and lose decision-making capacity.      A total of 10 min was spent on advance care planning, goals of care discussion, emotional support, formulating and communicating  "prognosis and exploring burden/benefit of various approaches of treatment. This discussion occurred on a fully voluntary basis with the verbal consent of the patient and/or family.         Updated/ annual due 3/25:  HM:  11/22 fluvax, 5/21 covid vaccines, 8/24 HAV, 2/17 [id number], 11/18 [id number], 3/23 Tdap, 2022 Shingrix x2, 5/23 MMG, 9/24 BMD rep 2y, Cscope at 73yo rep 10y, 7/24 stress ECHO negative.      Review of Systems   Constitutional:  Negative for chills, diaphoresis and fever.   Respiratory:  Negative for cough and shortness of breath.    Cardiovascular:  Negative for chest pain, palpitations and leg swelling.   Gastrointestinal:  Negative for blood in stool, constipation, diarrhea, nausea and vomiting.   Genitourinary:  Negative for dysuria, frequency and hematuria.   Psychiatric/Behavioral:  The patient is not nervous/anxious.          Objective:   /60 (BP Location: Right arm, Patient Position: Sitting)   Pulse 65   Temp 97.3 °F (36.3 °C) (Skin)   Ht 5' 6" (1.676 m)   Wt 80.1 kg (176 lb 9.4 oz)   SpO2 96%   BMI 28.50 kg/m²     Physical Exam  Constitutional:       Appearance: She is well-developed.   Neck:      Thyroid: No thyroid mass.      Vascular: No carotid bruit.   Cardiovascular:      Rate and Rhythm: Normal rate and regular rhythm.      Heart sounds: No murmur heard.     No friction rub. No gallop.   Pulmonary:      Effort: Pulmonary effort is normal.      Breath sounds: Normal breath sounds. No wheezing or rales.   Abdominal:      General: Bowel sounds are normal.      Palpations: Abdomen is soft. There is no mass.      Tenderness: There is no abdominal tenderness.   Musculoskeletal:      Cervical back: Neck supple.   Lymphadenopathy:      Cervical: No cervical adenopathy.   Neurological:      Mental Status: She is alert and oriented to person, place, and time.           Assessment:       1. Essential hypertension    2. Current moderate episode of major depressive disorder without prior " episode    3. Primary insomnia    4. Cystitis without hematuria          Plan:     1. Essential hypertension- doing well, cont rx.  Overview:  Amlod 5mg,   metoprolol XL 25mg,   hctz 12.5mg daily.      2. Current moderate episode of major depressive disorder without prior episode- cont zoloft.  Send me message of status in 2 weeks, poss increase dose.    3. Primary insomnia- cont trazodone.    4. Cystitis without hematuria, #3 of the year- if any more, will refer to Urol.     RTC  3mo.  Fluvax and RSV at pharmacy.

## 2024-09-23 PROBLEM — Z00.00 ENCOUNTER FOR PREVENTIVE HEALTH EXAMINATION: Status: RESOLVED | Noted: 2024-06-21 | Resolved: 2024-09-23

## 2024-09-24 ENCOUNTER — PATIENT MESSAGE (OUTPATIENT)
Dept: PRIMARY CARE CLINIC | Facility: CLINIC | Age: 79
End: 2024-09-24
Payer: MEDICARE

## 2024-09-24 ENCOUNTER — TELEPHONE (OUTPATIENT)
Dept: PRIMARY CARE CLINIC | Facility: CLINIC | Age: 79
End: 2024-09-24
Payer: MEDICARE

## 2024-09-24 DIAGNOSIS — R30.0 DYSURIA: Primary | ICD-10-CM

## 2024-09-25 ENCOUNTER — HOSPITAL ENCOUNTER (OUTPATIENT)
Dept: RADIOLOGY | Facility: HOSPITAL | Age: 79
Discharge: HOME | End: 2024-09-25
Attending: INTERNAL MEDICINE
Payer: MEDICARE

## 2024-09-25 ENCOUNTER — PATIENT MESSAGE (OUTPATIENT)
Dept: PRIMARY CARE CLINIC | Facility: CLINIC | Age: 79
End: 2024-09-25
Payer: MEDICARE

## 2024-09-25 DIAGNOSIS — Z12.31 ENCOUNTER FOR SCREENING MAMMOGRAM FOR MALIGNANT NEOPLASM OF BREAST: ICD-10-CM

## 2024-09-25 PROCEDURE — 77067 SCR MAMMO BI INCL CAD: CPT | Mod: TC

## 2024-09-25 PROCEDURE — 77067 SCR MAMMO BI INCL CAD: CPT | Mod: 26,,, | Performed by: RADIOLOGY

## 2024-09-25 PROCEDURE — 77063 BREAST TOMOSYNTHESIS BI: CPT | Mod: 26,,, | Performed by: RADIOLOGY

## 2024-09-25 RX ORDER — CIPROFLOXACIN 250 MG/1
250 TABLET, FILM COATED ORAL 2 TIMES DAILY
Qty: 10 TABLET | Refills: 0 | Status: SHIPPED | OUTPATIENT
Start: 2024-09-25 | End: 2024-09-30

## 2024-09-26 ENCOUNTER — OFFICE VISIT (OUTPATIENT)
Dept: PRIMARY CARE CLINIC | Facility: CLINIC | Age: 79
End: 2024-09-26
Payer: MEDICARE

## 2024-09-26 VITALS
HEART RATE: 65 BPM | OXYGEN SATURATION: 96 % | TEMPERATURE: 97 F | WEIGHT: 176.56 LBS | HEIGHT: 66 IN | DIASTOLIC BLOOD PRESSURE: 60 MMHG | BODY MASS INDEX: 28.38 KG/M2 | SYSTOLIC BLOOD PRESSURE: 124 MMHG

## 2024-09-26 DIAGNOSIS — F32.1 CURRENT MODERATE EPISODE OF MAJOR DEPRESSIVE DISORDER WITHOUT PRIOR EPISODE: ICD-10-CM

## 2024-09-26 DIAGNOSIS — F51.01 PRIMARY INSOMNIA: ICD-10-CM

## 2024-09-26 DIAGNOSIS — N30.90 CYSTITIS WITHOUT HEMATURIA: ICD-10-CM

## 2024-09-26 DIAGNOSIS — I10 ESSENTIAL HYPERTENSION: Primary | ICD-10-CM

## 2024-09-26 PROCEDURE — 1126F AMNT PAIN NOTED NONE PRSNT: CPT | Mod: CPTII,S$GLB,, | Performed by: INTERNAL MEDICINE

## 2024-09-26 PROCEDURE — 1159F MED LIST DOCD IN RCRD: CPT | Mod: CPTII,S$GLB,, | Performed by: INTERNAL MEDICINE

## 2024-09-26 PROCEDURE — 1158F ADVNC CARE PLAN TLK DOCD: CPT | Mod: CPTII,S$GLB,, | Performed by: INTERNAL MEDICINE

## 2024-09-26 PROCEDURE — 99999 PR PBB SHADOW E&M-EST. PATIENT-LVL III: CPT | Mod: PBBFAC,,, | Performed by: INTERNAL MEDICINE

## 2024-09-26 PROCEDURE — 3288F FALL RISK ASSESSMENT DOCD: CPT | Mod: CPTII,S$GLB,, | Performed by: INTERNAL MEDICINE

## 2024-09-26 PROCEDURE — 99214 OFFICE O/P EST MOD 30 MIN: CPT | Mod: S$GLB,,, | Performed by: INTERNAL MEDICINE

## 2024-09-26 PROCEDURE — 3078F DIAST BP <80 MM HG: CPT | Mod: CPTII,S$GLB,, | Performed by: INTERNAL MEDICINE

## 2024-09-26 PROCEDURE — 1101F PT FALLS ASSESS-DOCD LE1/YR: CPT | Mod: CPTII,S$GLB,, | Performed by: INTERNAL MEDICINE

## 2024-09-26 PROCEDURE — 3074F SYST BP LT 130 MM HG: CPT | Mod: CPTII,S$GLB,, | Performed by: INTERNAL MEDICINE

## 2024-10-15 ENCOUNTER — PATIENT MESSAGE (OUTPATIENT)
Dept: PRIMARY CARE CLINIC | Facility: CLINIC | Age: 79
End: 2024-10-15
Payer: MEDICARE

## 2024-11-22 ENCOUNTER — TELEPHONE (OUTPATIENT)
Dept: PRIMARY CARE CLINIC | Facility: CLINIC | Age: 79
End: 2024-11-22
Payer: MEDICARE

## 2024-11-27 RX ORDER — BUSPIRONE HYDROCHLORIDE 5 MG/1
5 TABLET ORAL 2 TIMES DAILY
Qty: 60 TABLET | Refills: 3 | Status: SHIPPED | OUTPATIENT
Start: 2024-11-27

## 2024-12-02 ENCOUNTER — CLINICAL SUPPORT (OUTPATIENT)
Dept: PRIMARY CARE CLINIC | Facility: CLINIC | Age: 79
End: 2024-12-02
Payer: MEDICARE

## 2024-12-02 DIAGNOSIS — F43.21 GRIEF: ICD-10-CM

## 2024-12-02 DIAGNOSIS — F41.8 SITUATIONAL ANXIETY: Primary | ICD-10-CM

## 2024-12-02 DIAGNOSIS — Z63.6 CAREGIVER STRESS: ICD-10-CM

## 2024-12-02 PROCEDURE — 99499 UNLISTED E&M SERVICE: CPT | Mod: S$GLB,,,

## 2024-12-02 SDOH — SOCIAL DETERMINANTS OF HEALTH (SDOH): DEPENDENT RELATIVE NEEDING CARE AT HOME: Z63.6

## 2024-12-02 NOTE — PROGRESS NOTES
Individual Psychotherapy (LCSW)  Dalia Peña,  2024  DATE:  2024  TYPE OF VISIT:  In person  LENGTH OF SESSION: 45    Therapeutic Intervention: Met with patient for individual psychotherapy.    Chief complaint/reason for encounter: anxiety and interpersonal     Session Content/Presenting Problem:  2023 PHQ 2; ROSY 5  2024 PHQ   ; ROSY     Met with patient for first session since last July.  Patient reports that her ,Satnam  about 6 weeks ago.  Says the last month of his life was very difficult.  He became short of breath and he was admitted to the hospital.  He was discharged home; had gotten a lift chair and a hospital bed, home oxygen. Had a neighbor help him into the house. Used his walker to get up and he fell so had to call 911 again. Sent him to the trauma center at [facility]; he  2 days later. He suffered so much in the end; it was not a good experience.  Satnam had asked a  friend to come see him a few weeks before he ; said confession and got last rites.  Still feels unreal; Neal thinks he hears him sometimes. Patient believes she had not been aware of Satnam's decline; she had not expected him to die. She reports feeling surprised when the ICU doctor told her that Satnam that was dying.   Felt extremely fatigued; dreaded getting through the ; her son did make it back for the .  Jorge had also come home a few weeks prior to help her with Neal. He stayed for a few weeks after the  to help her out.  Patient says she is starting to feel better; she was able to get her bedroom back in working order after they got all of the DME moved out. She describes having moments of fear where she doubts her ability to manage without Satnam.  Supported patient in her feelings of grief and fear; patient was  to Satnam for the majority of her life. Urged her to be easy on herself; normalized her feelings of fatigue as a sign of grieving.   Discussed the possibility of her using resources such as [facility] and grief support groups in the area for support for her and for Neal.  Patient reports increasing difficulty in managing Neal. He is 13 and has told her he no longer has to do what she says.  Finds herself often negotiating with Neal to get him to go to bed or do things around the house. She did tell him to get himself up and dressed and to the bus one morning.  He was mostly able to do this with some direction from her.  Discussed her desire to allow Neal to be more independent.  Neal is currently falling behind in his classes; he had previously been held back and may need to be held back again.  Discussed the plan for Neal to see a new psychiatrist; encouraged patient to have eNal tested for learning disabilities, ADHD, etcetera and requesting services based on his diagnoses.  She will discuss with the new doctor. Neal is now seeing a male counselor which seems to be good for him.   Patient expresses her worry that she will not have the energy to continue to care for Neal as he gets older.  Already is unwilling to take him anywhere at night.  Neither of them feel safe going out at night. Satnam had been able to manage Neal and take him to boy scouts and other activities before his illness.  She is unhappy that the school has not been more aware of Neal's problems; urged her to schedule a meeting with the counselor, teachers, etc to discuss Neal.  Neal has had health issues recently that have caused him to fall further behind.  Some concern that he may not be allowed to continue at [employer].  Patient is hopeful that she will start to reconnect with [employer] friends. She has been helped by some good neighbors.  Satnam's older brother calls to check on her; he is 87 and limited in what he can do for her.  Patient has pulled away from managing her sister's issues.  Is letting her niece care for her now.  Her niece stops in to see  "her now and then also.   Looking forward to going to [place] to see Jorge at [facility].  Hopeful that they will have a good time with Jorge. Sad that he is selling the historic house he has lived in with his wife as they prepare to divorce.  She describes Jorge as also emotionally and physically drained with all he has been going through.   Support and empathy provided to patient. Normalized her feelings of grief and fatigue;encouraged patient to continue to reach out to her good friends for support.    Will return in one month for follow up.       Past Sessions:   Patient discusses a number of stressful situations she is currently dealing with. Her  aStnam has been receiving cancer treatment for a number of months. He had new scans which showed no improvement in his cancer. Requested a second opinion from his oncologist. This has not happened yet. Some frustration with [facility]; they just ended their relationship with the [facility]; they no longer offer a patient navigator to assist with treatment planning. Satnam has been weak and sick, sleeping often. He is hopeful that patient will get a break from him; he urged her to take Neal to [place]. Patient expresses reluctance to be gone longer than a day at this time. Discussed possible options she may take for a quick rest and relaxation. Has made plans with her son Jorge for him to have Neal with him in [place] for about 3 weeks.  She will try to schedule some things while Neal is out of town.   Patient is also helping Jorge to buy a new house; his relationship with his wife appears to be non salvageable.  Patient feels "sad" that she has not planned a family trip for Neal this summer. He is able to go to the [employer] pool.   Patient is also trying to deal with her sister Marina's situation. Has looked at some facilities for possible long term placement.  Some issues dealing with Marina's s.o.  May need to talk with his family, and an , in " trying to figure out the financial situation for each of them. Patient is in touch with Marina's son and recently helped him after a surgery.    Patient is the person everyone in the family looks to for assistance; she helps with taxes, paperwork, etc. Patient recognizes this; she tries to be mindful of needing time to focus on herself and to avoid feeling overwhelmed.  Encouraged patient to find time for herself when possible. Patient says she is always able to find sol in the day no matter what she is experiencing.       Current symptoms:  Depression: hopelessness and fatigue.  Anxiety: excessive worrying.  Insomnia: non-restful sleep.  Deneen:  denies.  Psychosis: denies .      Risk parameters:  Patient reports no suicidal ideation  Patient reports no homicidal ideation  Patient reports no self-injurious behavior  Patient reports no violent behavior    MENTAL HEALTH STATUS EXAM  General Appearance:  unremarkable, age appropriate   Speech: normal tone, normal rate, normal pitch, normal volume      Level of Cooperation: cooperative      Thought Processes: normal and logical   Mood: steady      Thought Content: normal, no suicidality, no homicidality, delusions, or paranoia   Affect: congruent and appropriate   Orientation: Oriented x3   Attention Span & Concentration: intact   Fund of General Knowledge: intact and appropriate to age and level of education   Judgment & Insight: good     Language  intact       STRENGTHS AND LIABILITIES: Strength: Patient accepts guidance/feedback, Strength: Patient is expressive/articulate., Strength: Patient is intelligent., Strength: Patient is stable.    IMPRESSION:   My diagnostic impression is  situational anxiety and caregiver stress , as evidenced by patient report of appropriately feeling stress due to caring for her  with cancer; her 11 yo grandson with special needs and her sister with dementia.     TREATMENT GOALS (per patient):    To manage anxiety related to  's cancer diagnosis; to take care of herself. To manage worry related to raising their 12 year old grandson, Neal.       Treatment plan:  Target symptoms: anxiety , adjustment  Why chosen therapy is appropriate versus another modality: relevant to diagnosis, patient responds to this modality, evidence based practice  Outcome monitoring methods: self-report, observation, checklist/rating scale  Therapeutic intervention type: insight oriented psychotherapy    Patient's response to intervention:  The patient's response to intervention is accepting.    Progress toward goals and other mental status changes:  The patient's progress toward goals is good.    Plan: Pt plans to continue individual psychotherapy CBT will be utilized in future individual therapy sessions to increase interaction, insight, support, and parent/behavior management.     Return to clinic: 1 month  Jan 13th

## 2024-12-07 DIAGNOSIS — I10 ESSENTIAL HYPERTENSION: ICD-10-CM

## 2024-12-09 ENCOUNTER — OFFICE VISIT (OUTPATIENT)
Dept: PRIMARY CARE CLINIC | Facility: CLINIC | Age: 79
End: 2024-12-09
Payer: MEDICARE

## 2024-12-09 VITALS
HEIGHT: 66 IN | OXYGEN SATURATION: 96 % | WEIGHT: 172.5 LBS | HEART RATE: 70 BPM | BODY MASS INDEX: 27.72 KG/M2 | SYSTOLIC BLOOD PRESSURE: 102 MMHG | TEMPERATURE: 97 F | DIASTOLIC BLOOD PRESSURE: 62 MMHG

## 2024-12-09 DIAGNOSIS — F32.1 CURRENT MODERATE EPISODE OF MAJOR DEPRESSIVE DISORDER WITHOUT PRIOR EPISODE: ICD-10-CM

## 2024-12-09 DIAGNOSIS — I10 ESSENTIAL HYPERTENSION: ICD-10-CM

## 2024-12-09 DIAGNOSIS — F51.01 PRIMARY INSOMNIA: ICD-10-CM

## 2024-12-09 DIAGNOSIS — N30.00 ACUTE CYSTITIS WITHOUT HEMATURIA: Primary | ICD-10-CM

## 2024-12-09 PROBLEM — H61.21 IMPACTED CERUMEN OF RIGHT EAR: Status: RESOLVED | Noted: 2023-06-05 | Resolved: 2024-12-09

## 2024-12-09 PROBLEM — N39.0 UTI (URINARY TRACT INFECTION): Status: ACTIVE | Noted: 2024-12-09

## 2024-12-09 PROCEDURE — 1101F PT FALLS ASSESS-DOCD LE1/YR: CPT | Mod: CPTII,S$GLB,, | Performed by: NURSE PRACTITIONER

## 2024-12-09 PROCEDURE — 99999 PR PBB SHADOW E&M-EST. PATIENT-LVL III: CPT | Mod: PBBFAC,,, | Performed by: NURSE PRACTITIONER

## 2024-12-09 PROCEDURE — 1126F AMNT PAIN NOTED NONE PRSNT: CPT | Mod: CPTII,S$GLB,, | Performed by: NURSE PRACTITIONER

## 2024-12-09 PROCEDURE — 1159F MED LIST DOCD IN RCRD: CPT | Mod: CPTII,S$GLB,, | Performed by: NURSE PRACTITIONER

## 2024-12-09 PROCEDURE — 3074F SYST BP LT 130 MM HG: CPT | Mod: CPTII,S$GLB,, | Performed by: NURSE PRACTITIONER

## 2024-12-09 PROCEDURE — 99215 OFFICE O/P EST HI 40 MIN: CPT | Mod: S$GLB,,, | Performed by: NURSE PRACTITIONER

## 2024-12-09 PROCEDURE — 3288F FALL RISK ASSESSMENT DOCD: CPT | Mod: CPTII,S$GLB,, | Performed by: NURSE PRACTITIONER

## 2024-12-09 PROCEDURE — 3078F DIAST BP <80 MM HG: CPT | Mod: CPTII,S$GLB,, | Performed by: NURSE PRACTITIONER

## 2024-12-09 PROCEDURE — 1157F ADVNC CARE PLAN IN RCRD: CPT | Mod: CPTII,S$GLB,, | Performed by: NURSE PRACTITIONER

## 2024-12-09 RX ORDER — HYDROCHLOROTHIAZIDE 12.5 MG/1
12.5 TABLET ORAL DAILY
Qty: 90 TABLET | Refills: 3 | Status: SHIPPED | OUTPATIENT
Start: 2024-12-09 | End: 2025-12-09

## 2024-12-09 RX ORDER — CIPROFLOXACIN 250 MG/1
250 TABLET, FILM COATED ORAL 2 TIMES DAILY
Qty: 14 TABLET | Refills: 0 | Status: SHIPPED | OUTPATIENT
Start: 2024-12-09 | End: 2024-12-16

## 2024-12-09 RX ORDER — HYDROCHLOROTHIAZIDE 12.5 MG/1
12.5 TABLET ORAL
Qty: 90 TABLET | Refills: 0 | OUTPATIENT
Start: 2024-12-09

## 2024-12-09 NOTE — PROGRESS NOTES
Dalia Peña  12/09/2024  [id number]    Giselle Murguia MD  Patient Care Team:  Giselle Murguia MD as PCP - General (Internal Medicine)        [facility] Primary Care Note      Chief Complaint:  Chief Complaint   Patient presents with    Follow-up     3 month f/u     Medication Problem     Pt has concerns with medications         Assessment/Plan:  1. Acute cystitis without hematuria  Assessment & Plan:  Denies acute symptoms at this time  Leaving for [place] in a few days  Empiric prescription provided in case symptoms develop  With 3rd UTI - will need Urology referral    Orders:  -     ciprofloxacin HCl (CIPRO) 250 MG tablet; Take 1 tablet (250 mg total) by mouth 2 (two) times daily. for 7 days  Dispense: 14 tablet; Refill: 0    2. Current moderate episode of major depressive disorder without prior episode  Assessment & Plan:  Stable, continue Zoloft 50 mg      3. Essential hypertension  Overview:  Amlod 5mg,   metoprolol XL 25mg,   hctz 12.5mg daily.    Assessment & Plan:  Controlled on current regimen  Continue to monitor and record      4. Primary insomnia  Assessment & Plan:  Sleeping better with trazodone 50 mg nightly            Worry Score: 2  History of Present Illness:    Last visit with Dr. Murguia 9/26/2024   BP doing well on current Tx, zoloft for depression, will see Urology for next UTI (3rd of year). Caring for  and grandson.  At last visit - trazodone and Zoloft prescribed    Situation worsened after last visit - ultimately  passed away in October.   Mentally feeling some better at this point. Still has challenges with GS  Trazodone working for sleep -  Feels a little off when she awakens in the AM.  Cannot sleep with Trazodone 25 mg  B/P readings from home 120/70, 120/73, 133/76 - have been normal  Onset of feet burning when  was in hospital and it would progressively worsen during day - started in October  Burning relieved by Tylenol in AM and Aleve in PM  - now  less intense  She is mindful of what she is eating. Few pounds down      Recent Fall:  []Yes  [x]No  Activity:  []Vigorous [x]Moderate []Sedentary  Extra walking  Appetite:  [x]Good  []Fair  []Poor  Mood: [x]Stable []Anxious []Depressed   She feels stable  Insomnia: []Yes  [x]No   Trazodone working    Started with nightly CPAP - thinks it is helping - feels more rested  BMs with Magnesium - mild rectal pain, scant bleeding with wiping    Denies fever, chills, URI symptoms, chest pain, SOB, palpitations, vomiting, diarrhea, no gross neuro deficits, urinary symptoms       The following were reviewed: Active problem list, medication list, allergies, family history, social history, and Health Maintenance.     History:  Past Medical History:   Diagnosis Date    Essential (primary) hypertension     Hyperlipemia      Past Surgical History:   Procedure Laterality Date    CATARACT EXTRACTION Right     none       No family history on file.  Patient Active Problem List   Diagnosis    Essential hypertension    Other hyperlipidemia    Situational anxiety    Osteopenia with high risk of fracture    Abnormality of gait and mobility    Caregiver stress    History of nonmelanoma skin cancer    History of dysplastic nevus    Family history of coronary artery disease    Symptomatic PVCs    Primary insomnia    Statin myopathy    JESUS on CPAP    Encounter for screening mammogram for malignant neoplasm of breast    History of falling    Statin intolerance    Current moderate episode of major depressive disorder without prior episode    UTI (urinary tract infection)     Review of patient's allergies indicates:   Allergen Reactions    Statins-hmg-coa reductase inhibitors      Muscle aches and fatigue    Sulfa (sulfonamide antibiotics) Diarrhea     Other reaction(s): GI Intolerance         Medications:  Current Outpatient Medications on File Prior to Visit   Medication Sig Dispense Refill    amLODIPine (NORVASC) 5 MG tablet       busPIRone  (BUSPAR) 5 MG Tab Take 1 tablet (5 mg total) by mouth 2 (two) times daily. 60 tablet 3    magnesium oxide (MAG-OX) 400 mg (241.3 mg magnesium) tablet Take 400 mg by mouth once daily.      omeprazole (PRILOSEC) 20 MG capsule Take 20 mg by mouth once daily.      sertraline (ZOLOFT) 50 MG tablet Take 1 tablet (50 mg total) by mouth once daily. 30 tablet 11    traZODone (DESYREL) 50 MG tablet Take 1 tablet (50 mg total) by mouth every evening. 30 tablet 11    vitamin D (VITAMIN D3) 1000 units Tab Take 1,000 Units by mouth once daily.      metoprolol succinate (TOPROL-XL) 25 MG 24 hr tablet Take 1 tablet (25 mg total) by mouth once daily. 30 tablet 11    [DISCONTINUED] hydroCHLOROthiazide (HYDRODIURIL) 12.5 MG Tab Take 1 tablet (12.5 mg total) by mouth once daily. 90 tablet 3     No current facility-administered medications on file prior to visit.       Medications have been reviewed and reconciled with patient at visit today.      Exam:  Vitals:    12/09/24 0855   BP: 102/62   Pulse: 70   Temp: 97.1 °F (36.2 °C)     Weight: 78.3 kg (172 lb 8.2 oz)   Body mass index is 27.84 kg/m².      BP Readings from Last 3 Encounters:   12/09/24 102/62   09/26/24 124/60   08/28/24 132/60     Wt Readings from Last 3 Encounters:   12/09/24 78.3 kg (172 lb 8.2 oz)   09/26/24 80.1 kg (176 lb 9.4 oz)   08/28/24 80.7 kg (177 lb 14.6 oz)         Physical Exam  Vitals reviewed.   Constitutional:       General: She is not in acute distress.     Appearance: Normal appearance.   HENT:      Head: Normocephalic and atraumatic.      Right Ear: Tympanic membrane normal.      Left Ear: Tympanic membrane normal.      Nose: Nose normal.      Mouth/Throat:      Mouth: Mucous membranes are moist.   Eyes:      General: No scleral icterus.     Conjunctiva/sclera: Conjunctivae normal.   Cardiovascular:      Rate and Rhythm: Normal rate and regular rhythm.      Heart sounds: No murmur heard.  Pulmonary:      Effort: Pulmonary effort is normal. No  "respiratory distress.      Breath sounds: Normal breath sounds.   Abdominal:      Palpations: Abdomen is soft.      Tenderness: There is no abdominal tenderness.   Musculoskeletal:      Cervical back: Normal range of motion and neck supple.      Right lower leg: Edema present.      Left lower leg: Edema present.   Skin:     General: Skin is warm and dry.   Neurological:      Mental Status: She is alert and oriented to person, place, and time. Mental status is at baseline.   Psychiatric:         Mood and Affect: Mood normal.         Thought Content: Thought content normal.              Laboratory Reviewed:     Lab Results   Component Value Date    WBC 5.96 03/18/2024    HGB 13.8 03/18/2024    HCT 42.8 03/18/2024     03/18/2024    CHOL 210 (H) 03/26/2024    TRIG 251 (H) 03/26/2024    HDL 40 03/26/2024    ALT 14 08/04/2023    AST 19 03/09/2023     03/18/2024    K 4.5 03/18/2024     03/18/2024    CREATININE 0.8 03/18/2024    BUN 19 03/18/2024    CO2 27 03/18/2024    TSH 0.883 03/09/2023       Screening or Prevention Patient's value Goal Complete/Controlled?   HgA1C Testing and Control   No results found for: "HGBA1C"   Annually/Less than 8% No   Lipid profile : 03/26/2024 Annually Yes   LDL control Lab Results   Component Value Date    LDLCALC 119.8 03/26/2024    Annually/Less than 100 mg/dl  No   Nephropathy screening No results found for: "LABMICR"  Lab Results   Component Value Date    PROTEINUA Negative 09/25/2024    Annually Yes   Blood pressure BP Readings from Last 1 Encounters:   12/09/24 102/62    Less than 140/90 Yes   Dilated retinal exam Most Recent Eye Exam Date: Not Found Annually Yes   Foot exam   Most Recent Foot Exam Date: Not Found Annually Yes       Health Maintenance  Health Maintenance Topics with due status: Not Due       Topic Last Completion Date    TETANUS VACCINE 03/10/2023    Lipid Panel 03/26/2024    DEXA Scan 09/25/2024     Health Maintenance Due   Topic Date Due    " COVID-19 Vaccine (3 - 2024-25 season) 09/01/2024               -Patient's lab results were reviewed and discussed with patient  -Treatment options and alternatives were discussed with the patient. Patient expressed understanding. Patient was given the opportunity to ask questions and be an active participant in their medical care. Patient had no further questions or concerns at this time.         Future Appointments   Date Time Provider Department Center   1/9/2025  9:40 AM Vianney Bui MD [facility] CARDIO [facility]   1/13/2025  2:00 PM [address]y [address] LCSW [facility]   1/30/2025  9:30 AM Radha Leavitt PA-C [facility] PULMSVC [facility]   3/6/2025  8:20 AM Giselle Murguia MD [facility]          After visit summary printed and given to patient upon discharge.  Patient goals and care plan are included in After visit summary.      The following issues were discussed: The primary encounter diagnosis was Acute cystitis without hematuria. Diagnoses of Current moderate episode of major depressive disorder without prior episode, Essential hypertension, and Primary insomnia were also pertinent to this visit.    Health maintenance needs, recent test results and goals of care discussed with pt and questions answered.           JOANIE Galarza, NP-C  [facility]  [address] [place] [address]

## 2024-12-09 NOTE — ASSESSMENT & PLAN NOTE
Denies acute symptoms at this time  Leaving for [place] in a few days  Empiric prescription provided in case symptoms develop  With 3rd UTI - will need Urology referral

## 2024-12-31 DIAGNOSIS — I10 HYPERTENSION, UNSPECIFIED TYPE: ICD-10-CM

## 2024-12-31 DIAGNOSIS — I49.3 PVC (PREMATURE VENTRICULAR CONTRACTION): ICD-10-CM

## 2025-01-02 RX ORDER — METOPROLOL SUCCINATE 25 MG/1
25 TABLET, EXTENDED RELEASE ORAL
Qty: 90 TABLET | Refills: 3 | Status: SHIPPED | OUTPATIENT
Start: 2025-01-02

## 2025-01-09 ENCOUNTER — OFFICE VISIT (OUTPATIENT)
Dept: CARDIOLOGY | Facility: CLINIC | Age: 80
End: 2025-01-09
Payer: MEDICARE

## 2025-01-09 VITALS
HEART RATE: 53 BPM | BODY MASS INDEX: 28.22 KG/M2 | SYSTOLIC BLOOD PRESSURE: 130 MMHG | DIASTOLIC BLOOD PRESSURE: 69 MMHG | WEIGHT: 174.81 LBS | OXYGEN SATURATION: 97 %

## 2025-01-09 DIAGNOSIS — I49.3 PVC (PREMATURE VENTRICULAR CONTRACTION): Primary | ICD-10-CM

## 2025-01-09 DIAGNOSIS — R00.1 BRADYCARDIA: ICD-10-CM

## 2025-01-09 DIAGNOSIS — Z78.9 STATIN INTOLERANCE: ICD-10-CM

## 2025-01-09 DIAGNOSIS — E78.5 HYPERLIPIDEMIA, UNSPECIFIED HYPERLIPIDEMIA TYPE: ICD-10-CM

## 2025-01-09 DIAGNOSIS — R06.09 DOE (DYSPNEA ON EXERTION): ICD-10-CM

## 2025-01-09 DIAGNOSIS — F41.8 SITUATIONAL ANXIETY: ICD-10-CM

## 2025-01-09 DIAGNOSIS — I10 ESSENTIAL HYPERTENSION: ICD-10-CM

## 2025-01-09 PROCEDURE — 1159F MED LIST DOCD IN RCRD: CPT | Mod: CPTII,S$GLB,, | Performed by: INTERNAL MEDICINE

## 2025-01-09 PROCEDURE — 3075F SYST BP GE 130 - 139MM HG: CPT | Mod: CPTII,S$GLB,, | Performed by: INTERNAL MEDICINE

## 2025-01-09 PROCEDURE — 3288F FALL RISK ASSESSMENT DOCD: CPT | Mod: CPTII,S$GLB,, | Performed by: INTERNAL MEDICINE

## 2025-01-09 PROCEDURE — 99214 OFFICE O/P EST MOD 30 MIN: CPT | Mod: S$GLB,,, | Performed by: INTERNAL MEDICINE

## 2025-01-09 PROCEDURE — 1126F AMNT PAIN NOTED NONE PRSNT: CPT | Mod: CPTII,S$GLB,, | Performed by: INTERNAL MEDICINE

## 2025-01-09 PROCEDURE — 99999 PR PBB SHADOW E&M-EST. PATIENT-LVL III: CPT | Mod: PBBFAC,,, | Performed by: INTERNAL MEDICINE

## 2025-01-09 PROCEDURE — 1101F PT FALLS ASSESS-DOCD LE1/YR: CPT | Mod: CPTII,S$GLB,, | Performed by: INTERNAL MEDICINE

## 2025-01-09 PROCEDURE — 3078F DIAST BP <80 MM HG: CPT | Mod: CPTII,S$GLB,, | Performed by: INTERNAL MEDICINE

## 2025-01-09 PROCEDURE — 1157F ADVNC CARE PLAN IN RCRD: CPT | Mod: CPTII,S$GLB,, | Performed by: INTERNAL MEDICINE

## 2025-01-09 NOTE — PROGRESS NOTES
Subjective:   Patient ID:  Dalia Peña is a 79 y.o. female who presents for evaluation of Follow-up      Follow-up  Pertinent negatives include no chest pain.   1.9.2025  Comes in for six-month visit.    Denies any significant FORREST, angina, very rare palpitations, minimal ankle lower extremity swelling , no syncope or presyncope   Compliant with medications.    Normal stress echo after last visit   Started on zoloft by PCP , grieving her  passed away in October, raising her grandson     6.17.2024  Comes in for six-month follow-up.    Her blood pressure is improved.  She was recently added on HCTZ by PCP in January.    Her readings at home are somewhat higher than our reading today.  But still within acceptable range.    Denies chest pain or heaviness however she reports new dyspnea on exertion NYHA level 1-2.  No lower extremity swelling.          12.7.2023  Comes in for a six-month follow-up.    She had a event monitor after last visit showed 20% PVC.    She states that she was feeling them at that time however she states that this was due to the atorvastatin as she thinks that once she got off the medication she was feeling better.    She denies any palpitations syncope or presyncope   I felt her pulse for more than 2 minutes today without any syncope PVC.  She states that she has been feeling well lately.    Her blood pressure is slightly elevated and she states that this is same as her blood pressure at home.  She is also little anxious lately about her  getting worked up for possible esophageal tumor.    Reports that she was intolerant to the pravastatin so she stopped it.    8.2023  78-year-old female, with past medical history listed below.    Recently started on atorvastatin.  States that since then she is been feeling wobbly sometimes all of the sudden.    She was also noted to have bradycardia on her doctor's visit with a heart rate in the 40s however her EKG showed normal sinus  rhythm in the 60s.    On exam she does have some PACs.    She denies any syncope or presyncope.    She states that she will follow with Dr. Murguia about her neuro complaints.    She wants to come off the atorvastatin as she claims to be the cause of her symptoms.  Denies any chest pain or dyspnea on exertion.    I reviewed her heart rate to her I watch recording which ranged between 40 and 110.  Some of them in the morning between 8 and 9:00 a.m..    Past Medical History:   Diagnosis Date    Essential (primary) hypertension     Hyperlipemia        Past Surgical History:   Procedure Laterality Date    CATARACT EXTRACTION Right     none         Social History     Tobacco Use    Smoking status: Never    Smokeless tobacco: Never       No family history on file.    Review of Systems   Cardiovascular:  Negative for chest pain, dyspnea on exertion, palpitations and syncope.   Genitourinary: Negative.    Neurological: Negative.        Current Outpatient Medications on File Prior to Visit   Medication Sig    amLODIPine (NORVASC) 5 MG tablet     busPIRone (BUSPAR) 5 MG Tab Take 1 tablet (5 mg total) by mouth 2 (two) times daily.    hydroCHLOROthiazide (HYDRODIURIL) 12.5 MG Tab Take 1 tablet (12.5 mg total) by mouth once daily.    magnesium oxide (MAG-OX) 400 mg (241.3 mg magnesium) tablet Take 400 mg by mouth once daily.    metoprolol succinate (TOPROL-XL) 25 MG 24 hr tablet TAKE ONE TABLET BY MOUTH EVERY DAY    omeprazole (PRILOSEC) 20 MG capsule Take 20 mg by mouth once daily.    sertraline (ZOLOFT) 50 MG tablet Take 1 tablet (50 mg total) by mouth once daily.    traZODone (DESYREL) 50 MG tablet Take 1 tablet (50 mg total) by mouth every evening.    vitamin D (VITAMIN D3) 1000 units Tab Take 1,000 Units by mouth once daily.     No current facility-administered medications on file prior to visit.       Objective:   Objective:  Wt Readings from Last 3 Encounters:   01/09/25 79.3 kg (174 lb 13.2 oz)   12/09/24 78.3 kg (172  lb 8.2 oz)   09/26/24 80.1 kg (176 lb 9.4 oz)     Temp Readings from Last 3 Encounters:   12/09/24 97.1 °F (36.2 °C) (Skin)   09/26/24 97.3 °F (36.3 °C) (Skin)   08/28/24 97.4 °F (36.3 °C) (Skin)     BP Readings from Last 3 Encounters:   01/09/25 130/69   12/09/24 102/62   09/26/24 124/60     Pulse Readings from Last 3 Encounters:   01/09/25 (!) 53   12/09/24 70   09/26/24 65       Physical Exam  Vitals reviewed.   Constitutional:       Appearance: She is well-developed.   Neck:      Vascular: No carotid bruit.   Cardiovascular:      Rate and Rhythm: Normal rate and regular rhythm.      Pulses: Intact distal pulses.      Heart sounds: Normal heart sounds. No murmur heard.  Pulmonary:      Breath sounds: Normal breath sounds.   Neurological:      Mental Status: She is oriented to person, place, and time.       Lab Results   Component Value Date    CHOL 210 (H) 03/26/2024    CHOL 144 08/04/2023    CHOL 207 (H) 03/09/2023     Lab Results   Component Value Date    HDL 40 03/26/2024    HDL 48 08/04/2023    HDL 47 03/09/2023     Lab Results   Component Value Date    LDLCALC 119.8 03/26/2024    LDLCALC 73.2 08/04/2023    LDLCALC 123.4 03/09/2023     Lab Results   Component Value Date    TRIG 251 (H) 03/26/2024    TRIG 114 08/04/2023    TRIG 183 (H) 03/09/2023     Lab Results   Component Value Date    CHOLHDL 19.0 (L) 03/26/2024    CHOLHDL 33.3 08/04/2023    CHOLHDL 22.7 03/09/2023       Chemistry        Component Value Date/Time     03/18/2024 0958    K 4.5 03/18/2024 0958     03/18/2024 0958    CO2 27 03/18/2024 0958    BUN 19 03/18/2024 0958    CREATININE 0.8 03/18/2024 0958    GLU 96 03/18/2024 0958        Component Value Date/Time    CALCIUM 9.8 03/18/2024 0958    ALKPHOS 86 03/09/2023 1616    AST 19 03/09/2023 1616    ALT 14 08/04/2023 1007    BILITOT 0.3 03/09/2023 1616    ESTGFRAFRICA >60.0 02/24/2022 0900    EGFRNONAA >60.0 02/24/2022 0900          Lab Results   Component Value Date    TSH 0.883  "03/09/2023     No results found for: "INR", "PROTIME"  Lab Results   Component Value Date    WBC 5.96 03/18/2024    HGB 13.8 03/18/2024    HCT 42.8 03/18/2024    MCV 96 03/18/2024     03/18/2024     BNP  @LABRCNTIP(BNP,BNPTRIAGEBLO)@  CrCl cannot be calculated (Patient's most recent lab result is older than the maximum 7 days allowed.).     Imaging:  ======    No results found for this or any previous visit.    No results found for this or any previous visit.    Results for orders placed during the hospital encounter of 02/24/22    X-Ray Chest PA And Lateral    Narrative  EXAMINATION:  XR CHEST PA AND LATERAL    CLINICAL HISTORY:  Essential (primary) hypertension    TECHNIQUE:  PA and lateral views of the chest were performed.    COMPARISON:  None    FINDINGS:  Clear lungs.  No effusion.  Descending thoracic aorta is slightly tortuous.  Cardiomediastinal silhouette is not enlarged.  Degenerative changes of the spine are noted    Impression  No acute findings.      Electronically signed by: Primo Hernandez MD  Date:    02/24/2022  Time:    09:54    No results found for this or any previous visit.    No valid procedures specified.    No results found for this or any previous visit.      No results found for this or any previous visit.      No results found for this or any previous visit.      Diagnostic Results:  ECG: Reviewed  Conclusion         The predominant rhythm is sinus.    Patient Reported Event #1 Patient activated. The patient complained of 2 episodes. The symptom(s) included dizziness. The corresponding rhythm to the patient reported event was sinus tachycardia. These symptoms were associated with PVCs    Patient Reported Event #2 Patient activated. The patient complained of 1 episodes. The symptom(s) included shortness of breath. The corresponding rhythm to the patient reported event was sinus tachycardia. These symptoms were associated with PVCs.    Patient Reported Event #3 Patient activated. The " patient complained of 1 episodes. The corresponding rhythm to the patient reported event was sinus tachycardia.    There were 70 runs and lasting to 7 beats. The rate varied between and 172 bpm    The patient presented symptoms corresponding with sinus tachycardia     The patient was monitored for a total of 14d 2h, underlying rhythm is Sinus.  The minimum heart rate was 55 bpm; the maximum 131 bpm; the average 84 bpm.  0 % of Atrial fibrillation/Atrial flutter with longest episode of 0 ms.  The total burden of AV Block present was 0 % [Complete Heart Block: 0 %; Advanced (High Grade):  0 %; 2nd Degree, Mobitz II: 0 %; 2nd Degree, Mobitz I: 0 %].  There were 0 pauses, the longest pause was 0 ms at --.  Total count of Ventricular Tachycardia (VT): 0 episode(s). Longest VT: 0 s on --. Fastest VT: -- bpm on  --.  70 supraventricular episodes were found. Longest SVT Episode 7 beats, Fastest  bpm  There were a total of 06896 PVCs with 2 morphologies and 5 couplets. Overall PVC [place] at 3.11 %  There were a total of 0 Other Beats. There were 0 total number of paced beats.  There were a total of [id number] PSVCs with 2 morphologies and 5856 couplets. Overall PSVC [place] at  19.59 %  There is a total of 5 patient events.      The 10-year ASCVD risk score (Santa Claus DK, et al., 2019) is: 30.3%    Values used to calculate the score:      Age: 79 years      Sex: Female      Is Non- : No      Diabetic: No      Tobacco smoker: No      Systolic Blood Pressure: 130 mmHg      Is BP treated: Yes      HDL Cholesterol: 40 mg/dL      Total Cholesterol: 210 mg/dL    Summary 7.2024  Show Result Comparison     Left Ventricle: The left ventricle is normal in size. Normal wall thickness. There is concentric hypertrophy. There is normal systolic function with a visually estimated ejection fraction of 55 - 70%. Ejection fraction by visual approximation is 60%. There is normal diastolic function.    Right Ventricle:  Normal right ventricular cavity size. Wall thickness is normal. Systolic function is normal.    Left Atrium: Left atrium is mildly dilated.    Aortic Valve: There is mild aortic regurgitation.    Pulmonary Artery: The estimated pulmonary artery systolic pressure is 32 mmHg.    IVC/SVC: Normal venous pressure at 3 mmHg.    Stress Protocol: The patient exercised for 4 minutes 4 seconds on a David protocol, corresponding to a functional capacity of 6METS, achieving a peak heart rate of 136 bpm, which is 99% of the age predicted maximum heart rate. The patient experienced no angina during the test. Their exercise capacity was normal. The patient reported shortness of breath during the stress test. The test was stopped because the patient requested it and they experienced fatigue and shortness of breaththe end of the protocol was reached.    Baseline ECG: The Baseline ECG reveals sinus rhythm. The axis is normal. The ST segments are normal.    Stress ECG: There are no ST segment deviation identified during the protocol. There are no arrhythmias during stress. There is normal blood pressure response with stress.    ECG Conclusion: The ECG portion of the study is negative for ischemia.    Post-stress Impression: The study is negative with no echocardiographic evidence of stress induced ischemia.    Assessment and Plan:       PVC (premature ventricular contraction)    FORREST (dyspnea on exertion)    Hyperlipidemia, unspecified hyperlipidemia type    Bradycardia    Essential hypertension    Situational anxiety    Statin intolerance              Reviewed all tests and above medical conditions with patient in detail and formulated treatment plan.  Risk factor modification discussed.   Cardiac low salt diet discussed.  Maintaining healthy weight and weight loss goals were discussed in clinic.  Continue with Toprol, HCTZ and amlodipine.  Normal stress echo reviewed  Follow up in 6 months

## 2025-01-13 ENCOUNTER — CLINICAL SUPPORT (OUTPATIENT)
Dept: PRIMARY CARE CLINIC | Facility: CLINIC | Age: 80
End: 2025-01-13
Payer: MEDICARE

## 2025-01-13 DIAGNOSIS — F43.21 GRIEF: ICD-10-CM

## 2025-01-13 DIAGNOSIS — Z63.6 CAREGIVER STRESS: Primary | ICD-10-CM

## 2025-01-13 PROCEDURE — 99499 UNLISTED E&M SERVICE: CPT | Mod: S$GLB,,,

## 2025-01-13 SDOH — SOCIAL DETERMINANTS OF HEALTH (SDOH): DEPENDENT RELATIVE NEEDING CARE AT HOME: Z63.6

## 2025-01-13 NOTE — PROGRESS NOTES
"Individual Psychotherapy ([facility])  Dalia Peña,  1/13/2025  DATE:  1/13/2025  TYPE OF VISIT:  In person  LENGTH OF SESSION: 45    Therapeutic Intervention: Met with patient for individual psychotherapy.    Chief complaint/reason for encounter: anxiety and interpersonal     Session Content/Presenting Problem:  November 2023 PHQ 2; ROSY 5  January 2025  PHQ  2 ; ROSY 2    Patient expresses optimism in her grief process; feels she is doing well.  She talks about the closeness she shared with her  in their long marriage. They enjoyed each other and relied on each other. Patient keeps herself ot a daily schedule; she has completed much of the paperwork for probate. She is getting things organized to complete their taxes.  She enjoys the time she spends in her office.  She and Neal enjoy talking about Satnam regularly; they have an automatic picture frame that scrolls through pictures of the 3 of them together.  She believes Neal is adjusting okay to Satnam's death.  Patient feels she is sleeping well; she has some nights when she tosses and turns but this is not the usual.  She has subscribed to the Calm steven and is using it for exercise routines.  She expresses hope that she will be able to travel in the future as she and Satnam had planned for.  She is encouraged to consider trips she wants to take without Neal.  Patient describes her attitude right now as "one day at a time."  She is consciously trying not to push herself to do too much.  She believes the Zoloft has been helpful.  She speaks with her son every day.  Patient is encouraged to do more activities for herself; patient names going to the library and to target as things she does for herself.  She enjoys reading prayer books and motivational books, doing puzzles.  She says Neal is able to entertain himself with drawing, building models, caring for his reptiles.  Patient requests continued visits for social support as she adjusts to life without her " .       Past Sessions:   Met with patient for first session since last July.  Patient reports that her ,Satnam  about 6 weeks ago.  Says the last month of his life was very difficult.  He became short of breath and he was admitted to the hospital.  He was discharged home; had gotten a lift chair and a hospital bed, home oxygen. Had a neighbor help him into the house. Used his walker to get up and he fell so had to call 911 again. [place] [facility] [place]; he  2 days later. He suffered so much in the end; it was not a good experience.  Satnam had asked a  friend to come see him a few weeks before he ; said confession and got last rites.  Still feels unreal; Neal thinks he hears him sometimes. Patient believes she had not been aware of Satnam's decline; she had not expected him to die. She reports feeling surprised when the ICU doctor told her that Satnam that was dying.   Felt extremely fatigued; dreaded getting through the ; her son did make it back for the .  Jorge had also come home a few weeks prior to help her with Neal. He stayed for a few weeks after the  to help her out.  Patient says she is starting to feel better; she was able to get her bedroom back in working order after they got all of the DME moved out. She describes having moments of fear where she doubts her ability to manage without Satnam.  Supported patient in her feelings of grief and fear; patient was  to Satnam for the majority of her life. Urged her to be easy on herself; normalized her feelings of fatigue as a sign of grieving.  Discussed the possibility of her using resources such as [facility] and grief support groups in the area for support for her and for Neal.  Patient reports increasing difficulty in managing Neal. He is 13 and has told her he no longer has to do what she says.  Finds herself often negotiating with Neal to get him to go to bed or  do things around the house. She did tell him to get himself up and dressed and to the bus one morning.  He was mostly able to do this with some direction from her.  Discussed her desire to allow Neal to be more independent.  Neal is currently falling behind in his classes; he had previously been held back and may need to be held back again.  Discussed the plan for Neal to see a new psychiatrist; encouraged patient to have Neal tested for learning disabilities, ADHD, etcetera and requesting services based on his diagnoses.  She will discuss with the new doctor. Neal is now seeing a male counselor which seems to be good for him.   Patient expresses her worry that she will not have the energy to continue to care for Neal as he gets older.  Already is unwilling to take him anywhere at night.  Neither of them feel safe going out at night. Satnam had been able to manage Neal and take him to boy scouts and other activities before his illness.  She is unhappy that the school has not been more aware of Neal's problems; urged her to schedule a meeting with the counselor, teachers, etc to discuss Neal.  Neal has had health issues recently that have caused him to fall further behind.  Some concern that he may not be allowed to continue at [employer].  Patient is hopeful that she will start to reconnect with [employer] friends. She has been helped by some good neighbors.  Satnam's older brother calls to check on her; he is 87 and limited in what he can do for her.  Patient has pulled away from managing her sister's issues.  Is letting her niece care for her now.  Her niece stops in to see her now and then also.   Looking forward to going to [place] to see Jorge at [facility].  Hopeful that they will have a good time with Jorge. Sad that he is selling the historic house he has lived in with his wife as they prepare to divorce.  She describes Jorge as also emotionally and physically drained with all he has been going through.   Support  and empathy provided to patient. Normalized her feelings of grief and fatigue;encouraged patient to continue to reach out to her good friends for support.    Will return in one month for follow up.       Current symptoms:  Depression: hopelessness and fatigue.  Anxiety: excessive worrying.  Insomnia: non-restful sleep.  Deneen:  denies.  Psychosis: denies .      Risk parameters:  Patient reports no suicidal ideation  Patient reports no homicidal ideation  Patient reports no self-injurious behavior  Patient reports no violent behavior    MENTAL HEALTH STATUS EXAM  General Appearance:  unremarkable, age appropriate   Speech: normal tone, normal rate, normal pitch, normal volume      Level of Cooperation: cooperative      Thought Processes: normal and logical   Mood: steady      Thought Content: normal, no suicidality, no homicidality, delusions, or paranoia   Affect: congruent and appropriate   Orientation: Oriented x3   Attention Span & Concentration: intact   Fund of General Knowledge: intact and appropriate to age and level of education   Judgment & Insight: good     Language  intact       STRENGTHS AND LIABILITIES: Strength: Patient accepts guidance/feedback, Strength: Patient is expressive/articulate., Strength: Patient is intelligent., Strength: Patient is stable.    IMPRESSION:   My diagnostic impression is  situational anxiety and caregiver stress , as evidenced by patient report of appropriately feeling stress due to caring for her 11 yo grandson with special needs and her sister with dementia.  Patient is also coping with the death of her  in late 2024 to cancer.     TREATMENT GOALS (per patient):    To manage worry related to raising her 12 year old grandson, Neal. To grieve the loss of her  to cancer.      Treatment plan:  Target symptoms: anxiety , adjustment  Why chosen therapy is appropriate versus another modality: relevant to diagnosis, patient responds to this modality, evidence based  practice  Outcome monitoring methods: self-report, observation, checklist/rating scale  Therapeutic intervention type: insight oriented psychotherapy    Patient's response to intervention:  The patient's response to intervention is accepting.    Progress toward goals and other mental status changes:  The patient's progress toward goals is good.    Plan: Pt plans to continue individual psychotherapy CBT will be utilized in future individual therapy sessions to increase interaction, insight, support, and parent/behavior management.     Return to clinic: 2 months 3/13/2025

## 2025-01-30 ENCOUNTER — OFFICE VISIT (OUTPATIENT)
Dept: PULMONOLOGY | Facility: CLINIC | Age: 80
End: 2025-01-30
Payer: MEDICARE

## 2025-01-30 VITALS
RESPIRATION RATE: 16 BRPM | DIASTOLIC BLOOD PRESSURE: 74 MMHG | HEIGHT: 66 IN | SYSTOLIC BLOOD PRESSURE: 122 MMHG | OXYGEN SATURATION: 97 % | WEIGHT: 172.63 LBS | BODY MASS INDEX: 27.74 KG/M2 | HEART RATE: 72 BPM

## 2025-01-30 DIAGNOSIS — G47.33 OBSTRUCTIVE SLEEP APNEA: Primary | ICD-10-CM

## 2025-01-30 DIAGNOSIS — G47.33 OSA ON CPAP: ICD-10-CM

## 2025-01-30 PROCEDURE — 1126F AMNT PAIN NOTED NONE PRSNT: CPT | Mod: CPTII,S$GLB,, | Performed by: HOSPITALIST

## 2025-01-30 PROCEDURE — 3288F FALL RISK ASSESSMENT DOCD: CPT | Mod: CPTII,S$GLB,, | Performed by: HOSPITALIST

## 2025-01-30 PROCEDURE — 1160F RVW MEDS BY RX/DR IN RCRD: CPT | Mod: CPTII,S$GLB,, | Performed by: HOSPITALIST

## 2025-01-30 PROCEDURE — 1159F MED LIST DOCD IN RCRD: CPT | Mod: CPTII,S$GLB,, | Performed by: HOSPITALIST

## 2025-01-30 PROCEDURE — 1157F ADVNC CARE PLAN IN RCRD: CPT | Mod: CPTII,S$GLB,, | Performed by: HOSPITALIST

## 2025-01-30 PROCEDURE — 3078F DIAST BP <80 MM HG: CPT | Mod: CPTII,S$GLB,, | Performed by: HOSPITALIST

## 2025-01-30 PROCEDURE — 1101F PT FALLS ASSESS-DOCD LE1/YR: CPT | Mod: CPTII,S$GLB,, | Performed by: HOSPITALIST

## 2025-01-30 PROCEDURE — 3074F SYST BP LT 130 MM HG: CPT | Mod: CPTII,S$GLB,, | Performed by: HOSPITALIST

## 2025-01-30 PROCEDURE — 99999 PR PBB SHADOW E&M-EST. PATIENT-LVL IV: CPT | Mod: PBBFAC,,, | Performed by: HOSPITALIST

## 2025-01-30 PROCEDURE — 99212 OFFICE O/P EST SF 10 MIN: CPT | Mod: S$GLB,,, | Performed by: HOSPITALIST

## 2025-01-30 NOTE — PROGRESS NOTES
Subjective:      Patient ID: Dalia Peña is a 79 y.o. female.    Chief Complaint: JESUS    Interval Hx 1/30/25:    Mrs. Peña presents today for follow up sleep apnea. She was last seen 7/2024- pt compliant, but frustrated with use- discussed positional therapy when not able to use CPAP, information was provided for MAS.     Last used CPAP in November, was going through a lot at the end of her 's terminal illness. Is motivated to get back to using- recounts that when she was compliant experienced better quality sleep, didn't wake up as much to use the restroom. Needs new supplies, shipped today. Wants to go back to CPAP.    HPI 7/31/24:     78 year old female with history of HTN, HLD, JESUS who presents to Pulmonary clinic for initial cpap compliance review. She was last seen 2/2024 by Jessica Bajwa NP when HST was ordered which showed moderate sleep apnea. CPAP was ordered.      Mrs. 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 In regards to the mask itself, she reports no issues. The pressures for the CPAP were initially too high and range changed to 4-8cmH2O. In regards to her insomnia- has addressed sleep hygiene, she has used the Calm steven, does not drink caffeine, and has taken other measures and will still at times have difficulty falling asleep. She does enjoy taking a 45 minute nap on weekdays when her son is in school.     Pertinent Work Up:  2 night HST 5/2024- AHI 17, SpO2 cecilia 81%- positional with non-supine AHI 2    Review of Systems   Respiratory:  Positive for snoring and somnolence.      Objective:     Physical Exam   Constitutional: She is oriented to person, place, and time. She appears well-developed and well-nourished. She is not obese.  "  Cardiovascular: Normal rate and regular rhythm.   Pulmonary/Chest: Normal expansion, effort normal and breath sounds normal.   Neurological: She is alert and oriented to person, place, and time.   Skin: Skin is warm and dry.     Personal Diagnostic Review  As Above      1/9/2025     9:48 AM 12/9/2024     8:55 AM 9/26/2024     1:49 PM 8/28/2024     1:03 PM 7/31/2024     8:32 AM 7/2/2024     9:45 AM 6/21/2024    10:09 AM   Pulmonary Function Tests   SpO2 97 % 96 % 96 % 95 % 96 %  97 %   Height  5' 6" (1.676 m) 5' 6" (1.676 m) 5' 6" (1.676 m) 5' 6" (1.676 m) 5' 6" (1.676 m) 5' 6" (1.676 m)   Weight 79.3 kg (174 lb 13.2 oz) 78.3 kg (172 lb 8.2 oz) 80.1 kg (176 lb 9.4 oz) 80.7 kg (177 lb 14.6 oz) 80.1 kg (176 lb 9.4 oz) 80.3 kg (177 lb) 80.6 kg (177 lb 9.3 oz)   BMI (Calculated)  27.9 28.5 28.7 28.5 28.6 28.7        Assessment:     No diagnosis found.     Outpatient Encounter Medications as of 1/30/2025   Medication Sig Dispense Refill    amLODIPine (NORVASC) 5 MG tablet       busPIRone (BUSPAR) 5 MG Tab Take 1 tablet (5 mg total) by mouth 2 (two) times daily. 60 tablet 3    hydroCHLOROthiazide (HYDRODIURIL) 12.5 MG Tab Take 1 tablet (12.5 mg total) by mouth once daily. 90 tablet 3    magnesium oxide (MAG-OX) 400 mg (241.3 mg magnesium) tablet Take 400 mg by mouth once daily.      metoprolol succinate (TOPROL-XL) 25 MG 24 hr tablet TAKE ONE TABLET BY MOUTH EVERY DAY 90 tablet 3    omeprazole (PRILOSEC) 20 MG capsule Take 20 mg by mouth once daily.      sertraline (ZOLOFT) 50 MG tablet Take 1 tablet (50 mg total) by mouth once daily. 30 tablet 11    traZODone (DESYREL) 50 MG tablet Take 1 tablet (50 mg total) by mouth every evening. 30 tablet 11    vitamin D (VITAMIN D3) 1000 units Tab Take 1,000 Units by mouth once daily.      [DISCONTINUED] metoprolol succinate (TOPROL-XL) 25 MG 24 hr tablet Take 1 tablet (25 mg total) by mouth once daily. 30 tablet 11     No facility-administered encounter medications on file as of " 1/30/2025.     No orders of the defined types were placed in this encounter.        Plan:     Problem List Items Addressed This Visit       JESUS on CPAP     - supplies shipped today  - supply order placed  - pt motivated to get back to therapy  - set reminder for myself to check compliance in 3 months and message patient if any corrections need to be made, otherwise follow up in 6 months          Other Visit Diagnoses       Obstructive sleep apnea    -  Primary    Relevant Orders    CPAP/BIPAP SUPPLIES          Follow up in 6 months or sooner as needed

## 2025-01-30 NOTE — ASSESSMENT & PLAN NOTE
- supplies shipped today  - supply order placed  - pt motivated to get back to therapy  - set reminder for myself to check compliance in 3 months and message patient if any corrections need to be made, otherwise follow up in 6 months

## 2025-03-12 NOTE — PROGRESS NOTES
Individual Psychotherapy ([facility])  Dalia Peña,  3/13/2025  DATE:  3/13/2025  TYPE OF VISIT:  In person  LENGTH OF SESSION: 45    Therapeutic Intervention: Met with patient for individual psychotherapy.    Chief complaint/reason for encounter: anxiety and interpersonal     Session Content/Presenting Problem:  November 2023 PHQ 2; ROSY 5  January 2025  PHQ  2 ; ROSY 2    Met with patient for 2 month follow up.  Patient reports doing okay since her 's death, but often feels tired.  Says she is very busy caring for her 13 year old grandson Neal, helping her elderly disabled sister and trying to make time for herself.  Patient is supported in all of the roles she is maintaining.  Patient is commended for the proactive steps she has made since her 's death.  She has finalized the gravestone.  She has been working with a probate .  She is planning to go through Satnam's personal possessions to give them away.  Patient notes that she has been feeling very tired.  Her grandson has regularly been sleeping in her bedroom.  She has not been able to get him off of his Ipad at night so she does not sleep well.  Discussed possible options she may consider to enforce rules regarding the use of the Ipad.  Patient finds it difficult to enforce rules with her grandson.  She often relies on her son Rashaad to talk to Neal.  This does not always work as Neal can become stubborn if Rashaad is too forceful with him. Discussed Neal's developmental age; he is 13 years old but in some ways he is developmentally delayed.  Encouraged her to think of him as about age 6 or 7 developmentally.  Normalized some of his behavior as typical of any 12 or 13 year old also.  Patient notes that when on a trip recently, she made 3 trips back and forth to the car to unload.  Encouraged her to work on being more direct with Neal in telling him what she needs him to do.  Patient has been trying to do some things for herself; she is  "taking an [employer] class; she sees her friends.  She is working on emptying out a shed of old family possessions.   Patient expresses the hope that she can enjoy some of her "bucket list" items while she can. Encouraged her to look for opportunities to make those times happen and to let go of other things.  Patient admits she has not considered getting help with cleaning and housework for her sister.  She has not looked into finding professional organizers that may be able to help with decluttering.   She has looked into hiring a  for Neal; he may have to repeat the 7th grade.  She has turned off news alerts on her phone and has stopped watching the news in the mornings; using that time for prayer instead.  Patient is commended for making these steps to remove stress from her daily life.   Patient has decided that she would like to move to [place]. She and Neal can live with Rashaad.  Has ideas on renovating the house to make it more accessible for her.  Discusses the ways in which [place] may be a much better environment for them.  Will be there in [place] in June/July.  Planning to ask [place] [employer] for help with ideas on converting Rashaad's house to make it more usable for her.    Patient appears to be doing well in managing her grief.  She is looking forward and making plans to benefit herself and Neal in the future.          Past Sessions:   Patient expresses optimism in her grief process; feels she is doing well.  She talks about the closeness she shared with her  in their long marriage. They enjoyed each other and relied on each other. Patient keeps herself ot a daily schedule; she has completed much of the paperwork for probate. She is getting things organized to complete their taxes.  She enjoys the time she spends in her office.  She and Neal enjoy talking about Satnam regularly; they have an automatic picture frame that scrolls through pictures of the 3 of them together.  She believes Neal is " "adjusting okay to Satnam's death.  Patient feels she is sleeping well; she has some nights when she tosses and turns but this is not the usual.  She has subscribed to the Calm steven and is using it for exercise routines.  She expresses hope that she will be able to travel in the future as she and Satnam had planned for.  She is encouraged to consider trips she wants to take without Neal.  Patient describes her attitude right now as "one day at a time."  She is consciously trying not to push herself to do too much.  She believes the Zoloft has been helpful.  She speaks with her son every day.  Patient is encouraged to do more activities for herself; patient names going to the library and to target as things she does for herself.  She enjoys reading prayer books and motivational books, doing puzzles.  She says Neal is able to entertain himself with drawing, building models, caring for his reptiles.  Patient requests continued visits for social support as she adjusts to life without her .     Current symptoms:  Depression: hopelessness and fatigue.  Anxiety: excessive worrying.  Insomnia: non-restful sleep.  Deneen:  denies.  Psychosis: denies .      Risk parameters:  Patient reports no suicidal ideation  Patient reports no homicidal ideation  Patient reports no self-injurious behavior  Patient reports no violent behavior    MENTAL HEALTH STATUS EXAM  General Appearance:  unremarkable, age appropriate   Speech: normal tone, normal rate, normal pitch, normal volume      Level of Cooperation: cooperative      Thought Processes: normal and logical   Mood: steady      Thought Content: normal, no suicidality, no homicidality, delusions, or paranoia   Affect: congruent and appropriate   Orientation: Oriented x3   Attention Span & Concentration: intact   Fund of General Knowledge: intact and appropriate to age and level of education   Judgment & Insight: good     Language  intact       STRENGTHS AND LIABILITIES: Strength: " Patient accepts guidance/feedback, Strength: Patient is expressive/articulate., Strength: Patient is intelligent., Strength: Patient is stable.    IMPRESSION:   My diagnostic impression is  situational anxiety and caregiver stress , as evidenced by patient report of appropriately feeling stress due to caring for her 11 yo grandson with special needs and her sister with dementia.  Patient is also coping with the death of her  in late 2024 to cancer.     TREATMENT GOALS (per patient):    To manage worry related to raising her 12 year old grandson, Neal. To grieve the loss of her  to cancer.      Treatment plan:  Target symptoms: anxiety , adjustment  Why chosen therapy is appropriate versus another modality: relevant to diagnosis, patient responds to this modality, evidence based practice  Outcome monitoring methods: self-report, observation, checklist/rating scale  Therapeutic intervention type: insight oriented psychotherapy    Patient's response to intervention:  The patient's response to intervention is accepting.    Progress toward goals and other mental status changes:  The patient's progress toward goals is good.    Plan: Pt plans to continue individual psychotherapy CBT will be utilized in future individual therapy sessions to increase interaction, insight, support, and parent/behavior management.     Return to clinic: 2 months May 15th

## 2025-03-13 ENCOUNTER — CLINICAL SUPPORT (OUTPATIENT)
Dept: PRIMARY CARE CLINIC | Facility: CLINIC | Age: 80
End: 2025-03-13
Payer: MEDICARE

## 2025-03-13 DIAGNOSIS — Z63.6 CAREGIVER STRESS: Primary | ICD-10-CM

## 2025-03-13 DIAGNOSIS — F43.21 GRIEF: ICD-10-CM

## 2025-03-13 SDOH — SOCIAL DETERMINANTS OF HEALTH (SDOH): DEPENDENT RELATIVE NEEDING CARE AT HOME: Z63.6

## 2025-03-13 NOTE — PROGRESS NOTES
Subjective:      Patient ID: Dalia Peña is a 79 y.o. female.    Chief Complaint: Medication Problem and Follow-up      HPI  Here for f/u medical problems and preventive exam.  Lots stressors, caring for 12yo.  Lots stressors with estate of , and 12yo acting out and failing classes.  Walking now for exercise.  Counseling is helping her, along with meds.  No f/c/sw/cough.  No cp/sob/palp.  BMs good with Magnesium, urine normal.          Advance Care Planning     Date: 03/14/2025    ACP Reviewed/No Changes  Voluntary advance care planning discussion had today with patient. Previously completed HCPOA in electronic medical record is current, no changes made.      A total of 5 min was spent on advance care planning, goals of care discussion, emotional support, formulating and communicating prognosis and exploring burden/benefit of various approaches of treatment. This discussion occurred on a fully voluntary basis with the verbal consent of the patient and/or family.           HM:  10/24 fluvax, 5/21 covid vaccines, 8/24 HAV, 2/17 [id number], 11/18 [id number], 3/25 today [id number], 3/23 Tdap, 2022 Shingrix x2, 9/24 MMG, 9/24 BMD rep 2y, Cscope at 71yo rep 10y, 7/24 stress ECHO negative.      Review of Systems   Constitutional:  Negative for appetite change, chills, diaphoresis and fever.   HENT:  Negative for congestion, ear pain, rhinorrhea, sinus pressure and sore throat.    Respiratory:  Negative for cough, chest tightness and shortness of breath.    Cardiovascular:  Negative for chest pain and palpitations.   Gastrointestinal:  Negative for blood in stool, constipation, diarrhea, nausea and vomiting.   Genitourinary:  Negative for dysuria, frequency, hematuria, menstrual problem, urgency and vaginal discharge.   Musculoskeletal:  Negative for arthralgias.   Skin:  Negative for rash.   Neurological:  Negative for dizziness and headaches.   Psychiatric/Behavioral:  Negative for sleep disturbance. The patient  "is not nervous/anxious.          Objective:   BP (!) 112/58 (BP Location: Right arm, Patient Position: Sitting)   Pulse 63   Temp 97 °F (36.1 °C) (Skin)   Ht 5' 6" (1.676 m)   Wt 80 kg (176 lb 7.7 oz)   SpO2 96%   BMI 28.48 kg/m²     Physical Exam  Constitutional:       Appearance: She is well-developed.   HENT:      Right Ear: External ear normal. Tympanic membrane is not injected.      Left Ear: External ear normal. Tympanic membrane is not injected.   Eyes:      Conjunctiva/sclera: Conjunctivae normal.   Neck:      Thyroid: No thyromegaly.   Cardiovascular:      Rate and Rhythm: Normal rate and regular rhythm.      Heart sounds: No murmur heard.     No friction rub. No gallop.   Pulmonary:      Effort: Pulmonary effort is normal.      Breath sounds: Normal breath sounds. No wheezing or rales.   Abdominal:      General: Bowel sounds are normal.      Palpations: Abdomen is soft. There is no mass.      Tenderness: There is no abdominal tenderness.   Musculoskeletal:      Cervical back: Normal range of motion and neck supple.   Lymphadenopathy:      Cervical: No cervical adenopathy.   Skin:     General: Skin is warm.      Findings: No rash.   Neurological:      Mental Status: She is alert and oriented to person, place, and time.             Assessment:       1. Essential hypertension    2. Other hyperlipidemia    3. Current moderate episode of major depressive disorder without prior episode    4. Primary insomnia    5. Situational anxiety    6. Osteopenia with high risk of fracture    7. JESUS on CPAP    8. Preventive measure          Plan:     1. Essential hypertension- stop HCTZ, RTC 1wk with home BP monitor for check.  Overview:  Amlod 5mg,   metoprolol XL 25mg,   hctz 12.5mg daily.    2. Other hyperlipidemia- cont exercise, recheck now.  -     CBC Auto Differential; Future; Expected date: 03/14/2025  -     Comprehensive Metabolic Panel; Future; Expected date: 03/14/2025  -     Lipid Panel; Future; Expected " date: 03/14/2025  -     TSH; Future; Expected date: 03/14/2025    3. Current moderate episode of major depressive disorder without prior episode  Overview:  Zoloft 50mg daily.    4. Primary insomnia- doing well.  Overview:  Trazodone 50mg nightly.    5. Situational anxiety- cont rx and counseling.  Overview:  Buspar prn.    6. Osteopenia with high risk of fracture- cont resistance training.  Overview:  FINDINGS:  The L1 to L4 vertebral bone mineral density is equal to 1.202 g/cm squared with a T score of 0.1.     The left femoral neck bone mineral density is equal to 0.806 g/cm squared with a T score of -1.7.   There is a 12.8% risk of a major osteoporotic fracture and a 3% risk of hip fracture in the next 10 years (FRAX).   Impression:   Osteopenia    7. JESUS on CPAP, doing well, cont.  Overview:  2 night HST 5/2024- AHI 17, SpO2 cecilia 81%- positional with non-supine AHI 2    Heat and occas ibuprofen for left side pain on and off, since lifting a heavy bag.  RTC 3mo also.      8. Preventive measure  -     Pneumococcal Conjugate Vaccine (20 Valent) (IM)(Preferred)

## 2025-03-14 ENCOUNTER — OFFICE VISIT (OUTPATIENT)
Dept: PRIMARY CARE CLINIC | Facility: CLINIC | Age: 80
End: 2025-03-14
Payer: MEDICARE

## 2025-03-14 VITALS
DIASTOLIC BLOOD PRESSURE: 58 MMHG | TEMPERATURE: 97 F | BODY MASS INDEX: 28.37 KG/M2 | OXYGEN SATURATION: 96 % | HEIGHT: 66 IN | WEIGHT: 176.5 LBS | HEART RATE: 63 BPM | SYSTOLIC BLOOD PRESSURE: 112 MMHG

## 2025-03-14 DIAGNOSIS — M85.80 OSTEOPENIA WITH HIGH RISK OF FRACTURE: ICD-10-CM

## 2025-03-14 DIAGNOSIS — F32.1 CURRENT MODERATE EPISODE OF MAJOR DEPRESSIVE DISORDER WITHOUT PRIOR EPISODE: ICD-10-CM

## 2025-03-14 DIAGNOSIS — E78.49 OTHER HYPERLIPIDEMIA: ICD-10-CM

## 2025-03-14 DIAGNOSIS — F51.01 PRIMARY INSOMNIA: ICD-10-CM

## 2025-03-14 DIAGNOSIS — G47.33 OSA ON CPAP: ICD-10-CM

## 2025-03-14 DIAGNOSIS — I10 ESSENTIAL HYPERTENSION: Primary | ICD-10-CM

## 2025-03-14 DIAGNOSIS — F41.8 SITUATIONAL ANXIETY: ICD-10-CM

## 2025-03-14 DIAGNOSIS — Z29.9 PREVENTIVE MEASURE: ICD-10-CM

## 2025-03-14 PROBLEM — Z12.31 ENCOUNTER FOR SCREENING MAMMOGRAM FOR MALIGNANT NEOPLASM OF BREAST: Status: RESOLVED | Noted: 2024-06-21 | Resolved: 2025-03-14

## 2025-03-14 PROCEDURE — 99999 PR PBB SHADOW E&M-EST. PATIENT-LVL III: CPT | Mod: PBBFAC,,, | Performed by: INTERNAL MEDICINE

## 2025-03-14 PROCEDURE — 80061 LIPID PANEL: CPT | Performed by: INTERNAL MEDICINE

## 2025-03-14 PROCEDURE — 85025 COMPLETE CBC W/AUTO DIFF WBC: CPT | Performed by: INTERNAL MEDICINE

## 2025-03-14 PROCEDURE — 84443 ASSAY THYROID STIM HORMONE: CPT | Performed by: INTERNAL MEDICINE

## 2025-03-14 PROCEDURE — 80053 COMPREHEN METABOLIC PANEL: CPT | Performed by: INTERNAL MEDICINE

## 2025-03-14 NOTE — PROGRESS NOTES
Prevnar 20 vaccine given , tolerated well, allergies and medication verified, no pain, 15 min wait instructed. PHUC

## 2025-03-15 LAB
ALBUMIN SERPL BCP-MCNC: 3.8 G/DL (ref 3.5–5.2)
ALP SERPL-CCNC: 82 U/L (ref 40–150)
ALT SERPL W/O P-5'-P-CCNC: 16 U/L (ref 10–44)
ANION GAP SERPL CALC-SCNC: 8 MMOL/L (ref 8–16)
AST SERPL-CCNC: 20 U/L (ref 10–40)
BASOPHILS # BLD AUTO: 0.05 K/UL (ref 0–0.2)
BASOPHILS NFR BLD: 0.8 % (ref 0–1.9)
BILIRUB SERPL-MCNC: 0.4 MG/DL (ref 0.1–1)
BUN SERPL-MCNC: 21 MG/DL (ref 8–23)
CALCIUM SERPL-MCNC: 9.5 MG/DL (ref 8.7–10.5)
CHLORIDE SERPL-SCNC: 105 MMOL/L (ref 95–110)
CHOLEST SERPL-MCNC: 204 MG/DL (ref 120–199)
CHOLEST/HDLC SERPL: 4.4 {RATIO} (ref 2–5)
CO2 SERPL-SCNC: 27 MMOL/L (ref 23–29)
CREAT SERPL-MCNC: 0.8 MG/DL (ref 0.5–1.4)
DIFFERENTIAL METHOD BLD: ABNORMAL
EOSINOPHIL # BLD AUTO: 0.2 K/UL (ref 0–0.5)
EOSINOPHIL NFR BLD: 3 % (ref 0–8)
ERYTHROCYTE [DISTWIDTH] IN BLOOD BY AUTOMATED COUNT: 12.4 % (ref 11.5–14.5)
EST. GFR  (NO RACE VARIABLE): >60 ML/MIN/1.73 M^2
GLUCOSE SERPL-MCNC: 82 MG/DL (ref 70–110)
HCT VFR BLD AUTO: 42.9 % (ref 37–48.5)
HDLC SERPL-MCNC: 46 MG/DL (ref 40–75)
HDLC SERPL: 22.5 % (ref 20–50)
HGB BLD-MCNC: 13.2 G/DL (ref 12–16)
IMM GRANULOCYTES # BLD AUTO: 0.01 K/UL (ref 0–0.04)
IMM GRANULOCYTES NFR BLD AUTO: 0.2 % (ref 0–0.5)
LDLC SERPL CALC-MCNC: 112.6 MG/DL (ref 63–159)
LYMPHOCYTES # BLD AUTO: 2.4 K/UL (ref 1–4.8)
LYMPHOCYTES NFR BLD: 35.6 % (ref 18–48)
MCH RBC QN AUTO: 30.5 PG (ref 27–31)
MCHC RBC AUTO-ENTMCNC: 30.8 G/DL (ref 32–36)
MCV RBC AUTO: 99 FL (ref 82–98)
MONOCYTES # BLD AUTO: 0.6 K/UL (ref 0.3–1)
MONOCYTES NFR BLD: 9.1 % (ref 4–15)
NEUTROPHILS # BLD AUTO: 3.4 K/UL (ref 1.8–7.7)
NEUTROPHILS NFR BLD: 51.3 % (ref 38–73)
NONHDLC SERPL-MCNC: 158 MG/DL
NRBC BLD-RTO: 0 /100 WBC
PLATELET # BLD AUTO: 280 K/UL (ref 150–450)
PMV BLD AUTO: 9.9 FL (ref 9.2–12.9)
POTASSIUM SERPL-SCNC: 4.4 MMOL/L (ref 3.5–5.1)
PROT SERPL-MCNC: 7.4 G/DL (ref 6–8.4)
RBC # BLD AUTO: 4.33 M/UL (ref 4–5.4)
SODIUM SERPL-SCNC: 140 MMOL/L (ref 136–145)
TRIGL SERPL-MCNC: 227 MG/DL (ref 30–150)
TSH SERPL DL<=0.005 MIU/L-ACNC: 0.77 UIU/ML (ref 0.4–4)
WBC # BLD AUTO: 6.62 K/UL (ref 3.9–12.7)

## 2025-03-18 ENCOUNTER — PATIENT MESSAGE (OUTPATIENT)
Dept: PRIMARY CARE CLINIC | Facility: CLINIC | Age: 80
End: 2025-03-18
Payer: MEDICARE

## 2025-03-21 ENCOUNTER — CLINICAL SUPPORT (OUTPATIENT)
Dept: PRIMARY CARE CLINIC | Facility: CLINIC | Age: 80
End: 2025-03-21
Payer: MEDICARE

## 2025-03-21 VITALS — HEART RATE: 60 BPM | SYSTOLIC BLOOD PRESSURE: 130 MMHG | DIASTOLIC BLOOD PRESSURE: 60 MMHG

## 2025-03-21 DIAGNOSIS — I10 ESSENTIAL HYPERTENSION: Primary | ICD-10-CM

## 2025-03-21 PROCEDURE — 99999 PR PBB SHADOW E&M-EST. PATIENT-LVL II: CPT | Mod: PBBFAC,,,

## 2025-03-21 NOTE — PROGRESS NOTES
Pt identified by name & .  BP taken with clinic automatic machine, pt's personal machine, and manually.  See results below:    149/66 - clinic automatic machine    126/63 - pt's personal macine    0934 - 130/60 - manually    Pt states her BP is consistently in 140's - 150's and she is noticing some swelling to her ankles since stopping the HCTZ.    Will send results to Dr. Murguia

## 2025-03-25 RX ORDER — BUSPIRONE HYDROCHLORIDE 5 MG/1
5 TABLET ORAL 2 TIMES DAILY
Qty: 60 TABLET | Refills: 1 | Status: SHIPPED | OUTPATIENT
Start: 2025-03-25

## 2025-04-17 ENCOUNTER — PATIENT MESSAGE (OUTPATIENT)
Dept: PULMONOLOGY | Facility: CLINIC | Age: 80
End: 2025-04-17
Payer: MEDICARE

## 2025-04-22 RX ORDER — AMLODIPINE BESYLATE 5 MG/1
5 TABLET ORAL
Qty: 90 TABLET | Refills: 3 | Status: SHIPPED | OUTPATIENT
Start: 2025-04-22

## 2025-05-13 ENCOUNTER — TELEPHONE (OUTPATIENT)
Dept: PRIMARY CARE CLINIC | Facility: CLINIC | Age: 80
End: 2025-05-13
Payer: MEDICARE

## 2025-05-26 DIAGNOSIS — Z00.00 ENCOUNTER FOR MEDICARE ANNUAL WELLNESS EXAM: ICD-10-CM

## 2025-06-11 ENCOUNTER — TELEPHONE (OUTPATIENT)
Dept: PRIMARY CARE CLINIC | Facility: CLINIC | Age: 80
End: 2025-06-11
Payer: MEDICARE

## 2025-06-12 ENCOUNTER — OFFICE VISIT (OUTPATIENT)
Dept: PRIMARY CARE CLINIC | Facility: CLINIC | Age: 80
End: 2025-06-12
Payer: MEDICARE

## 2025-06-12 VITALS
WEIGHT: 181.13 LBS | HEART RATE: 63 BPM | BODY MASS INDEX: 29.11 KG/M2 | TEMPERATURE: 97 F | DIASTOLIC BLOOD PRESSURE: 54 MMHG | SYSTOLIC BLOOD PRESSURE: 120 MMHG | HEIGHT: 66 IN | OXYGEN SATURATION: 95 %

## 2025-06-12 DIAGNOSIS — Z91.81 HISTORY OF FALLING: ICD-10-CM

## 2025-06-12 DIAGNOSIS — I10 ESSENTIAL HYPERTENSION: ICD-10-CM

## 2025-06-12 DIAGNOSIS — F32.1 CURRENT MODERATE EPISODE OF MAJOR DEPRESSIVE DISORDER WITHOUT PRIOR EPISODE: Primary | ICD-10-CM

## 2025-06-12 DIAGNOSIS — G47.33 OSA ON CPAP: ICD-10-CM

## 2025-06-12 DIAGNOSIS — N76.1 SUBACUTE VAGINITIS: ICD-10-CM

## 2025-06-12 PROBLEM — N76.0 VAGINITIS: Status: ACTIVE | Noted: 2025-06-12

## 2025-06-12 PROCEDURE — 3074F SYST BP LT 130 MM HG: CPT | Mod: CPTII,S$GLB,, | Performed by: NURSE PRACTITIONER

## 2025-06-12 PROCEDURE — 1126F AMNT PAIN NOTED NONE PRSNT: CPT | Mod: CPTII,S$GLB,, | Performed by: NURSE PRACTITIONER

## 2025-06-12 PROCEDURE — 1157F ADVNC CARE PLAN IN RCRD: CPT | Mod: CPTII,S$GLB,, | Performed by: NURSE PRACTITIONER

## 2025-06-12 PROCEDURE — 1159F MED LIST DOCD IN RCRD: CPT | Mod: CPTII,S$GLB,, | Performed by: NURSE PRACTITIONER

## 2025-06-12 PROCEDURE — 99214 OFFICE O/P EST MOD 30 MIN: CPT | Mod: S$GLB,,, | Performed by: NURSE PRACTITIONER

## 2025-06-12 PROCEDURE — 3288F FALL RISK ASSESSMENT DOCD: CPT | Mod: CPTII,S$GLB,, | Performed by: NURSE PRACTITIONER

## 2025-06-12 PROCEDURE — 99999 PR PBB SHADOW E&M-EST. PATIENT-LVL IV: CPT | Mod: PBBFAC,,, | Performed by: NURSE PRACTITIONER

## 2025-06-12 PROCEDURE — 3078F DIAST BP <80 MM HG: CPT | Mod: CPTII,S$GLB,, | Performed by: NURSE PRACTITIONER

## 2025-06-12 PROCEDURE — 1101F PT FALLS ASSESS-DOCD LE1/YR: CPT | Mod: CPTII,S$GLB,, | Performed by: NURSE PRACTITIONER

## 2025-06-12 NOTE — ASSESSMENT & PLAN NOTE
Says mood is stable on Zoloft  Sleeping well  Optimistic  To resume counseling with LCSW since leg injury  Takes buspar 5 mg once daily

## 2025-06-12 NOTE — ASSESSMENT & PLAN NOTE
Reports brief itching to vulva intermittent  Small volume green appearing discharge intermittently  If noted, RTC for vaginal swabl  Denies urinary symptoms

## 2025-06-12 NOTE — PROGRESS NOTES
Dalia FRANK Peña  06/12/2025  [id number]    Giselle Murguia MD  Patient Care Team:  Giselle Murguia MD as PCP - General (Internal Medicine)    Future Appointments   Date Time Provider Department Center   6/19/2025  9:00 AM Mindy Street LCSW [id number] [place]   7/10/2025  1:40 PM Vianney Bui MD [facility] CARDIO [facility]   7/30/2025 10:30 AM Radha Leavitt PA-C [facility] PULMS[facility]   9/4/2025 10:20 AM Giselle Murguia MD [facility] [place]       [facility]      Chief Complaint:  Chief Complaint   Patient presents with    Follow-up     3 month f/u          Assessment/Plan:  1. Current moderate episode of major depressive disorder without prior episode  Overview:  Zoloft 50mg daily.    Assessment & Plan:  Says mood is stable on Zoloft  Sleeping well  Optimistic  To resume counseling with LCSW since leg injury  Takes buspar 5 mg once daily      2. Essential hypertension  Overview:  Amlod 5mg,   metoprolol XL 25mg,   hctz 12.5mg daily.    Assessment & Plan:  Did not stop the HCTZ 12.5 mg as recommended from last visit  B/P readings are controlled  She hydrates well  Denies dizziness, lightheadedness      3. JESUS on CPAP  Overview:  2 night HST 5/2024- AHI 17, SpO2 cecilia 81%- positional with non-supine AHI 2          4. Subacute vaginitis  Assessment & Plan:  Reports brief itching to vulva intermittent  Small volume green appearing discharge intermittently  If noted, RTC for vaginal swabl  Denies urinary symptoms      5. History of falling  Assessment & Plan:  Recent right tibial fracture  S/P surgical repair in April, 2025  Weight bearing and PT in progress            Worry Score: 2  History of Present Illness:    Last visit with Dr. Murguia 3/14/2025 Lots stressors, caring for 14yo.  Lots stressors with estate of , and 14yo acting out and failing classes.  Walking now for exercise.  Counseling is helping her, along with meds.  No f/c/sw/cough.  No  cp/sob/palp.  BMs good with Magnesium, urine normal.  Stopped HCTZ 12.5 mg    Closed displaced fracture of lateral condyle of right tibia with routine healing, 5/22/25    HM:  10/24 fluvax, 5/21 covid vaccines, 8/24 HAV, 2/17 [id number], 11/18 [id number], 3/25 today [id number], 3/23 Tdap, 2022 Shingrix x2, 9/24 MMG, 9/24 BMD rep 2y, Cscope at 73yo rep 10y, 7/24 stress ECHO negative.         Ms. Peña has returned for follow up of blood pressure. Has continued the daily HCTZ 12.5 mg since visit with Dr. Murguia    BP readings home:  after BP meds - 129/67, 127/59, 130/68, 125/62, 110/67, 123/66, 142/70, 140/72, 130/71. Has not stopped the HcTZ    Plans to resume counseling with LCSW since leg injury  Appetite, eating healthy diet  Has been weight bearing for only 3 weeks.   Plans to enroll in [facility] course  Vacation planned for [place] x 2 weeks to visit her son - hoping to move to [place] to have help with her grandson  Taking buspar once in AM  Vaginitis - occasional itch and green appearing discharge. None currently  Currently with leg swelling to RLE at site of injury        Denies fever, chills, URI symptoms, chest pain, SOB, palpitations, abdominal pain, vomiting, diarrhea, no gross neuro deficits, urinary symptoms.      The following were reviewed: Active problem list, medication list, allergies, family history, social history, and Health Maintenance.     History:  Past Medical History:   Diagnosis Date    Essential (primary) hypertension     Hyperlipemia      Past Surgical History:   Procedure Laterality Date    CATARACT EXTRACTION Right     KNEE SURGERY Right 04/09/2025    none       No family history on file.  Problem List[1]  Review of patient's allergies indicates:   Allergen Reactions    Statins-hmg-coa reductase inhibitors      Muscle aches and fatigue    Sulfa (sulfonamide antibiotics) Diarrhea     Other reaction(s): GI Intolerance         PHQ-9       ROSY-7       Medications:  Medications Ordered Prior to  Encounter[2]    Medications have been reviewed and reconciled with patient at visit today.      Exam:  Vitals:    06/12/25 1001   BP: (!) 120/54   Pulse: 63   Temp: 96.6 °F (35.9 °C)     Weight: 82.2 kg (181 lb 1.7 oz)   Body mass index is 29.23 kg/m².      BP Readings from Last 3 Encounters:   06/12/25 (!) 120/54   03/21/25 130/60   03/14/25 (!) 112/58     Wt Readings from Last 3 Encounters:   06/12/25 82.2 kg (181 lb 1.7 oz)   03/14/25 80 kg (176 lb 7.7 oz)   01/30/25 78.3 kg (172 lb 9.9 oz)         Physical Exam  Vitals reviewed.   Constitutional:       General: She is not in acute distress.     Appearance: Normal appearance.   HENT:      Head: Normocephalic and atraumatic.      Nose: Nose normal.      Mouth/Throat:      Mouth: Mucous membranes are moist.   Eyes:      General: No scleral icterus.     Conjunctiva/sclera: Conjunctivae normal.   Cardiovascular:      Rate and Rhythm: Normal rate and regular rhythm.      Heart sounds: No murmur heard.  Pulmonary:      Effort: Pulmonary effort is normal. No respiratory distress.      Breath sounds: Normal breath sounds.   Abdominal:      Palpations: Abdomen is soft.      Tenderness: There is no abdominal tenderness.   Musculoskeletal:      Cervical back: Normal range of motion and neck supple.      Right lower leg: Edema present.      Left lower leg: No edema.      Comments: Ambulating with assistance of cane   Lymphadenopathy:      Cervical: No cervical adenopathy.   Skin:     General: Skin is warm and dry.   Neurological:      Mental Status: She is alert and oriented to person, place, and time. Mental status is at baseline.   Psychiatric:         Mood and Affect: Mood normal.         Thought Content: Thought content normal.              Laboratory Reviewed:     Lab Results   Component Value Date    WBC 6.62 03/14/2025    HGB 13.2 03/14/2025    HCT 42.9 03/14/2025     03/14/2025    CHOL 204 (H) 03/14/2025    TRIG 227 (H) 03/14/2025    HDL 46 03/14/2025    ALT  "16 03/14/2025    AST 20 03/14/2025     03/14/2025    K 4.4 03/14/2025     03/14/2025    CREATININE 0.8 03/14/2025    BUN 21 03/14/2025    CO2 27 03/14/2025    TSH 0.770 03/14/2025       Screening or Prevention Patient's value Goal Complete/Controlled?   HgA1C Testing and Control   No results found for: "HGBA1C"   Annually/Less than 8% No   Lipid profile : 03/14/2025 Annually Yes   LDL control Lab Results   Component Value Date    LDLCALC 112.6 03/14/2025    Annually/Less than 100 mg/dl  No   Nephropathy screening No results found for: "LABMICR"  Lab Results   Component Value Date    PROTEINUA Negative 09/25/2024    Annually Yes   Blood pressure BP Readings from Last 1 Encounters:   06/12/25 (!) 120/54    Less than 140/90 Yes   Dilated retinal exam Most Recent Eye Exam Date: Not Found Annually Yes   Foot exam   Most Recent Foot Exam Date: Not Found Annually Yes       Health Maintenance  Health Maintenance Topics with due status: Not Due       Topic Last Completion Date    TETANUS VACCINE 03/10/2023    DEXA Scan 09/25/2024    Lipid Panel 03/14/2025     Health Maintenance Due   Topic Date Due    COVID-19 Vaccine (3 - 2024-25 season) 09/01/2024               -Patient's lab results were reviewed and discussed with patient  -Treatment options and alternatives were discussed with the patient. Patient expressed understanding. Patient was given the opportunity to ask questions and be an active participant in their medical care. Patient had no further questions or concerns at this time.               After visit summary printed and given to patient upon discharge.  Patient goals and care plan are included in After visit summary.      The following issues were discussed: The primary encounter diagnosis was Current moderate episode of major depressive disorder without prior episode. Diagnoses of Essential hypertension, JESUS on CPAP, Subacute vaginitis, and History of falling were also pertinent to this " visit.    Health maintenance needs, recent test results and goals of care discussed with pt and questions answered.           JOANIE Galarza, NP-C  [facility] [address] [place] [address]          [1]   Patient Active Problem List  Diagnosis    Essential hypertension    Other hyperlipidemia    Situational anxiety    Osteopenia with high risk of fracture    Abnormality of gait and mobility    Caregiver stress    History of nonmelanoma skin cancer    History of dysplastic nevus    Family history of coronary artery disease    Symptomatic PVCs    Primary insomnia    Statin myopathy    JESUS on CPAP    History of falling    Statin intolerance    Current moderate episode of major depressive disorder without prior episode    UTI (urinary tract infection)    Vaginitis   [2]   Current Outpatient Medications on File Prior to Visit   Medication Sig Dispense Refill    amLODIPine (NORVASC) 5 MG tablet TAKE ONE TABLET BY MOUTH EVERY DAY 90 tablet 3    busPIRone (BUSPAR) 5 MG Tab Take 1 tablet (5 mg total) by mouth 2 (two) times daily. 60 tablet 1    magnesium oxide (MAG-OX) 400 mg (241.3 mg magnesium) tablet Take 400 mg by mouth once daily.      metoprolol succinate (TOPROL-XL) 25 MG 24 hr tablet TAKE ONE TABLET BY MOUTH EVERY DAY 90 tablet 3    omeprazole (PRILOSEC) 20 MG capsule Take 20 mg by mouth once daily.      sertraline (ZOLOFT) 50 MG tablet Take 1 tablet (50 mg total) by mouth once daily. 30 tablet 11    traZODone (DESYREL) 50 MG tablet Take 1 tablet (50 mg total) by mouth every evening. 30 tablet 11    vitamin D (VITAMIN D3) 1000 units Tab Take 1,000 Units by mouth once daily.       No current facility-administered medications on file prior to visit.

## 2025-06-12 NOTE — ASSESSMENT & PLAN NOTE
Recent left tibial fracture  S/P surgical repair in April, 2025  Weight bearing and PT in progress

## 2025-06-12 NOTE — ASSESSMENT & PLAN NOTE
Did not stop the HCTZ 12.5 mg as recommended from last visit  B/P readings are controlled  She hydrates well  Denies dizziness, lightheadedness
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
## 2025-06-19 ENCOUNTER — CLINICAL SUPPORT (OUTPATIENT)
Dept: PRIMARY CARE CLINIC | Facility: CLINIC | Age: 80
End: 2025-06-19
Payer: MEDICARE

## 2025-06-19 DIAGNOSIS — Z63.6 CAREGIVER STRESS: Primary | ICD-10-CM

## 2025-06-19 PROCEDURE — 99499 UNLISTED E&M SERVICE: CPT | Mod: S$GLB,,,

## 2025-06-19 SDOH — SOCIAL DETERMINANTS OF HEALTH (SDOH): DEPENDENT RELATIVE NEEDING CARE AT HOME: Z63.6

## 2025-07-10 ENCOUNTER — OFFICE VISIT (OUTPATIENT)
Dept: CARDIOLOGY | Facility: CLINIC | Age: 80
End: 2025-07-10
Payer: MEDICARE

## 2025-07-10 VITALS
OXYGEN SATURATION: 95 % | SYSTOLIC BLOOD PRESSURE: 125 MMHG | BODY MASS INDEX: 28.88 KG/M2 | HEIGHT: 66 IN | WEIGHT: 179.69 LBS | DIASTOLIC BLOOD PRESSURE: 69 MMHG | HEART RATE: 65 BPM

## 2025-07-10 DIAGNOSIS — R26.89 IMBALANCE: ICD-10-CM

## 2025-07-10 DIAGNOSIS — I49.3 PVC (PREMATURE VENTRICULAR CONTRACTION): Primary | ICD-10-CM

## 2025-07-10 DIAGNOSIS — Z78.9 STATIN INTOLERANCE: ICD-10-CM

## 2025-07-10 DIAGNOSIS — E78.5 HYPERLIPIDEMIA, UNSPECIFIED HYPERLIPIDEMIA TYPE: ICD-10-CM

## 2025-07-10 DIAGNOSIS — I10 ESSENTIAL HYPERTENSION: ICD-10-CM

## 2025-07-10 PROCEDURE — 99999 PR PBB SHADOW E&M-EST. PATIENT-LVL III: CPT | Mod: PBBFAC,,, | Performed by: INTERNAL MEDICINE

## 2025-07-10 PROCEDURE — 1159F MED LIST DOCD IN RCRD: CPT | Mod: CPTII,S$GLB,, | Performed by: INTERNAL MEDICINE

## 2025-07-10 PROCEDURE — 3078F DIAST BP <80 MM HG: CPT | Mod: CPTII,S$GLB,, | Performed by: INTERNAL MEDICINE

## 2025-07-10 PROCEDURE — 1157F ADVNC CARE PLAN IN RCRD: CPT | Mod: CPTII,S$GLB,, | Performed by: INTERNAL MEDICINE

## 2025-07-10 PROCEDURE — 3074F SYST BP LT 130 MM HG: CPT | Mod: CPTII,S$GLB,, | Performed by: INTERNAL MEDICINE

## 2025-07-10 PROCEDURE — 3288F FALL RISK ASSESSMENT DOCD: CPT | Mod: CPTII,S$GLB,, | Performed by: INTERNAL MEDICINE

## 2025-07-10 PROCEDURE — 1126F AMNT PAIN NOTED NONE PRSNT: CPT | Mod: CPTII,S$GLB,, | Performed by: INTERNAL MEDICINE

## 2025-07-10 PROCEDURE — 1101F PT FALLS ASSESS-DOCD LE1/YR: CPT | Mod: CPTII,S$GLB,, | Performed by: INTERNAL MEDICINE

## 2025-07-10 PROCEDURE — 99214 OFFICE O/P EST MOD 30 MIN: CPT | Mod: S$GLB,,, | Performed by: INTERNAL MEDICINE
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
## 2025-07-22 NOTE — PROGRESS NOTES
"Subjective:       Patient ID: Dalia Peña is a 76 y.o. female.    Vitals:  height is 5' 6" (1.676 m) and weight is 77.1 kg (170 lb). Her temperature is 97 °F (36.1 °C). Her blood pressure is 139/69 and her pulse is 65. Her respiration is 18 and oxygen saturation is 97%.     Chief Complaint: Nasal Congestion    Pt present for runny nose, itchy eyes, and ear pain that started a month ago and has gotten worse this past week. Pt denies exposure to any illness. Pt states she takes flonase and mucinex and it provided some relief.     Provider note begins below:    Patient with approximately 4 weeks of intermittent rhinorrhea with congestion, itchy watery eyes and ear pain/itching (right side).  Patient used Flonase for proximally 2 weeks with resolution of symptoms and then once she stops using Flonase the symptoms returned.  Patient has been using loratadine I am continuously throughout.  Patient has significant history of allergies.    No fever or chills.  No body aches or fatigue.  No known sick contacts.  Mild dry cough without shortness of breath, mostly throat clearing.        Cough  This is a new problem. The current episode started today. The problem has been gradually worsening. The problem occurs constantly. The cough is non-productive. Associated symptoms include chest pain, chills, ear congestion, ear pain and a sore throat. Pertinent negatives include no fever, headaches, heartburn, hemoptysis, myalgias, nasal congestion, postnasal drip, rash, rhinorrhea, shortness of breath, sweats, weight loss or wheezing. Nothing aggravates the symptoms. Treatments tried: mucinex, loratadine, flonase. The treatment provided mild relief. Her past medical history is significant for environmental allergies.       Constitution: Positive for chills. Negative for fever.   HENT: Positive for ear pain and sore throat. Negative for postnasal drip.    Cardiovascular: Positive for chest pain.   Respiratory: Positive for cough. " Has appt with burn clinic next week.      Negative for bloody sputum, shortness of breath and wheezing.    Gastrointestinal: Negative for heartburn.   Musculoskeletal: Negative for muscle ache.   Skin: Negative for rash.   Allergic/Immunologic: Positive for environmental allergies.   Neurological: Negative for headaches.       Objective:      Physical Exam   Constitutional: She is oriented to person, place, and time. She appears well-developed. She is cooperative.  Non-toxic appearance. She does not appear ill. No distress.   HENT:   Head: Normocephalic and atraumatic.   Ears:   Right Ear: Hearing, tympanic membrane, external ear and ear canal normal.   Left Ear: Hearing, tympanic membrane, external ear and ear canal normal.   Nose: Rhinorrhea present. No mucosal edema or nasal deformity. No epistaxis. Right sinus exhibits no maxillary sinus tenderness and no frontal sinus tenderness. Left sinus exhibits no maxillary sinus tenderness and no frontal sinus tenderness.   Mouth/Throat: Uvula is midline, oropharynx is clear and moist and mucous membranes are normal. No trismus in the jaw. Normal dentition. No uvula swelling. Cobblestoning present. No oropharyngeal exudate, posterior oropharyngeal edema or posterior oropharyngeal erythema.   Eyes: Conjunctivae and lids are normal. No scleral icterus.   Neck: Trachea normal and phonation normal. Neck supple. No edema present. No erythema present. No neck rigidity present.   Cardiovascular: Normal rate, regular rhythm, normal heart sounds and normal pulses.   Pulmonary/Chest: Effort normal and breath sounds normal. No stridor. No respiratory distress. She has no decreased breath sounds. She has no wheezes. She has no rhonchi. She has no rales.   Abdominal: Normal appearance.   Musculoskeletal: Normal range of motion.         General: No deformity. Normal range of motion.   Neurological: She is alert and oriented to person, place, and time. She exhibits normal muscle tone. Coordination normal.   Skin: Skin is warm,  dry, intact, not diaphoretic and not pale.   Psychiatric: Her speech is normal and behavior is normal. Judgment and thought content normal.   Nursing note and vitals reviewed.        Assessment:       1. Seasonal allergic rhinitis, unspecified trigger          Plan:         Seasonal allergic rhinitis, unspecified trigger  -     ipratropium (ATROVENT) 21 mcg (0.03 %) nasal spray; 2 sprays by Nasal route 2 (two) times daily. for 7 days  Dispense: 30 mL; Refill: 0

## 2025-07-30 ENCOUNTER — OFFICE VISIT (OUTPATIENT)
Dept: PULMONOLOGY | Facility: CLINIC | Age: 80
End: 2025-07-30
Payer: MEDICARE

## 2025-07-30 VITALS
RESPIRATION RATE: 16 BRPM | WEIGHT: 180.31 LBS | HEIGHT: 66 IN | DIASTOLIC BLOOD PRESSURE: 60 MMHG | BODY MASS INDEX: 28.98 KG/M2 | OXYGEN SATURATION: 98 % | SYSTOLIC BLOOD PRESSURE: 130 MMHG | HEART RATE: 60 BPM

## 2025-07-30 DIAGNOSIS — G47.33 OSA ON CPAP: Primary | ICD-10-CM

## 2025-07-30 PROCEDURE — 3075F SYST BP GE 130 - 139MM HG: CPT | Mod: CPTII,S$GLB,, | Performed by: HOSPITALIST

## 2025-07-30 PROCEDURE — 1100F PTFALLS ASSESS-DOCD GE2>/YR: CPT | Mod: CPTII,S$GLB,, | Performed by: HOSPITALIST

## 2025-07-30 PROCEDURE — 99213 OFFICE O/P EST LOW 20 MIN: CPT | Mod: S$GLB,,, | Performed by: HOSPITALIST

## 2025-07-30 PROCEDURE — 3288F FALL RISK ASSESSMENT DOCD: CPT | Mod: CPTII,S$GLB,, | Performed by: HOSPITALIST

## 2025-07-30 PROCEDURE — 1125F AMNT PAIN NOTED PAIN PRSNT: CPT | Mod: CPTII,S$GLB,, | Performed by: HOSPITALIST

## 2025-07-30 PROCEDURE — 99999 PR PBB SHADOW E&M-EST. PATIENT-LVL IV: CPT | Mod: PBBFAC,,, | Performed by: HOSPITALIST

## 2025-07-30 PROCEDURE — 3078F DIAST BP <80 MM HG: CPT | Mod: CPTII,S$GLB,, | Performed by: HOSPITALIST

## 2025-07-30 PROCEDURE — 1157F ADVNC CARE PLAN IN RCRD: CPT | Mod: CPTII,S$GLB,, | Performed by: HOSPITALIST

## 2025-07-30 PROCEDURE — 1160F RVW MEDS BY RX/DR IN RCRD: CPT | Mod: CPTII,S$GLB,, | Performed by: HOSPITALIST

## 2025-07-30 PROCEDURE — 1159F MED LIST DOCD IN RCRD: CPT | Mod: CPTII,S$GLB,, | Performed by: HOSPITALIST

## 2025-07-30 RX ORDER — HYDROCHLOROTHIAZIDE 12.5 MG/1
12.5 TABLET ORAL
COMMUNITY

## 2025-07-30 NOTE — PROGRESS NOTES
Subjective:      Patient ID: Dalia Peña is a 79 y.o. female.    Chief Complaint: Sleep apnea    Interval Hx 7/30/25:    Ms. Peña presents today for follow up sleep apnea. She was last seen 1/2025- pt motivated to get back to using PAP.     Gap in usage- was in [place], has machine in [place] that she used. Last supplies in May. Benefits from use.        Interval Hx 1/30/25:     Mrs. Peña presents today for follow up sleep apnea. She was last seen 7/2024- pt compliant, but frustrated with use- discussed positional therapy when not able to use CPAP, information was provided for [facility].      Last used CPAP in November, was going through a lot at the end of her 's terminal illness. Is motivated to get back to using- recounts that when she was compliant experienced better quality sleep, didn't wake up as much to use the restroom. Needs new supplies, shipped today. Wants to go back to CPAP.     HPI 7/31/24:     78 year old female with history of HTN, HLD, JESUS who presents to Pulmonary clinic for initial cpap compliance review. She was last seen 2/2024 by Jessica Bajwa NP when HST was ordered which showed moderate sleep apnea. CPAP was ordered.      Mrs. 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 In regards to the mask itself, she reports no issues. The pressures for the CPAP were initially too high and range changed to 4-8cmH2O. In regards to her insomnia- has addressed sleep hygiene, she has used the Calm steven, does not drink caffeine, and has taken other measures and will still at times have difficulty falling asleep. She does enjoy taking a 45 minute nap on weekdays when her son is in school.     Pertinent Work Up:  2 night HST 5/2024- AHI 17, SpO2 cecilia 81%-  "positional with non-supine AHI 2    Review of Systems   Respiratory:  Negative for snoring and somnolence.      Objective:     Physical Exam   Constitutional: She is oriented to person, place, and time. She appears well-developed and well-nourished. She is not obese.   Cardiovascular: Normal rate and regular rhythm.   Pulmonary/Chest: Normal expansion, effort normal and breath sounds normal.   Neurological: She is alert and oriented to person, place, and time.   Skin: Skin is warm and dry.     Personal Diagnostic Review  As Above      7/10/2025     1:45 PM 6/12/2025    10:01 AM 3/14/2025     9:05 AM 1/30/2025     9:42 AM 1/9/2025     9:48 AM 12/9/2024     8:55 AM 9/26/2024     1:49 PM   Pulmonary Function Tests   SpO2 95 % 95 % 96 % 97 % 97 % 96 % 96 %   Height 5' 6" (1.676 m) 5' 6" (1.676 m) 5' 6" (1.676 m) 5' 6" (1.676 m)  5' 6" (1.676 m) 5' 6" (1.676 m)   Weight 81.5 kg (179 lb 10.8 oz) 82.2 kg (181 lb 1.7 oz) 80 kg (176 lb 7.7 oz) 78.3 kg (172 lb 9.9 oz) 79.3 kg (174 lb 13.2 oz) 78.3 kg (172 lb 8.2 oz) 80.1 kg (176 lb 9.4 oz)   BMI (Calculated) 29 29.2 28.5 27.9  27.9 28.5        Assessment:     No diagnosis found.     Encounter Medications[1]  No orders of the defined types were placed in this encounter.    Plan:     Problem List Items Addressed This Visit       JESUS on CPAP - Primary    - overall compliant, benefits from use, motivated to increase use  - supplies ordered         Relevant Orders    CPAP/BIPAP SUPPLIES     Follow up in about 6 months or sooner as needed.          [1]   Outpatient Encounter Medications as of 7/30/2025   Medication Sig Dispense Refill    amLODIPine (NORVASC) 5 MG tablet TAKE ONE TABLET BY MOUTH EVERY DAY 90 tablet 3    busPIRone (BUSPAR) 5 MG Tab Take 1 tablet (5 mg total) by mouth 2 (two) times daily. 60 tablet 1    magnesium oxide (MAG-OX) 400 mg (241.3 mg magnesium) tablet Take 400 mg by mouth once daily.      metoprolol succinate (TOPROL-XL) 25 MG 24 hr tablet TAKE ONE TABLET " BY MOUTH EVERY DAY 90 tablet 3    omeprazole (PRILOSEC) 20 MG capsule Take 20 mg by mouth once daily.      sertraline (ZOLOFT) 50 MG tablet Take 1 tablet (50 mg total) by mouth once daily. 30 tablet 11    traZODone (DESYREL) 50 MG tablet Take 1 tablet (50 mg total) by mouth every evening. 30 tablet 11    vitamin D (VITAMIN D3) 1000 units Tab Take 1,000 Units by mouth once daily.       No facility-administered encounter medications on file as of 7/30/2025.

## 2025-08-08 DIAGNOSIS — F32.1 CURRENT MODERATE EPISODE OF MAJOR DEPRESSIVE DISORDER WITHOUT PRIOR EPISODE: ICD-10-CM

## 2025-08-08 RX ORDER — SERTRALINE HYDROCHLORIDE 50 MG/1
50 TABLET, FILM COATED ORAL
Qty: 90 TABLET | Refills: 3 | Status: SHIPPED | OUTPATIENT
Start: 2025-08-08

## 2025-08-21 ENCOUNTER — OFFICE VISIT (OUTPATIENT)
Dept: PRIMARY CARE CLINIC | Facility: CLINIC | Age: 80
End: 2025-08-21
Payer: MEDICARE

## 2025-08-21 DIAGNOSIS — F43.21 GRIEF: Primary | ICD-10-CM

## 2025-08-21 DIAGNOSIS — Z63.6 CAREGIVER STRESS: ICD-10-CM

## 2025-08-21 SDOH — SOCIAL DETERMINANTS OF HEALTH (SDOH): DEPENDENT RELATIVE NEEDING CARE AT HOME: Z63.6

## 2025-09-04 ENCOUNTER — OFFICE VISIT (OUTPATIENT)
Dept: PRIMARY CARE CLINIC | Facility: CLINIC | Age: 80
End: 2025-09-04
Payer: MEDICARE

## 2025-09-04 VITALS
HEART RATE: 55 BPM | BODY MASS INDEX: 28.67 KG/M2 | DIASTOLIC BLOOD PRESSURE: 56 MMHG | TEMPERATURE: 97 F | HEIGHT: 66 IN | SYSTOLIC BLOOD PRESSURE: 138 MMHG | WEIGHT: 178.38 LBS | OXYGEN SATURATION: 96 %

## 2025-09-04 DIAGNOSIS — F32.1 CURRENT MODERATE EPISODE OF MAJOR DEPRESSIVE DISORDER WITHOUT PRIOR EPISODE: ICD-10-CM

## 2025-09-04 DIAGNOSIS — Z63.6 CAREGIVER STRESS: ICD-10-CM

## 2025-09-04 DIAGNOSIS — G47.33 OSA ON CPAP: ICD-10-CM

## 2025-09-04 DIAGNOSIS — E78.49 OTHER HYPERLIPIDEMIA: ICD-10-CM

## 2025-09-04 DIAGNOSIS — F41.8 SITUATIONAL ANXIETY: ICD-10-CM

## 2025-09-04 DIAGNOSIS — M85.80 OSTEOPENIA WITH HIGH RISK OF FRACTURE: ICD-10-CM

## 2025-09-04 DIAGNOSIS — F51.01 PRIMARY INSOMNIA: ICD-10-CM

## 2025-09-04 DIAGNOSIS — I10 ESSENTIAL HYPERTENSION: Primary | ICD-10-CM

## 2025-09-04 PROCEDURE — 1126F AMNT PAIN NOTED NONE PRSNT: CPT | Mod: CPTII,S$GLB,, | Performed by: INTERNAL MEDICINE

## 2025-09-04 PROCEDURE — 99999 PR PBB SHADOW E&M-EST. PATIENT-LVL III: CPT | Mod: PBBFAC,,, | Performed by: INTERNAL MEDICINE

## 2025-09-04 PROCEDURE — 3288F FALL RISK ASSESSMENT DOCD: CPT | Mod: CPTII,S$GLB,, | Performed by: INTERNAL MEDICINE

## 2025-09-04 PROCEDURE — 1101F PT FALLS ASSESS-DOCD LE1/YR: CPT | Mod: CPTII,S$GLB,, | Performed by: INTERNAL MEDICINE

## 2025-09-04 PROCEDURE — 99214 OFFICE O/P EST MOD 30 MIN: CPT | Mod: S$GLB,,, | Performed by: INTERNAL MEDICINE

## 2025-09-04 PROCEDURE — 1159F MED LIST DOCD IN RCRD: CPT | Mod: CPTII,S$GLB,, | Performed by: INTERNAL MEDICINE

## 2025-09-04 PROCEDURE — 3078F DIAST BP <80 MM HG: CPT | Mod: CPTII,S$GLB,, | Performed by: INTERNAL MEDICINE

## 2025-09-04 PROCEDURE — 1157F ADVNC CARE PLAN IN RCRD: CPT | Mod: CPTII,S$GLB,, | Performed by: INTERNAL MEDICINE

## 2025-09-04 PROCEDURE — 3075F SYST BP GE 130 - 139MM HG: CPT | Mod: CPTII,S$GLB,, | Performed by: INTERNAL MEDICINE

## 2025-09-04 PROCEDURE — G2211 COMPLEX E/M VISIT ADD ON: HCPCS | Mod: S$GLB,,, | Performed by: INTERNAL MEDICINE

## 2025-09-04 RX ORDER — EZETIMIBE 10 MG/1
10 TABLET ORAL DAILY
Qty: 90 TABLET | Refills: 3 | Status: SHIPPED | OUTPATIENT
Start: 2025-09-04 | End: 2026-09-04

## 2025-09-04 SDOH — SOCIAL DETERMINANTS OF HEALTH (SDOH): DEPENDENT RELATIVE NEEDING CARE AT HOME: Z63.6
